# Patient Record
Sex: FEMALE | Race: WHITE | NOT HISPANIC OR LATINO | URBAN - METROPOLITAN AREA
[De-identification: names, ages, dates, MRNs, and addresses within clinical notes are randomized per-mention and may not be internally consistent; named-entity substitution may affect disease eponyms.]

---

## 2017-04-12 ENCOUNTER — INPATIENT (INPATIENT)
Facility: HOSPITAL | Age: 62
LOS: 5 days | Discharge: HOME | End: 2017-04-18
Attending: HOSPITALIST | Admitting: INTERNAL MEDICINE
Payer: MEDICARE

## 2017-04-12 VITALS — WEIGHT: 175.05 LBS | HEIGHT: 65 IN

## 2017-04-12 DIAGNOSIS — D72.829 ELEVATED WHITE BLOOD CELL COUNT, UNSPECIFIED: ICD-10-CM

## 2017-04-12 DIAGNOSIS — J18.9 PNEUMONIA, UNSPECIFIED ORGANISM: ICD-10-CM

## 2017-04-12 DIAGNOSIS — J96.91 RESPIRATORY FAILURE, UNSPECIFIED WITH HYPOXIA: ICD-10-CM

## 2017-04-12 DIAGNOSIS — M54.9 DORSALGIA, UNSPECIFIED: ICD-10-CM

## 2017-04-12 DIAGNOSIS — J18.1 LOBAR PNEUMONIA, UNSPECIFIED ORGANISM: ICD-10-CM

## 2017-04-12 LAB
ALBUMIN SERPL ELPH-MCNC: 3.1 G/DL — LOW (ref 3.3–5)
ALP SERPL-CCNC: 83 U/L — SIGNIFICANT CHANGE UP (ref 40–120)
ALT FLD-CCNC: 46 U/L — SIGNIFICANT CHANGE UP (ref 12–78)
ANION GAP SERPL CALC-SCNC: 12 MMOL/L — SIGNIFICANT CHANGE UP (ref 5–17)
APTT BLD: 32 SEC — SIGNIFICANT CHANGE UP (ref 27.5–37.4)
AST SERPL-CCNC: 57 U/L — HIGH (ref 15–37)
BASOPHILS # BLD AUTO: 0.2 K/UL — SIGNIFICANT CHANGE UP (ref 0–0.2)
BASOPHILS NFR BLD AUTO: 0.6 % — SIGNIFICANT CHANGE UP (ref 0–2)
BILIRUB SERPL-MCNC: 0.5 MG/DL — SIGNIFICANT CHANGE UP (ref 0.2–1.2)
BUN SERPL-MCNC: 10 MG/DL — SIGNIFICANT CHANGE UP (ref 7–23)
CALCIUM SERPL-MCNC: 9.5 MG/DL — SIGNIFICANT CHANGE UP (ref 8.5–10.1)
CHLORIDE SERPL-SCNC: 100 MMOL/L — SIGNIFICANT CHANGE UP (ref 96–108)
CK SERPL-CCNC: 205 U/L — HIGH (ref 26–192)
CO2 SERPL-SCNC: 24 MMOL/L — SIGNIFICANT CHANGE UP (ref 22–31)
CREAT SERPL-MCNC: 0.76 MG/DL — SIGNIFICANT CHANGE UP (ref 0.5–1.3)
EOSINOPHIL # BLD AUTO: 0.1 K/UL — SIGNIFICANT CHANGE UP (ref 0–0.5)
EOSINOPHIL NFR BLD AUTO: 0.3 % — SIGNIFICANT CHANGE UP (ref 0–6)
GLUCOSE SERPL-MCNC: 137 MG/DL — HIGH (ref 70–99)
HCT VFR BLD CALC: 38 % — SIGNIFICANT CHANGE UP (ref 34.5–45)
HGB BLD-MCNC: 13.3 G/DL — SIGNIFICANT CHANGE UP (ref 11.5–15.5)
INR BLD: 1.3 RATIO — HIGH (ref 0.88–1.16)
LYMPHOCYTES # BLD AUTO: 1.5 K/UL — SIGNIFICANT CHANGE UP (ref 1–3.3)
LYMPHOCYTES # BLD AUTO: 5.7 % — LOW (ref 13–44)
MACROCYTES BLD QL: SLIGHT — SIGNIFICANT CHANGE UP
MANUAL DIF COMMENT BLD-IMP: SIGNIFICANT CHANGE UP
MCHC RBC-ENTMCNC: 31.6 PG — SIGNIFICANT CHANGE UP (ref 27–34)
MCHC RBC-ENTMCNC: 35.1 GM/DL — SIGNIFICANT CHANGE UP (ref 32–36)
MCV RBC AUTO: 90.2 FL — SIGNIFICANT CHANGE UP (ref 80–100)
MONOCYTES # BLD AUTO: 1.3 K/UL — HIGH (ref 0–0.9)
MONOCYTES NFR BLD AUTO: 4.8 % — SIGNIFICANT CHANGE UP (ref 2–14)
NEUTROPHILS # BLD AUTO: 23.6 K/UL — HIGH (ref 1.8–7.4)
NEUTROPHILS NFR BLD AUTO: 88.6 % — HIGH (ref 43–77)
NT-PROBNP SERPL-SCNC: 558 PG/ML — HIGH (ref 0–125)
PLAT MORPH BLD: NORMAL — SIGNIFICANT CHANGE UP
PLATELET # BLD AUTO: 368 K/UL — SIGNIFICANT CHANGE UP (ref 150–400)
POTASSIUM SERPL-MCNC: 4.3 MMOL/L — SIGNIFICANT CHANGE UP (ref 3.5–5.3)
POTASSIUM SERPL-SCNC: 4.3 MMOL/L — SIGNIFICANT CHANGE UP (ref 3.5–5.3)
PROT SERPL-MCNC: 7.9 GM/DL — SIGNIFICANT CHANGE UP (ref 6–8.3)
PROTHROM AB SERPL-ACNC: 14.1 SEC — HIGH (ref 9.8–12.7)
RAPID RVP RESULT: SIGNIFICANT CHANGE UP
RBC # BLD: 4.21 M/UL — SIGNIFICANT CHANGE UP (ref 3.8–5.2)
RBC # FLD: 11.8 % — SIGNIFICANT CHANGE UP (ref 10.3–14.5)
RBC BLD AUTO: NORMAL — SIGNIFICANT CHANGE UP
SODIUM SERPL-SCNC: 136 MMOL/L — SIGNIFICANT CHANGE UP (ref 135–145)
TROPONIN I SERPL-MCNC: 0.02 NG/ML — SIGNIFICANT CHANGE UP (ref 0.01–0.04)
WBC # BLD: 26.6 K/UL — HIGH (ref 3.8–10.5)
WBC # FLD AUTO: 26.6 K/UL — HIGH (ref 3.8–10.5)

## 2017-04-12 PROCEDURE — 71250 CT THORAX DX C-: CPT | Mod: 26

## 2017-04-12 PROCEDURE — 99285 EMERGENCY DEPT VISIT HI MDM: CPT

## 2017-04-12 PROCEDURE — 93010 ELECTROCARDIOGRAM REPORT: CPT

## 2017-04-12 PROCEDURE — 74176 CT ABD & PELVIS W/O CONTRAST: CPT | Mod: 26

## 2017-04-12 RX ORDER — HYDROMORPHONE HYDROCHLORIDE 2 MG/ML
1 INJECTION INTRAMUSCULAR; INTRAVENOUS; SUBCUTANEOUS ONCE
Qty: 0 | Refills: 0 | Status: DISCONTINUED | OUTPATIENT
Start: 2017-04-12 | End: 2017-04-12

## 2017-04-12 RX ORDER — VANCOMYCIN HCL 1 G
1000 VIAL (EA) INTRAVENOUS ONCE
Qty: 0 | Refills: 0 | Status: COMPLETED | OUTPATIENT
Start: 2017-04-12 | End: 2017-04-12

## 2017-04-12 RX ORDER — IPRATROPIUM/ALBUTEROL SULFATE 18-103MCG
3 AEROSOL WITH ADAPTER (GRAM) INHALATION EVERY 6 HOURS
Qty: 0 | Refills: 0 | Status: DISCONTINUED | OUTPATIENT
Start: 2017-04-12 | End: 2017-04-18

## 2017-04-12 RX ORDER — HYDROMORPHONE HYDROCHLORIDE 2 MG/ML
1 INJECTION INTRAMUSCULAR; INTRAVENOUS; SUBCUTANEOUS EVERY 4 HOURS
Qty: 0 | Refills: 0 | Status: DISCONTINUED | OUTPATIENT
Start: 2017-04-12 | End: 2017-04-13

## 2017-04-12 RX ORDER — ONDANSETRON 8 MG/1
4 TABLET, FILM COATED ORAL EVERY 6 HOURS
Qty: 0 | Refills: 0 | Status: DISCONTINUED | OUTPATIENT
Start: 2017-04-12 | End: 2017-04-18

## 2017-04-12 RX ORDER — ACETAMINOPHEN 500 MG
650 TABLET ORAL EVERY 6 HOURS
Qty: 0 | Refills: 0 | Status: DISCONTINUED | OUTPATIENT
Start: 2017-04-12 | End: 2017-04-18

## 2017-04-12 RX ORDER — AZTREONAM 2 G
1000 VIAL (EA) INJECTION ONCE
Qty: 0 | Refills: 0 | Status: COMPLETED | OUTPATIENT
Start: 2017-04-12 | End: 2017-04-12

## 2017-04-12 RX ORDER — VANCOMYCIN HCL 1 G
VIAL (EA) INTRAVENOUS
Qty: 0 | Refills: 0 | Status: DISCONTINUED | OUTPATIENT
Start: 2017-04-12 | End: 2017-04-13

## 2017-04-12 RX ORDER — SODIUM CHLORIDE 9 MG/ML
750 INJECTION INTRAMUSCULAR; INTRAVENOUS; SUBCUTANEOUS ONCE
Qty: 0 | Refills: 0 | Status: COMPLETED | OUTPATIENT
Start: 2017-04-12 | End: 2017-04-12

## 2017-04-12 RX ORDER — AZTREONAM 2 G
1000 VIAL (EA) INJECTION EVERY 12 HOURS
Qty: 0 | Refills: 0 | Status: DISCONTINUED | OUTPATIENT
Start: 2017-04-12 | End: 2017-04-13

## 2017-04-12 RX ORDER — ONDANSETRON 8 MG/1
4 TABLET, FILM COATED ORAL ONCE
Qty: 0 | Refills: 0 | Status: COMPLETED | OUTPATIENT
Start: 2017-04-12 | End: 2017-04-12

## 2017-04-12 RX ORDER — ZOLPIDEM TARTRATE 10 MG/1
5 TABLET ORAL AT BEDTIME
Qty: 0 | Refills: 0 | Status: DISCONTINUED | OUTPATIENT
Start: 2017-04-12 | End: 2017-04-18

## 2017-04-12 RX ORDER — VANCOMYCIN HCL 1 G
1000 VIAL (EA) INTRAVENOUS EVERY 24 HOURS
Qty: 0 | Refills: 0 | Status: DISCONTINUED | OUTPATIENT
Start: 2017-04-13 | End: 2017-04-13

## 2017-04-12 RX ORDER — AZTREONAM 2 G
1000 VIAL (EA) INJECTION EVERY 12 HOURS
Qty: 0 | Refills: 0 | Status: DISCONTINUED | OUTPATIENT
Start: 2017-04-13 | End: 2017-04-13

## 2017-04-12 RX ORDER — ALBUTEROL 90 UG/1
2.5 AEROSOL, METERED ORAL EVERY 4 HOURS
Qty: 0 | Refills: 0 | Status: DISCONTINUED | OUTPATIENT
Start: 2017-04-12 | End: 2017-04-18

## 2017-04-12 RX ORDER — AZTREONAM 2 G
VIAL (EA) INJECTION
Qty: 0 | Refills: 0 | Status: DISCONTINUED | OUTPATIENT
Start: 2017-04-12 | End: 2017-04-13

## 2017-04-12 RX ADMIN — HYDROMORPHONE HYDROCHLORIDE 1 MILLIGRAM(S): 2 INJECTION INTRAMUSCULAR; INTRAVENOUS; SUBCUTANEOUS at 21:30

## 2017-04-12 RX ADMIN — HYDROMORPHONE HYDROCHLORIDE 1 MILLIGRAM(S): 2 INJECTION INTRAMUSCULAR; INTRAVENOUS; SUBCUTANEOUS at 17:02

## 2017-04-12 RX ADMIN — Medication 30 MILLILITER(S): at 23:37

## 2017-04-12 RX ADMIN — Medication 250 MILLIGRAM(S): at 19:00

## 2017-04-12 RX ADMIN — HYDROMORPHONE HYDROCHLORIDE 1 MILLIGRAM(S): 2 INJECTION INTRAMUSCULAR; INTRAVENOUS; SUBCUTANEOUS at 16:20

## 2017-04-12 RX ADMIN — ONDANSETRON 4 MILLIGRAM(S): 8 TABLET, FILM COATED ORAL at 16:20

## 2017-04-12 RX ADMIN — SODIUM CHLORIDE 1500 MILLILITER(S): 9 INJECTION INTRAMUSCULAR; INTRAVENOUS; SUBCUTANEOUS at 15:25

## 2017-04-12 RX ADMIN — HYDROMORPHONE HYDROCHLORIDE 1 MILLIGRAM(S): 2 INJECTION INTRAMUSCULAR; INTRAVENOUS; SUBCUTANEOUS at 17:32

## 2017-04-12 RX ADMIN — HYDROMORPHONE HYDROCHLORIDE 1 MILLIGRAM(S): 2 INJECTION INTRAMUSCULAR; INTRAVENOUS; SUBCUTANEOUS at 16:50

## 2017-04-12 RX ADMIN — Medication 50 MILLIGRAM(S): at 16:48

## 2017-04-12 NOTE — ED PROVIDER NOTE - NS ED MD SCRIBE ATTENDING SCRIBE SECTIONS
PROGRESS NOTE/RESULTS/HISTORY OF PRESENT ILLNESS/PAST MEDICAL/SURGICAL/SOCIAL HISTORY/REVIEW OF SYSTEMS/PHYSICAL EXAM/DISPOSITION

## 2017-04-12 NOTE — H&P ADULT - ASSESSMENT
Pt is a 61 year female with a PMHx notable for kidney stone right side who presents to the ED with complaints of Rt sided flank pain, fever for the past few days.  Pt states that she was advised by Dr. Cool to come into the ED for further evaluation and treatment due to symptoms and low O2 saturation.  Pt state that she is experiencing sharp pain to her right side, back pain, cough, mylagias that worsened this AM.    ED course:  O2 saturation 88%.  CT abd/pelvis: Large consolidation with near complete collapse of the right middle and lower lobes.  There is partial atelectasis of the right upper lobe.  There is a small to moderate Rt pl effusion extending along the lateral Rt hemithorax.  Pt was given vancomcyin, aztreonam, and NS IV fluid bolus. Pt is a 61 year female with a PMHx notable for kidney stone, and chronic back pain.  She presents to the ED with complaints of Rt sided flank pain, fever for the past few days.  Pt states that she was advised by Dr. Cool to come into the ED for further evaluation and treatment due to symptoms and low O2 saturation.  Pt state that she is experiencing sharp pain to her right side, back pain, cough, mylagias that worsened this AM.    ED course:  O2 saturation 88%.  CT abd/pelvis: Large consolidation with near complete collapse of the right middle and lower lobes.  There is partial atelectasis of the right upper lobe.  There is a small to moderate Rt pl effusion extending along the lateral Rt hemithorax.  Pt was given vancomcyin, aztreonam, and NS IV fluid bolus.

## 2017-04-12 NOTE — H&P ADULT - PROBLEM SELECTOR PLAN 3
Likely related to lobar pneumonia.  Closely monitor and if clincially no improvement might need to tap

## 2017-04-12 NOTE — H&P ADULT - NSHPPHYSICALEXAM_GEN_ALL_CORE
Physical Exam:   GENERAL APPEARANCE:  NAD, hemodynamically stable  T(C): 36.6, Max: 38.3 (04-12 @ 16:23)  HR: 91 (91 - 114)  BP: 106/64 (95/74 - 153/82)  RR: 19 (18 - 19)  SpO2: 94% (89% - 98%)

## 2017-04-12 NOTE — H&P ADULT - HISTORY OF PRESENT ILLNESS
Pt is a 61 year female with a PMHx notable for kidney stone right side who presents to the ED with complaints of Rt sided flank pain, fever for the past few days.  Pt states that she was advised by Dr. Cool to come into the ED for further evaluation and treatment due to symptoms and low O2 saturation.  Pt state that she is experiencing sharp pain to her right side, back pain, cough, mylagias that worsened this AM.    ED course:  O2 saturation 88%.  CT abd/pelvis: Large consolidation with near complete collapse of the right middle and lower lobes.  There is partial atelectasis of the right upper lobe.  There is a small to moderate Rt pl effusion extending along the lateral Rt hemithorax.  Pt was given vancomcyin, aztreonam, and NS IV fluid bolus. Pt is a 61 year female with a PMHx notable for kidney stone right side, and chronic pain.  She presents to the ED with complaints of Rt sided flank pain, fever for the past few days.  Pt states that she was advised by Dr. Cool to come into the ED for further evaluation and treatment due to symptoms and low O2 saturation.  Pt state that she is experiencing sharp pain to her right side, back pain, cough, mylagias that worsened this AM.    ED course:  O2 saturation 88%.  CT abd/pelvis: Large consolidation with near complete collapse of the right middle and lower lobes.  There is partial atelectasis of the right upper lobe.  There is a small to moderate Rt pl effusion extending along the lateral Rt hemithorax.  Pt was given vancomcyin, aztreonam, and NS IV fluid bolus.

## 2017-04-12 NOTE — ED PROVIDER NOTE - OBJECTIVE STATEMENT
60 y/o F with a PMHx of kidney stone right side presents to the ED c/o right sided flank pain, fever for the past few days. Pt states that she was advised by Travon to come into the ED for further eval and tx due to symptoms and low O2 sat. Pt states that she is experiencing sharp pain to her right side, back pain, cough, myalgias that worsened this am.

## 2017-04-12 NOTE — H&P ADULT - PROBLEM SELECTOR PLAN 2
Blood culture's x 2 already collected in ED  Send sputum gram stain/culture  Check urine legionella antigen  s/p IV vancomcyin / aztreonam in ED  Continuue with aztreonam and consult ID

## 2017-04-12 NOTE — PATIENT PROFILE ADULT. - PSH
No significant past surgical history Bilateral carpal tunnel syndrome    History of colon resection  Colon Resection - 2005  History of left knee surgery  Left Knee surgery -2013

## 2017-04-12 NOTE — ED PROVIDER NOTE - MEDICAL DECISION MAKING DETAILS
Pt currently calm, presenting mild discomfort with plans to receive imaging, IV fluids, labs for further eval and tx.

## 2017-04-12 NOTE — ED STATDOCS - PROGRESS NOTE DETAILS
Cynthia Loaiza: 62 y/o F presents to the ED c/o right mid back pain associated with SOB. THe pt currently has a low o2 sat, will be further evaluated in Main ED. I, Sravani Reilly, performed the initial face to face bedside interview with this patient regarding history of present illness and determined that the patient should be seen in the main ED.  The history, was documented by the scribe in my presence and I attest to the accuracy of the documentation.

## 2017-04-12 NOTE — ED ADULT NURSE REASSESSMENT NOTE - NS ED NURSE REASSESS COMMENT FT1
Pt repositioned for comfort. Pt afebrile at this time but states " I'm hot." Pt given ice packs for relief and started on second IV antibiotic. Will continue to monitor for safety and comfort.

## 2017-04-12 NOTE — ED ADULT NURSE REASSESSMENT NOTE - NS ED NURSE REASSESS COMMENT FT1
Pt medicated with 1 mg of Dilaudid as verbally ordered by Dr. Leo. Will continue to monitor for safety and comfort.

## 2017-04-12 NOTE — ED PROVIDER NOTE - CONSTITUTIONAL, MLM
normal... Ill appearing, elderly female, awake, alert, oriented to person, place, time/situation presenting mild discomfort.

## 2017-04-12 NOTE — H&P ADULT - PROBLEM SELECTOR PLAN 5
Pt normally on multiple pain meds at home; but request's that we not order them in the hospital; because her back injury is work related and she is anxious that PCP will not prescribe meds;  if they are given in hospital acute care setting.  Is instead requesting IV dilaudid.

## 2017-04-12 NOTE — H&P ADULT - NSHPLABSRESULTS_GEN_ALL_CORE
13.3   26.6  )-----------( 368      ( 12 Apr 2017 15:35 )             38.0     04-12    136  |  100  |  10  ----------------------------<  137<H>  4.3   |  24  |  0.76    Ca    9.5      12 Apr 2017 15:35    T Protein  7.9  /  Albumin  3.1<L>  /  Total Bilirubin  0.5  /  Direct Bili  x   /  AST  57<H>  /  ALT  46  /  AlkPhos  83  04-12    CARDIAC MARKERS ( 12 Apr 2017 15:35 )  0.019 ng/mL / x     / 205 U/L / x     / x              PT/INR - ( 12 Apr 2017 15:35 )   PT: 14.1 sec;   INR: 1.30 ratio         PTT - ( 12 Apr 2017 15:35 )  PTT:32.0 sec

## 2017-04-12 NOTE — ED STATDOCS - DETAILS:
I, Sravani Reilly, performed the initial face to face bedside interview with this patient regarding history of present illness and determined that the patient should be seen in the main ED.  The history, was documented by the scribe in my presence and I attest to the accuracy of the documentation.

## 2017-04-12 NOTE — ED ADULT NURSE NOTE - OBJECTIVE STATEMENT
60 y/o c/o right side flank pain x 2 days, worse today. Denies blood in urine, denies urgency and frequency.

## 2017-04-13 DIAGNOSIS — G56.03 CARPAL TUNNEL SYNDROME, BILATERAL UPPER LIMBS: Chronic | ICD-10-CM

## 2017-04-13 DIAGNOSIS — Z90.49 ACQUIRED ABSENCE OF OTHER SPECIFIED PARTS OF DIGESTIVE TRACT: Chronic | ICD-10-CM

## 2017-04-13 DIAGNOSIS — Z98.890 OTHER SPECIFIED POSTPROCEDURAL STATES: Chronic | ICD-10-CM

## 2017-04-13 LAB
ANION GAP SERPL CALC-SCNC: 14 MMOL/L — SIGNIFICANT CHANGE UP (ref 5–17)
BASOPHILS # BLD AUTO: 0.1 K/UL — SIGNIFICANT CHANGE UP (ref 0–0.2)
BASOPHILS NFR BLD AUTO: 0.5 % — SIGNIFICANT CHANGE UP (ref 0–2)
BUN SERPL-MCNC: 6 MG/DL — LOW (ref 7–23)
CALCIUM SERPL-MCNC: 9.1 MG/DL — SIGNIFICANT CHANGE UP (ref 8.5–10.1)
CHLORIDE SERPL-SCNC: 102 MMOL/L — SIGNIFICANT CHANGE UP (ref 96–108)
CO2 SERPL-SCNC: 23 MMOL/L — SIGNIFICANT CHANGE UP (ref 22–31)
CREAT SERPL-MCNC: 0.56 MG/DL — SIGNIFICANT CHANGE UP (ref 0.5–1.3)
EOSINOPHIL # BLD AUTO: 0.1 K/UL — SIGNIFICANT CHANGE UP (ref 0–0.5)
EOSINOPHIL NFR BLD AUTO: 0.3 % — SIGNIFICANT CHANGE UP (ref 0–6)
GLUCOSE SERPL-MCNC: 129 MG/DL — HIGH (ref 70–99)
HCT VFR BLD CALC: 37.1 % — SIGNIFICANT CHANGE UP (ref 34.5–45)
HGB BLD-MCNC: 12.6 G/DL — SIGNIFICANT CHANGE UP (ref 11.5–15.5)
LEGIONELLA AG UR QL: NEGATIVE — SIGNIFICANT CHANGE UP
LYMPHOCYTES # BLD AUTO: 1.6 K/UL — SIGNIFICANT CHANGE UP (ref 1–3.3)
LYMPHOCYTES # BLD AUTO: 6.6 % — LOW (ref 13–44)
MCHC RBC-ENTMCNC: 30.8 PG — SIGNIFICANT CHANGE UP (ref 27–34)
MCHC RBC-ENTMCNC: 33.8 GM/DL — SIGNIFICANT CHANGE UP (ref 32–36)
MCV RBC AUTO: 91.1 FL — SIGNIFICANT CHANGE UP (ref 80–100)
MONOCYTES # BLD AUTO: 1.1 K/UL — HIGH (ref 0–0.9)
MONOCYTES NFR BLD AUTO: 4.5 % — SIGNIFICANT CHANGE UP (ref 2–14)
NEUTROPHILS # BLD AUTO: 21 K/UL — HIGH (ref 1.8–7.4)
NEUTROPHILS NFR BLD AUTO: 88.1 % — HIGH (ref 43–77)
PLATELET # BLD AUTO: 342 K/UL — SIGNIFICANT CHANGE UP (ref 150–400)
POTASSIUM SERPL-MCNC: 3.7 MMOL/L — SIGNIFICANT CHANGE UP (ref 3.5–5.3)
POTASSIUM SERPL-SCNC: 3.7 MMOL/L — SIGNIFICANT CHANGE UP (ref 3.5–5.3)
RBC # BLD: 4.08 M/UL — SIGNIFICANT CHANGE UP (ref 3.8–5.2)
RBC # FLD: 11.7 % — SIGNIFICANT CHANGE UP (ref 10.3–14.5)
SODIUM SERPL-SCNC: 139 MMOL/L — SIGNIFICANT CHANGE UP (ref 135–145)
WBC # BLD: 23.9 K/UL — HIGH (ref 3.8–10.5)
WBC # FLD AUTO: 23.9 K/UL — HIGH (ref 3.8–10.5)

## 2017-04-13 RX ORDER — CEFTRIAXONE 500 MG/1
INJECTION, POWDER, FOR SOLUTION INTRAMUSCULAR; INTRAVENOUS
Qty: 0 | Refills: 0 | Status: DISCONTINUED | OUTPATIENT
Start: 2017-04-13 | End: 2017-04-18

## 2017-04-13 RX ORDER — ATENOLOL 25 MG/1
25 TABLET ORAL DAILY
Qty: 0 | Refills: 0 | Status: DISCONTINUED | OUTPATIENT
Start: 2017-04-13 | End: 2017-04-18

## 2017-04-13 RX ORDER — CEFTRIAXONE 500 MG/1
1 INJECTION, POWDER, FOR SOLUTION INTRAMUSCULAR; INTRAVENOUS ONCE
Qty: 0 | Refills: 0 | Status: COMPLETED | OUTPATIENT
Start: 2017-04-13 | End: 2017-04-13

## 2017-04-13 RX ORDER — GABAPENTIN 400 MG/1
300 CAPSULE ORAL AT BEDTIME
Qty: 0 | Refills: 0 | Status: DISCONTINUED | OUTPATIENT
Start: 2017-04-13 | End: 2017-04-18

## 2017-04-13 RX ORDER — ASPIRIN/CALCIUM CARB/MAGNESIUM 324 MG
81 TABLET ORAL DAILY
Qty: 0 | Refills: 0 | Status: DISCONTINUED | OUTPATIENT
Start: 2017-04-13 | End: 2017-04-13

## 2017-04-13 RX ORDER — CEFEPIME 1 G/1
1000 INJECTION, POWDER, FOR SOLUTION INTRAMUSCULAR; INTRAVENOUS EVERY 12 HOURS
Qty: 0 | Refills: 0 | Status: DISCONTINUED | OUTPATIENT
Start: 2017-04-13 | End: 2017-04-13

## 2017-04-13 RX ORDER — OXYCODONE HYDROCHLORIDE 5 MG/1
10 TABLET ORAL EVERY 12 HOURS
Qty: 0 | Refills: 0 | Status: DISCONTINUED | OUTPATIENT
Start: 2017-04-13 | End: 2017-04-13

## 2017-04-13 RX ORDER — MELOXICAM 15 MG/1
1 TABLET ORAL
Qty: 0 | Refills: 0 | COMMUNITY

## 2017-04-13 RX ORDER — LIDOCAINE 4 G/100G
1 CREAM TOPICAL DAILY
Qty: 0 | Refills: 0 | Status: DISCONTINUED | OUTPATIENT
Start: 2017-04-13 | End: 2017-04-18

## 2017-04-13 RX ORDER — OXYCODONE HYDROCHLORIDE 5 MG/1
1 TABLET ORAL
Qty: 0 | Refills: 0 | COMMUNITY

## 2017-04-13 RX ORDER — MORPHINE SULFATE 50 MG/1
30 CAPSULE, EXTENDED RELEASE ORAL EVERY 12 HOURS
Qty: 0 | Refills: 0 | Status: DISCONTINUED | OUTPATIENT
Start: 2017-04-13 | End: 2017-04-18

## 2017-04-13 RX ORDER — VANCOMYCIN HCL 1 G
750 VIAL (EA) INTRAVENOUS EVERY 12 HOURS
Qty: 0 | Refills: 0 | Status: DISCONTINUED | OUTPATIENT
Start: 2017-04-13 | End: 2017-04-18

## 2017-04-13 RX ORDER — ASPIRIN/CALCIUM CARB/MAGNESIUM 324 MG
81 TABLET ORAL DAILY
Qty: 0 | Refills: 0 | Status: DISCONTINUED | OUTPATIENT
Start: 2017-04-13 | End: 2017-04-18

## 2017-04-13 RX ORDER — OXYCODONE HYDROCHLORIDE 5 MG/1
30 TABLET ORAL EVERY 4 HOURS
Qty: 0 | Refills: 0 | Status: DISCONTINUED | OUTPATIENT
Start: 2017-04-13 | End: 2017-04-18

## 2017-04-13 RX ORDER — CEFTRIAXONE 500 MG/1
1 INJECTION, POWDER, FOR SOLUTION INTRAMUSCULAR; INTRAVENOUS EVERY 24 HOURS
Qty: 0 | Refills: 0 | Status: DISCONTINUED | OUTPATIENT
Start: 2017-04-14 | End: 2017-04-18

## 2017-04-13 RX ORDER — ENOXAPARIN SODIUM 100 MG/ML
40 INJECTION SUBCUTANEOUS EVERY 24 HOURS
Qty: 0 | Refills: 0 | Status: DISCONTINUED | OUTPATIENT
Start: 2017-04-13 | End: 2017-04-18

## 2017-04-13 RX ADMIN — LIDOCAINE 1 PATCH: 4 CREAM TOPICAL at 01:34

## 2017-04-13 RX ADMIN — Medication 50 MILLIGRAM(S): at 21:45

## 2017-04-13 RX ADMIN — OXYCODONE HYDROCHLORIDE 30 MILLIGRAM(S): 5 TABLET ORAL at 21:46

## 2017-04-13 RX ADMIN — OXYCODONE HYDROCHLORIDE 30 MILLIGRAM(S): 5 TABLET ORAL at 17:16

## 2017-04-13 RX ADMIN — Medication 50 MILLIGRAM(S): at 05:13

## 2017-04-13 RX ADMIN — GABAPENTIN 300 MILLIGRAM(S): 400 CAPSULE ORAL at 21:45

## 2017-04-13 RX ADMIN — MORPHINE SULFATE 30 MILLIGRAM(S): 50 CAPSULE, EXTENDED RELEASE ORAL at 13:32

## 2017-04-13 RX ADMIN — HYDROMORPHONE HYDROCHLORIDE 1 MILLIGRAM(S): 2 INJECTION INTRAMUSCULAR; INTRAVENOUS; SUBCUTANEOUS at 10:12

## 2017-04-13 RX ADMIN — Medication 81 MILLIGRAM(S): at 13:20

## 2017-04-13 RX ADMIN — HYDROMORPHONE HYDROCHLORIDE 1 MILLIGRAM(S): 2 INJECTION INTRAMUSCULAR; INTRAVENOUS; SUBCUTANEOUS at 01:42

## 2017-04-13 RX ADMIN — MORPHINE SULFATE 30 MILLIGRAM(S): 50 CAPSULE, EXTENDED RELEASE ORAL at 12:55

## 2017-04-13 RX ADMIN — Medication 3 MILLILITER(S): at 10:03

## 2017-04-13 RX ADMIN — Medication 150 MILLIGRAM(S): at 18:52

## 2017-04-13 RX ADMIN — LIDOCAINE 1 PATCH: 4 CREAM TOPICAL at 13:32

## 2017-04-13 RX ADMIN — HYDROMORPHONE HYDROCHLORIDE 1 MILLIGRAM(S): 2 INJECTION INTRAMUSCULAR; INTRAVENOUS; SUBCUTANEOUS at 05:13

## 2017-04-13 RX ADMIN — HYDROMORPHONE HYDROCHLORIDE 1 MILLIGRAM(S): 2 INJECTION INTRAMUSCULAR; INTRAVENOUS; SUBCUTANEOUS at 05:28

## 2017-04-13 RX ADMIN — Medication 3 MILLILITER(S): at 01:55

## 2017-04-13 RX ADMIN — Medication 3 MILLILITER(S): at 19:49

## 2017-04-13 RX ADMIN — ENOXAPARIN SODIUM 40 MILLIGRAM(S): 100 INJECTION SUBCUTANEOUS at 09:43

## 2017-04-13 RX ADMIN — HYDROMORPHONE HYDROCHLORIDE 1 MILLIGRAM(S): 2 INJECTION INTRAMUSCULAR; INTRAVENOUS; SUBCUTANEOUS at 01:47

## 2017-04-13 RX ADMIN — CEFTRIAXONE 100 GRAM(S): 500 INJECTION, POWDER, FOR SOLUTION INTRAMUSCULAR; INTRAVENOUS at 15:43

## 2017-04-13 RX ADMIN — Medication 50 MILLIGRAM(S): at 17:30

## 2017-04-13 RX ADMIN — OXYCODONE HYDROCHLORIDE 30 MILLIGRAM(S): 5 TABLET ORAL at 17:35

## 2017-04-13 RX ADMIN — Medication 650 MILLIGRAM(S): at 04:56

## 2017-04-13 RX ADMIN — Medication 650 MILLIGRAM(S): at 05:26

## 2017-04-13 RX ADMIN — HYDROMORPHONE HYDROCHLORIDE 1 MILLIGRAM(S): 2 INJECTION INTRAMUSCULAR; INTRAVENOUS; SUBCUTANEOUS at 09:42

## 2017-04-13 RX ADMIN — Medication 3 MILLILITER(S): at 14:26

## 2017-04-13 RX ADMIN — Medication 50 MILLIGRAM(S): at 12:55

## 2017-04-13 RX ADMIN — ATENOLOL 25 MILLIGRAM(S): 25 TABLET ORAL at 13:20

## 2017-04-13 NOTE — PROGRESS NOTE ADULT - ATTENDING COMMENTS
Patient seen and examined with KEVIN Green.  Agree with physical exam and assessment and plan.  - acute hypoxic resp failure secondary to CAP  - continue meds for chronic pain

## 2017-04-13 NOTE — CONSULT NOTE ADULT - ASSESSMENT
Pt is a 61 year female with a PMHx notable for kidney stone right side, and chronic pain admitted 4/12 with c/o of R flank pain, fevers, cough myalgias, was sent in by urologist for further eval, here febrile to 100.9, hypoxic on RA to 88%,  wbc ct 23.9, CT chest with large consolidation with near complete collapse of the right middle and lower lobes, RUL atelectasis, moderate R pleural effusion, pt was given IV vancomycin/aztreonam d/t concern of infection.    1. sepsis/hypoxic resp failure/multilobar PNA/R parapneumonic effusion/PCN allergy    - reports vomiting with augmentin but no rash or anaphylaxis    - no recent hospitalizations in past 3 months    - d/c aztreonam and start IV rocephin [id number] and continue with IV vancomcyin [id number], check trough prior to 4th dose    - check sputum cultures    - f/u legionella ag    - f/u cultures    - check UA    - supportive care    - monitor temps    -tolerating abx well so far; no side effects noted    -reason for abx use and side effects reviewed with patient; monitor BMP and vancomycin trough levels    2. other issues - kidney stone right side, and chronic pain - care per medicine

## 2017-04-13 NOTE — CONSULT NOTE ADULT - SUBJECTIVE AND OBJECTIVE BOX
Patient is a 61y old  Female who presents with a chief complaint of Right flank pain (12 Apr 2017 20:24)      HPI:  Pt is a 61 year female with a PMHx notable for kidney stone right side, and chronic pain admitted 4/12 with c/o of R flank pain, fevers, cough myalgias, was sent in by urologist for further eval, here afebrile, hypoxic on RA to 88%,  wbc ct 23.9, CT chest with large consolidation with near complete collapse of the right middle and lower lobes, RUL atelectasis, moderate R pleural effusion, pt was given IV vancomycin/aztreonam d/t concern of infection.         PMH: as above    PSH: as above    Meds: per reconciliation sheet, noted below    MEDICATIONS  (STANDING):  aztreonam  IVPB  IV Intermittent   aztreonam  IVPB 1000milliGRAM(s) IV Intermittent every 12 hours  vancomycin  IVPB 1000milliGRAM(s) IV Intermittent every 24 hours  vancomycin  IVPB  IV Intermittent   aztreonam  IVPB 1000milliGRAM(s) IV Intermittent every 12 hours  ALBUTerol/ipratropium for Nebulization 3milliLiter(s) Nebulizer every 6 hours  lidocaine   Patch 1Patch Transdermal daily  enoxaparin Injectable 40milliGRAM(s) SubCutaneous every 24 hours  morphine ER Tablet 30milliGRAM(s) Oral every 12 hours  gabapentin 300milliGRAM(s) Oral at bedtime  pregabalin 50milliGRAM(s) Oral three times a day  ATENolol  Tablet 25milliGRAM(s) Oral daily  aspirin  chewable 81milliGRAM(s) Oral daily      Allergies    Augmentin (Other)    Intolerances        Social: no smoking, no alcohol, no illegal drugs; no recent travel, no exposure to TB    Family history:  No pertinent family history in first degree relatives      ROS: the patient denies fever, no chills, no HA, no dizziness, no sore throat, no blurry vision, no CP, no palpitations, N/V, no dysuria, no leg pain, no claudication, no rash, no joint aches, no rectal pain or bleeding, no night sweats    Vital Signs Last 24 Hrs  T(C): 37.3, Max: 38.3 (04-12 @ 16:23)  T(F): 99.1, Max: 100.9 (04-12 @ 16:23)  HR: 110 (78 - 123)  BP: 150/76 (95/74 - 155/85)  BP(mean): --  RR: 20 (15 - 20)  SpO2: 95% (89% - 98%)      PE:  Constitutional: NAD, laying in bed  HEENT: NC/AT, EOMI, PERRLA  Neck: supple  Back: no tenderness  Respiratory: decreased breath sounds, scattered rhonchi  Cardiovascular: S1S2 regular, no murmurs  Abdomen: soft, + tender, not distended, positive BS  Genitourinary: deferred  Rectal: deferred  Musculoskeletal: no muscle tenderness, no joint swelling or tenderness  Extremities: no pedal edema  Neurological:  moving all extremities, no focal deficits  Skin: no rashes    Labs:                        12.6   23.9  )-----------( 342      ( 13 Apr 2017 05:34 )             37.1     04-13    139  |  102  |  6<L>  ----------------------------<  129<H>  3.7   |  23  |  0.56    Ca    9.1      13 Apr 2017 05:34    TPro  7.9  /  Alb  3.1<L>  /  TBili  0.5  /  DBili  x   /  AST  57<H>  /  ALT  46  /  AlkPhos  83  04-12     LIVER FUNCTIONS - ( 12 Apr 2017 15:35 )  Alb: 3.1 g/dL / Pro: 7.9 gm/dL / ALK PHOS: 83 U/L / ALT: 46 U/L / AST: 57 U/L / GGT: x                   Radiology:    Advanced directives addressed: full resuscitation

## 2017-04-13 NOTE — PROGRESS NOTE ADULT - SUBJECTIVE AND OBJECTIVE BOX
Pt is a 61 year female with a PMHx notable for kidney stone right side, and chronic pain.  She presents to the ED with complaints of Rt sided flank pain, fever for the past few days.  Pt states that she was advised by Dr. Cool to come into the ED for further evaluation and treatment due to symptoms and low O2 saturation.  Pt state that she is experiencing sharp pain to her right side, back pain, cough, myalgias that has worsened.    ED course:  O2 saturation 88%.  CT abd/pelvis: Large consolidation with near complete collapse of the right middle and lower lobes.  There is partial atelectasis of the right upper lobe.  There is a small to moderate Rt pl effusion extending along the lateral Rt hemithorax.  Patient was started on IV ABT.     4/13- patient seen and examined. still complaining of back pain.    ROS:   All 10 systems reviewed and found to be negative with the exception of what has been described above.    Vital Signs Last 24 Hrs  T(C): 37.3, Max: 38.3 (04-12 @ 16:23)  T(F): 99.1, Max: 100.9 (04-12 @ 16:23)  HR: 110 (78 - 123)  BP: 150/76 (95/74 - 155/85)  BP(mean): --  RR: 20 (15 - 20)  SpO2: 95% (89% - 98%)        PHYSICAL EXAM:    General:Alert, well-developed  Neuro:Alert and oriented to person, place, and time. Able to communicate  well.  Sensation intact in all extremities. Appropriate affect.   HEENT: No JVD, no masses, Eyes anicteric, No carotid bruits. No lymphadenopathy,  Cardiovascular: regular , with normal S1 and S2. No murmurs, rubs,  or gallops. No JVD.   Lungs: clear.decreased breath sounds on the right- no wheezing.  Abdomen: Normoactive bowel sounds. Soft, flat, non-tender, and non-distended.  No hepatosplenomegaly, positive bowel sounds  no s/p tenderness  Skin: Warm, dry, well-perfused. No rashes or other lesions.    MEDICATIONS  (STANDING):  aztreonam  IVPB  IV Intermittent   aztreonam  IVPB 1000milliGRAM(s) IV Intermittent every 12 hours  vancomycin  IVPB 1000milliGRAM(s) IV Intermittent every 24 hours  vancomycin  IVPB  IV Intermittent   aztreonam  IVPB 1000milliGRAM(s) IV Intermittent every 12 hours  ALBUTerol/ipratropium for Nebulization 3milliLiter(s) Nebulizer every 6 hours  lidocaine   Patch 1Patch Transdermal daily  enoxaparin Injectable 40milliGRAM(s) SubCutaneous every 24 hours  morphine ER Tablet 30milliGRAM(s) Oral every 12 hours  gabapentin 300milliGRAM(s) Oral daily  pregabalin 50milliGRAM(s) Oral three times a day    MEDICATIONS  (PRN):  acetaminophen   Tablet 650milliGRAM(s) Oral every 6 hours PRN For Temp greater than 38.5 C (101.3 F)  ondansetron Injectable 4milliGRAM(s) IV Push every 6 hours PRN Nausea  zolpidem 5milliGRAM(s) Oral at bedtime PRN Insomnia  ALBUTerol    0.083% 2.5milliGRAM(s) Nebulizer every 4 hours PRN Shortness of Breath and/or Wheezing  acetaminophen   Tablet. 650milliGRAM(s) Oral every 6 hours PRN Mild Pain (1 - 3)  oxyCODONE IR 30milliGRAM(s) Oral every 6 hours PRN Moderate Pain (4 - 6)      EXAM:  CT ABDOMEN AND PELVIS                        PROCEDURE DATE:  04/12/2017    INTERPRETATION:  CT OF THE CHEST, ABDOMEN AND PELVIS WITHOUT IV CONTRAST   CLINICAL INFORMATION:  hypoxia and right flank pain  TECHNIQUE: CT scan of thechest, abdomen, and pelvis was performed from   the thoracic inlet through the symphysis pubis without intravenous or   oral contrast.  Sagittal and coronal reformatted images provided.  COMPARISON: None.  FINDINGS:  Evaluation of the solid organs and vasculature limited in the absence of   intravenous contrast.  CHEST:  LUNGS, AIRWAYS, PLEURA: There are large consolidations with near complete   collapse of the right middle and lower lobes. There is partial   atelectasis of the right upper lobe. There is a small to moderate right   pleural effusion extending along the lateral right hemithorax. There is   eventration of the right hemidiaphragm and liver into the mid right   hemithorax. There are a few very small foci of groundglass opacity in the   left upper lobe, may reflect additional foci of infection. There is   fibrotic change at the left lung base.  VESSELS: Atherosclerotic changes..  HEART: Normal size. No pericardial effusion.  MEDIASTINUM AND ILEANA: Within normal limits.  CHEST WALL, AXILLA, LOWER NECK: Within normal limits.  ABDOMEN/PELVIS:  LIVER: No focal hepatic lesion. There is fatty infiltration of the liver.   There is eventration of the right hemidiaphragm and liver into the mid   right hemithorax.  GALLBLADDER: Unremarkable.  BILIARY TREE: Unremarkable.  PANCREAS: Unremarkable.  SPLEEN: Unremarkable.  ADRENALS: Unremarkable.  KIDNEYS/URETERS: Nonobstructive 5 x 1 mm calculus in the lower pole of   the right kidney. Small simple cyst in the lower poleof the right   kidney. No left renal calculi. No hydronephrosis bilaterally..  BOWEL: Partial sigmoid resection and anastomosis is present.  No bowel   obstruction or inflammatory process.    PERITONEUM/RETROPERITONEUM:  No ascites, free air or significant   lymphadenopathy.  BLADDER: Unremarkable.  REPRODUCTIVE ORGANS: Supracervical hysterectomy is suspected..  VASCULATURE: Within normal limits.  ABDOMINAL WALL:  Unremarkable  OSSEOUS STRUCTURES:  Multilevel degenerative changes of the spine are identified. The osseous   structures are intact without evidence of fracture or dislocation.  IMPRESSION:   Large consolidations with near complete collapse of the right middle and   lower lobes. There is partial atelectasis of the right upper lobe. There   is a small to moderate right pleural effusion extending along the lateral   right hemithorax. There is eventration of the right hemidiaphragm and   liver into the mid right hemithorax. Few very small foci of groundglass   opacityin the left upper lobe, may reflect additional foci of infection.   Pt is a 61 year female with a PMHx notable for kidney stone right side who presents to the ED with complaints of Rt sided flank pain, fever for the past few days.  Pt states that she was advised by Dr. Cool to come into the ED for further evaluation and treatment due to symptoms and low O2 saturation.  Pt state that she is experiencing sharp pain to her right side, back pain, cough, mylagias that worsened this AM.    ED course:  O2 saturation 88%.  CT abd/pelvis: Large consolidation with near complete collapse of the right middle and lower lobes.  There is partial atelectasis of the right upper lobe.  There is a small to moderate Rt pl effusion extending along the lateral Rt hemithorax.  Pt was given vancomcyin, aztreonam, and NS IV fluid bolus.      Assessment and Plan:   Assessment:  · Assessment		  Pt is a 61 year female with a PMHx notable for kidney stone, and chronic back pain.  She presents to the ED with complaints of Rt sided flank pain, fever for the past few days.  Pt states that she was advised by Dr. Cool to come into the ED for further evaluation and treatment due to symptoms and low O2 saturation.  Pt state that she is experiencing sharp pain to her right side, back pain, cough, mylagias.    ED course:  O2 saturation 88%.  CT abd/pelvis: Large consolidation with near complete collapse of the right middle and lower lobes.  There is partial atelectasis of the right upper lobe.  There is a small to moderate Rt pl effusion extending along the lateral Rt hemithorax.  Pt was given vancomcyin, aztreonam, and NS IV fluid bolus.     Pt is a 61 year female with a PMHx notable for kidney stone right side who presents to the ED with complaints of Rt sided flank pain, fever for the past few days.  Pt states that she was advised by Dr. Cool to come into the ED for further evaluation and treatment due to symptoms and low O2 saturation.  Pt state that she is experiencing sharp pain to her right side, back pain, cough, mylagias that worsened this AM.    ED course:  O2 saturation 88%.  CT abd/pelvis: Large consolidation with near complete collapse of the right middle and lower lobes.  There is partial atelectasis of the right upper lobe.  There is a small to moderate Rt pl effusion extending along the lateral Rt hemithorax.  Pt was given vancomcyin, aztreonam, and NS IV fluid bolus.    Problem/Plan - 1:  ·  Problem: Respiratory failure with hypoxia, unspecified chronicity.  Plan: Likely related to pneumonia   - Duonebs ATC and prn  - O2 supplementation.   - encourage IS  - ID consult    Problem/Plan - 2:  ·  Problem: Lobar pneumonia.  Plan: Blood culture's x 2 already collected in ED  -f/u sputum gram stain/culture  -f/u urine legionella antigen  -s/p IV vancomcyin / aztreonam in ED  -Continuue with aztreonam and consult ID.     Problem/Plan - 3:  ·  Problem: Parapneumonic effusion.  Plan: Likely related to lobar pneumonia.      Problem/Plan - 4:  ·  Problem: Leukocytosis, unspecified type.  Plan: Likely related to lobar pneumonia  Monitor while on antibiotics.     Problem/Plan - 5:  ·  Problem: Chronic back pain, unspecified back location, unspecified back pain laterality.    - ISTOP checked  - resume Pain medication regimen from home   - contineu Oxycodone 30mg every 4 hours, MS Contin 30mg every 12 hours, Gabapentin and Lyrica      Case discussed with Dr Sánchez. Plan explained to patient.

## 2017-04-14 LAB
ANION GAP SERPL CALC-SCNC: 10 MMOL/L — SIGNIFICANT CHANGE UP (ref 5–17)
BUN SERPL-MCNC: 7 MG/DL — SIGNIFICANT CHANGE UP (ref 7–23)
CALCIUM SERPL-MCNC: 9.6 MG/DL — SIGNIFICANT CHANGE UP (ref 8.5–10.1)
CHLORIDE SERPL-SCNC: 99 MMOL/L — SIGNIFICANT CHANGE UP (ref 96–108)
CO2 SERPL-SCNC: 29 MMOL/L — SIGNIFICANT CHANGE UP (ref 22–31)
CREAT SERPL-MCNC: 0.52 MG/DL — SIGNIFICANT CHANGE UP (ref 0.5–1.3)
GLUCOSE SERPL-MCNC: 106 MG/DL — HIGH (ref 70–99)
HCT VFR BLD CALC: 36.4 % — SIGNIFICANT CHANGE UP (ref 34.5–45)
HGB BLD-MCNC: 12.6 G/DL — SIGNIFICANT CHANGE UP (ref 11.5–15.5)
MCHC RBC-ENTMCNC: 31.4 PG — SIGNIFICANT CHANGE UP (ref 27–34)
MCHC RBC-ENTMCNC: 34.5 GM/DL — SIGNIFICANT CHANGE UP (ref 32–36)
MCV RBC AUTO: 91 FL — SIGNIFICANT CHANGE UP (ref 80–100)
PLATELET # BLD AUTO: 385 K/UL — SIGNIFICANT CHANGE UP (ref 150–400)
POTASSIUM SERPL-MCNC: 3.3 MMOL/L — LOW (ref 3.5–5.3)
POTASSIUM SERPL-SCNC: 3.3 MMOL/L — LOW (ref 3.5–5.3)
RBC # BLD: 4 M/UL — SIGNIFICANT CHANGE UP (ref 3.8–5.2)
RBC # FLD: 11.7 % — SIGNIFICANT CHANGE UP (ref 10.3–14.5)
SODIUM SERPL-SCNC: 138 MMOL/L — SIGNIFICANT CHANGE UP (ref 135–145)
VANCOMYCIN TROUGH SERPL-MCNC: 3.3 UG/ML — LOW (ref 10–20)
WBC # BLD: 19.2 K/UL — HIGH (ref 3.8–10.5)
WBC # FLD AUTO: 19.2 K/UL — HIGH (ref 3.8–10.5)

## 2017-04-14 RX ORDER — POTASSIUM CHLORIDE 20 MEQ
20 PACKET (EA) ORAL
Qty: 0 | Refills: 0 | Status: COMPLETED | OUTPATIENT
Start: 2017-04-14 | End: 2017-04-14

## 2017-04-14 RX ADMIN — OXYCODONE HYDROCHLORIDE 30 MILLIGRAM(S): 5 TABLET ORAL at 22:23

## 2017-04-14 RX ADMIN — GABAPENTIN 300 MILLIGRAM(S): 400 CAPSULE ORAL at 22:24

## 2017-04-14 RX ADMIN — Medication 20 MILLIEQUIVALENT(S): at 11:34

## 2017-04-14 RX ADMIN — Medication 50 MILLIGRAM(S): at 13:48

## 2017-04-14 RX ADMIN — OXYCODONE HYDROCHLORIDE 30 MILLIGRAM(S): 5 TABLET ORAL at 05:33

## 2017-04-14 RX ADMIN — Medication 20 MILLIEQUIVALENT(S): at 10:28

## 2017-04-14 RX ADMIN — LIDOCAINE 1 PATCH: 4 CREAM TOPICAL at 23:34

## 2017-04-14 RX ADMIN — Medication 150 MILLIGRAM(S): at 06:01

## 2017-04-14 RX ADMIN — Medication 81 MILLIGRAM(S): at 11:34

## 2017-04-14 RX ADMIN — Medication 50 MILLIGRAM(S): at 06:01

## 2017-04-14 RX ADMIN — OXYCODONE HYDROCHLORIDE 30 MILLIGRAM(S): 5 TABLET ORAL at 15:38

## 2017-04-14 RX ADMIN — ZOLPIDEM TARTRATE 5 MILLIGRAM(S): 10 TABLET ORAL at 23:12

## 2017-04-14 RX ADMIN — Medication 3 MILLILITER(S): at 19:49

## 2017-04-14 RX ADMIN — OXYCODONE HYDROCHLORIDE 30 MILLIGRAM(S): 5 TABLET ORAL at 04:52

## 2017-04-14 RX ADMIN — Medication 50 MILLIGRAM(S): at 22:24

## 2017-04-14 RX ADMIN — OXYCODONE HYDROCHLORIDE 30 MILLIGRAM(S): 5 TABLET ORAL at 10:35

## 2017-04-14 RX ADMIN — MORPHINE SULFATE 30 MILLIGRAM(S): 50 CAPSULE, EXTENDED RELEASE ORAL at 06:01

## 2017-04-14 RX ADMIN — Medication 3 MILLILITER(S): at 07:52

## 2017-04-14 RX ADMIN — LIDOCAINE 1 PATCH: 4 CREAM TOPICAL at 11:34

## 2017-04-14 RX ADMIN — ATENOLOL 25 MILLIGRAM(S): 25 TABLET ORAL at 06:01

## 2017-04-14 RX ADMIN — MORPHINE SULFATE 30 MILLIGRAM(S): 50 CAPSULE, EXTENDED RELEASE ORAL at 17:25

## 2017-04-14 RX ADMIN — MORPHINE SULFATE 30 MILLIGRAM(S): 50 CAPSULE, EXTENDED RELEASE ORAL at 06:45

## 2017-04-14 RX ADMIN — CEFTRIAXONE 100 GRAM(S): 500 INJECTION, POWDER, FOR SOLUTION INTRAMUSCULAR; INTRAVENOUS at 13:48

## 2017-04-14 RX ADMIN — Medication 3 MILLILITER(S): at 13:24

## 2017-04-14 RX ADMIN — Medication 150 MILLIGRAM(S): at 17:59

## 2017-04-14 RX ADMIN — ENOXAPARIN SODIUM 40 MILLIGRAM(S): 100 INJECTION SUBCUTANEOUS at 10:28

## 2017-04-14 NOTE — PROGRESS NOTE ADULT - ATTENDING COMMENTS
Patient seen and examined with KEVIN Green.  Agree with physical exam and assessment and plan.  - acute hypoxic resp failure secondary to CAP  - continue meds for chronic pain Patient seen and examined with KEVIN Green.  Agree with physical exam and assessment and plan.  - acute hypoxic resp failure secondary to right middle and lower lobe PNA with R parapneumonic effusion   - continue rocephin vanco as per ID   - continue nebs, cough meds  - chronic back pain - ISTOp checked, continue chronic pain meds

## 2017-04-14 NOTE — PROGRESS NOTE ADULT - SUBJECTIVE AND OBJECTIVE BOX
Pt is a 61 year female with a PMHx notable for kidney stone right side, and chronic pain admitted 4/12 with c/o of R flank pain, fevers, cough myalgias, was sent in by urologist for further eval, here afebrile, hypoxic on RA to 88%,  wbc ct 23.9, CT chest with large consolidation with near complete collapse of the right middle and lower lobes, RUL atelectasis, moderate R pleural effusion, pt was given IV vancomycin/aztreonam d/t concern of infection.     feels better today  less cough  no fevers    MEDICATIONS  (STANDING):  ALBUTerol/ipratropium for Nebulization 3milliLiter(s) Nebulizer every 6 hours  lidocaine   Patch 1Patch Transdermal daily  enoxaparin Injectable 40milliGRAM(s) SubCutaneous every 24 hours  morphine ER Tablet 30milliGRAM(s) Oral every 12 hours  gabapentin 300milliGRAM(s) Oral at bedtime  pregabalin 50milliGRAM(s) Oral three times a day  ATENolol  Tablet 25milliGRAM(s) Oral daily  aspirin  chewable 81milliGRAM(s) Oral daily  vancomycin  IVPB 750milliGRAM(s) IV Intermittent every 12 hours  cefTRIAXone   IVPB  IV Intermittent   cefTRIAXone   IVPB 1Gram(s) IV Intermittent every 24 hours  potassium chloride    Tablet ER 20milliEquivalent(s) Oral every 2 hours      Vital Signs Last 24 Hrs  T(C): 37.2, Max: 37.3 (04-13 @ 11:31)  T(F): 99, Max: 99.1 (04-13 @ 11:31)  HR: 98 (82 - 110)  BP: 138/80 (127/72 - 150/76)  BP(mean): --  RR: 16 (16 - 20)  SpO2: 96% (95% - 98%)        PE:  Constitutional: NAD, laying in bed  HEENT: NC/AT, EOMI, PERRLA  Neck: supple  Back: no tenderness  Respiratory: decreased breath sounds, scattered rhonchi  Cardiovascular: S1S2 regular, no murmurs  Abdomen: soft, + tender, not distended, positive BS  Genitourinary: deferred  Rectal: deferred  Musculoskeletal: no muscle tenderness, no joint swelling or tenderness  Extremities: no pedal edema  Neurological:  moving all extremities, no focal deficits  Skin: no rashes    Labs:                                   12.6   19.2  )-----------( 385      ( 14 Apr 2017 06:01 )             36.4     04-14    138  |  99  |  7   ----------------------------<  106<H>  3.3<L>   |  29  |  0.52    Ca    9.6      14 Apr 2017 06:01    TPro  7.9  /  Alb  3.1<L>  /  TBili  0.5  /  DBili  x   /  AST  57<H>  /  ALT  46  /  AlkPhos  83  04-12           Cultures:     Culture - Blood (04.12.17 @ 15:35)    Specimen Source: .Blood None    Culture Results:   No growth to date.            Radiology: EXAM:  CT ABDOMEN AND PELVIS                            PROCEDURE DATE:  04/12/2017        INTERPRETATION:  CT OF THE CHEST, ABDOMEN AND PELVIS WITHOUT IV CONTRAST     CLINICAL INFORMATION:  hypoxia and right flank pain    TECHNIQUE: CT scan of thechest, abdomen, and pelvis was performed from   the thoracic inlet through the symphysis pubis without intravenous or   oral contrast.  Sagittal and coronal reformatted images provided.    COMPARISON: None.    FINDINGS:  Evaluation of the solid organs and vasculature limited in the absence of   intravenous contrast.    CHEST:    LUNGS, AIRWAYS, PLEURA: There are large consolidations with near complete   collapse of the right middle and lower lobes. There is partial   atelectasis of the right upper lobe. There is a small to moderate right   pleural effusion extending along the lateral right hemithorax. There is   eventration of the right hemidiaphragm and liver into the mid right   hemithorax. There are a few very small foci of groundglass opacity in the   left upper lobe, may reflect additional foci of infection. There is   fibrotic change at the left lung base.    VESSELS: Atherosclerotic changes..  HEART: Normal size. No pericardial effusion.  MEDIASTINUM AND ILEANA: Within normal limits.  CHEST WALL, AXILLA, LOWER NECK: Within normal limits.    ABDOMEN/PELVIS:    LIVER: No focal hepatic lesion. There is fatty infiltration of the liver.   There is eventration of the right hemidiaphragm and liver into the mid   right hemithorax.  GALLBLADDER: Unremarkable.  BILIARY TREE: Unremarkable.  PANCREAS: Unremarkable.  SPLEEN: Unremarkable.  ADRENALS: Unremarkable.  KIDNEYS/URETERS: Nonobstructive 5 x 1 mm calculus in the lower pole of   the right kidney. Small simple cyst in the lower poleof the right   kidney. No left renal calculi. No hydronephrosis bilaterally..    BOWEL: Partial sigmoid resection and anastomosis is present.  No bowel   obstruction or inflammatory process.    PERITONEUM/RETROPERITONEUM:  No ascites, free air or significant   lymphadenopathy.    BLADDER: Unremarkable.  REPRODUCTIVE ORGANS: Supracervical hysterectomy is suspected..    VASCULATURE: Within normal limits.  ABDOMINAL WALL:  Unremarkable    OSSEOUS STRUCTURES:  Multilevel degenerative changes of the spine are identified. The osseous   structures are intact without evidence of fracture or dislocation.    IMPRESSION:       Large consolidations with near complete collapse of the right middle and   lower lobes. There is partial atelectasis of the right upper lobe. There   is a small to moderate right pleural effusion extending along the lateral   right hemithorax. There is eventration of the right hemidiaphragm and   liver into the mid right hemithorax. Few very small foci of groundglass   opacityin the left upper lobe, may reflect additional foci of infection.     Rest of the chronic findings as above.        Advanced directives addressed: full resuscitation

## 2017-04-14 NOTE — PROGRESS NOTE ADULT - SUBJECTIVE AND OBJECTIVE BOX
Pt is a 61 year female with a PMHx notable for kidney stone right side, and chronic pain.  She presents to the ED with complaints of Rt sided flank pain, fever for the past few days.  Pt states that she was advised by Dr. Cool to come into the ED for further evaluation and treatment due to symptoms and low O2 saturation.  Pt state that she is experiencing sharp pain to her right side, back pain, cough, myalgias that has worsened.    ED course:  O2 saturation 88%.  CT abd/pelvis: Large consolidation with near complete collapse of the right middle and lower lobes.  There is partial atelectasis of the right upper lobe.  There is a small to moderate Rt pl effusion extending along the lateral Rt hemithorax.  Patient was started on IV ABT.     4/14     ROS:   All 10 systems reviewed and found to be negative with the exception of what has been described above.    Vital Signs Last 24 Hrs  T(C): 37.2, Max: 37.3 (04-13 @ 11:31)  T(F): 99, Max: 99.1 (04-13 @ 11:31)  HR: 98 (82 - 110)  BP: 138/80 (127/72 - 150/76)  BP(mean): --  RR: 16 (16 - 20)  SpO2: 96% (95% - 98%)        PHYSICAL EXAM:    General:Alert, well-developed  Neuro:Alert and oriented to person, place, and time. Able to communicate  well.  Sensation intact in all extremities. Appropriate affect.   HEENT: No JVD, no masses, Eyes anicteric, No carotid bruits. No lymphadenopathy,  Cardiovascular: regular , with normal S1 and S2. No murmurs, rubs,  or gallops. No JVD.   Lungs: clear.decreased breath sounds on the right- no wheezing.  Abdomen: Normoactive bowel sounds. Soft, flat, non-tender, and non-distended.  No hepatosplenomegaly, positive bowel sounds  no s/p tenderness  Skin: Warm, dry, well-perfused. No rashes or other lesions.    MEDICATIONS  (STANDING):  ALBUTerol/ipratropium for Nebulization 3milliLiter(s) Nebulizer every 6 hours  lidocaine   Patch 1Patch Transdermal daily  enoxaparin Injectable 40milliGRAM(s) SubCutaneous every 24 hours  morphine ER Tablet 30milliGRAM(s) Oral every 12 hours  gabapentin 300milliGRAM(s) Oral at bedtime  pregabalin 50milliGRAM(s) Oral three times a day  ATENolol  Tablet 25milliGRAM(s) Oral daily  aspirin  chewable 81milliGRAM(s) Oral daily  vancomycin  IVPB 750milliGRAM(s) IV Intermittent every 12 hours  cefTRIAXone   IVPB  IV Intermittent   cefTRIAXone   IVPB 1Gram(s) IV Intermittent every 24 hours  potassium chloride    Tablet ER 20milliEquivalent(s) Oral every 2 hours    MEDICATIONS  (PRN):  acetaminophen   Tablet 650milliGRAM(s) Oral every 6 hours PRN For Temp greater than 38.5 C (101.3 F)  ondansetron Injectable 4milliGRAM(s) IV Push every 6 hours PRN Nausea  zolpidem 5milliGRAM(s) Oral at bedtime PRN Insomnia  ALBUTerol    0.083% 2.5milliGRAM(s) Nebulizer every 4 hours PRN Shortness of Breath and/or Wheezing  acetaminophen   Tablet. 650milliGRAM(s) Oral every 6 hours PRN Mild Pain (1 - 3)  oxyCODONE IR 30milliGRAM(s) Oral every 4 hours PRN Moderate Pain (4 - 6)          EXAM:  CT ABDOMEN AND PELVIS                        PROCEDURE DATE:  04/12/2017    INTERPRETATION:  CT OF THE CHEST, ABDOMEN AND PELVIS WITHOUT IV CONTRAST   CLINICAL INFORMATION:  hypoxia and right flank pain  TECHNIQUE: CT scan of thechest, abdomen, and pelvis was performed from   the thoracic inlet through the symphysis pubis without intravenous or   oral contrast.  Sagittal and coronal reformatted images provided.  COMPARISON: None.  FINDINGS:  Evaluation of the solid organs and vasculature limited in the absence of   intravenous contrast.  CHEST:  LUNGS, AIRWAYS, PLEURA: There are large consolidations with near complete   collapse of the right middle and lower lobes. There is partial   atelectasis of the right upper lobe. There is a small to moderate right   pleural effusion extending along the lateral right hemithorax. There is   eventration of the right hemidiaphragm and liver into the mid right   hemithorax. There are a few very small foci of groundglass opacity in the   left upper lobe, may reflect additional foci of infection. There is   fibrotic change at the left lung base.  VESSELS: Atherosclerotic changes..  HEART: Normal size. No pericardial effusion.  MEDIASTINUM AND ILEANA: Within normal limits.  CHEST WALL, AXILLA, LOWER NECK: Within normal limits.  ABDOMEN/PELVIS:  LIVER: No focal hepatic lesion. There is fatty infiltration of the liver.   There is eventration of the right hemidiaphragm and liver into the mid   right hemithorax.  GALLBLADDER: Unremarkable.  BILIARY TREE: Unremarkable.  PANCREAS: Unremarkable.  SPLEEN: Unremarkable.  ADRENALS: Unremarkable.  KIDNEYS/URETERS: Nonobstructive 5 x 1 mm calculus in the lower pole of   the right kidney. Small simple cyst in the lower poleof the right   kidney. No left renal calculi. No hydronephrosis bilaterally..  BOWEL: Partial sigmoid resection and anastomosis is present.  No bowel   obstruction or inflammatory process.    PERITONEUM/RETROPERITONEUM:  No ascites, free air or significant   lymphadenopathy.  BLADDER: Unremarkable.  REPRODUCTIVE ORGANS: Supracervical hysterectomy is suspected..  VASCULATURE: Within normal limits.  ABDOMINAL WALL:  Unremarkable  OSSEOUS STRUCTURES:  Multilevel degenerative changes of the spine are identified. The osseous   structures are intact without evidence of fracture or dislocation.  IMPRESSION:   Large consolidations with near complete collapse of the right middle and   lower lobes. There is partial atelectasis of the right upper lobe. There   is a small to moderate right pleural effusion extending along the lateral   right hemithorax. There is eventration of the right hemidiaphragm and   liver into the mid right hemithorax. Few very small foci of groundglass   opacityin the left upper lobe, may reflect additional foci of infection.   Pt is a 61 year female with a PMHx notable for kidney stone right side who presents to the ED with complaints of Rt sided flank pain, fever for the past few days.  Pt states that she was advised by Dr. Cool to come into the ED for further evaluation and treatment due to symptoms and low O2 saturation.  Pt state that she is experiencing sharp pain to her right side, back pain, cough, mylagias that worsened this AM.    ED course:  O2 saturation 88%.  CT abd/pelvis: Large consolidation with near complete collapse of the right middle and lower lobes.  There is partial atelectasis of the right upper lobe.  There is a small to moderate Rt pl effusion extending along the lateral Rt hemithorax.  Pt was given vancomcyin, aztreonam, and NS IV fluid bolus.      Assessment and Plan:   Assessment:  		  Pt is a 61 year female with a PMHx notable for kidney stone, and chronic back pain.  She presents to the ED with complaints of Rt sided flank pain and fever. Pt states that she was advised by Dr. Cool to come into the ED for further evaluation and treatment due to symptoms and low O2 saturation.  Pt state that she is experiencing sharp pain to her right side, back pain, cough, myalgias.    ED course:  O2 saturation 88%.  CT abd/pelvis: Large consolidation with near complete collapse of the right middle and lower lobes.  There is partial atelectasis of the right upper lobe.  There is a small to moderate Rt pl effusion extending along the lateral Rt hemithorax.  Pt was given vancomcyin, aztreonam, and NS IV fluid bolus.     Pt is a 61 year female with a PMHx notable for kidney stone right side who presents to the ED with complaints of Rt sided flank pain, fever for the past few days.  Pt states that she was advised by Dr. Cool to come into the ED for further evaluation and treatment due to symptoms and low O2 saturation.  Pt state that she is experiencing sharp pain to her right side, back pain, cough, mylagias that worsened this AM.    ED course:  O2 saturation 88%.  CT abd/pelvis: Large consolidation with near complete collapse of the right middle and lower lobes.  There is partial atelectasis of the right upper lobe.  There is a small to moderate Rt pl effusion extending along the lateral Rt hemithorax.  Pt was given vancomcyin, aztreonam, and NS IV fluid bolus.    Problem/Plan - 1:  ·   Respiratory failure with hypoxia, unspecified chronicity.  Plan: multilobar PNA/R parapneumonic effusion- suspect GNR   - [place] ATC and prn  - O2 supplementation.   - encourage IS  - ID consult appreciated- DC aztreonam and start Vanco and Rocephin #2    Problem/Plan - 2:  ·  Multilobar pneumonia.  Plan: BC x2  f/u results  - f/u sputum gram stain/culture  - f/u urine legionella antigen- negative  - continue vanc and ceftriaxone as per ID        Problem/Plan - 3:  ·  Parapneumonic effusion.  Plan: Likely related to lobar pneumonia.      Problem/Plan - 4:  ·  Leukocytosis  Plan: Likely related to lobar pneumonia  Monitor while on antibiotics.     Problem/Plan - 5:  ·  Chronic back pain  - ISTOP checked  - resume Pain medication regimen from home   - continue Oxycodone 30mg every 4 hours, MS Contin 30mg every 12 hours, Gabapentin and Lyrica      Case discussed with Dr Sánchez. Plan explained to patient. Pt is a 61 year female with a PMHx notable for kidney stone right side, and chronic pain.  She presents to the ED with complaints of Rt sided flank pain, fever for the past few days.  Pt states that she was advised by Dr. Cool to come into the ED for further evaluation and treatment due to symptoms and low O2 saturation.  Pt state that she is experiencing sharp pain to her right side, back pain, cough, myalgias that has worsened.    ED course:  O2 saturation 88%.  CT abd/pelvis: Large consolidation with near complete collapse of the right middle and lower lobes.  There is partial atelectasis of the right upper lobe.  There is a small to moderate Rt pl effusion extending along the lateral Rt hemithorax.  Patient was started on IV ABT.     4/14 - pain is better controlled this am with the current pain regimen. no new complaints.     ROS:   All 10 systems reviewed and found to be negative with the exception of what has been described above.    Vital Signs Last 24 Hrs  T(C): 37.2, Max: 37.3 (04-13 @ 11:31)  T(F): 99, Max: 99.1 (04-13 @ 11:31)  HR: 98 (82 - 110)  BP: 138/80 (127/72 - 150/76)  BP(mean): --  RR: 16 (16 - 20)  SpO2: 96% (95% - 98%)        PHYSICAL EXAM:    General: Alert, well-developed  Neuro: Alert and oriented to person, place, and time. Able to communicate  well.  Sensation intact in all extremities. Appropriate affect.   HEENT: No JVD, no masses, Eyes anicteric, No carotid bruits. No lymphadenopathy,  Cardiovascular: regular , with normal S1 and S2. No murmurs, rubs  Lungs: clear, decreased breath sounds on the right- no wheezing.  Abdomen: Normoactive bowel sounds. Soft, flat, non-tender, and non-distended.  No hepatosplenomegaly, positive bowel sounds  no s/p tenderness  Skin: Warm, dry, well-perfused. No rashes or other lesions.    MEDICATIONS  (STANDING):  ALBUTerol/ipratropium for Nebulization 3milliLiter(s) Nebulizer every 6 hours  lidocaine   Patch 1Patch Transdermal daily  enoxaparin Injectable 40milliGRAM(s) SubCutaneous every 24 hours  morphine ER Tablet 30milliGRAM(s) Oral every 12 hours  gabapentin 300milliGRAM(s) Oral at bedtime  pregabalin 50milliGRAM(s) Oral three times a day  ATENolol  Tablet 25milliGRAM(s) Oral daily  aspirin  chewable 81milliGRAM(s) Oral daily  vancomycin  IVPB 750milliGRAM(s) IV Intermittent every 12 hours  cefTRIAXone   IVPB  IV Intermittent   cefTRIAXone   IVPB 1Gram(s) IV Intermittent every 24 hours  potassium chloride    Tablet ER 20milliEquivalent(s) Oral every 2 hours    MEDICATIONS  (PRN):  acetaminophen   Tablet 650milliGRAM(s) Oral every 6 hours PRN For Temp greater than 38.5 C (101.3 F)  ondansetron Injectable 4milliGRAM(s) IV Push every 6 hours PRN Nausea  zolpidem 5milliGRAM(s) Oral at bedtime PRN Insomnia  ALBUTerol    0.083% 2.5milliGRAM(s) Nebulizer every 4 hours PRN Shortness of Breath and/or Wheezing  acetaminophen   Tablet. 650milliGRAM(s) Oral every 6 hours PRN Mild Pain (1 - 3)  oxyCODONE IR 30milliGRAM(s) Oral every 4 hours PRN Moderate Pain (4 - 6)          EXAM:  CT ABDOMEN AND PELVIS                        PROCEDURE DATE:  04/12/2017    INTERPRETATION:  CT OF THE CHEST, ABDOMEN AND PELVIS WITHOUT IV CONTRAST   CLINICAL INFORMATION:  hypoxia and right flank pain  TECHNIQUE: CT scan of thechest, abdomen, and pelvis was performed from   the thoracic inlet through the symphysis pubis without intravenous or   oral contrast.  Sagittal and coronal reformatted images provided.  COMPARISON: None.  FINDINGS:  Evaluation of the solid organs and vasculature limited in the absence of   intravenous contrast.  CHEST:  LUNGS, AIRWAYS, PLEURA: There are large consolidations with near complete   collapse of the right middle and lower lobes. There is partial   atelectasis of the right upper lobe. There is a small to moderate right   pleural effusion extending along the lateral right hemithorax. There is   eventration of the right hemidiaphragm and liver into the mid right   hemithorax. There are a few very small foci of groundglass opacity in the   left upper lobe, may reflect additional foci of infection. There is   fibrotic change at the left lung base.  VESSELS: Atherosclerotic changes..  HEART: Normal size. No pericardial effusion.  MEDIASTINUM AND ILEANA: Within normal limits.  CHEST WALL, AXILLA, LOWER NECK: Within normal limits.  ABDOMEN/PELVIS:  LIVER: No focal hepatic lesion. There is fatty infiltration of the liver.   There is eventration of the right hemidiaphragm and liver into the mid   right hemithorax.  GALLBLADDER: Unremarkable.  BILIARY TREE: Unremarkable.  PANCREAS: Unremarkable.  SPLEEN: Unremarkable.  ADRENALS: Unremarkable.  KIDNEYS/URETERS: Nonobstructive 5 x 1 mm calculus in the lower pole of   the right kidney. Small simple cyst in the lower poleof the right   kidney. No left renal calculi. No hydronephrosis bilaterally..  BOWEL: Partial sigmoid resection and anastomosis is present.  No bowel   obstruction or inflammatory process.    PERITONEUM/RETROPERITONEUM:  No ascites, free air or significant   lymphadenopathy.  BLADDER: Unremarkable.  REPRODUCTIVE ORGANS: Supracervical hysterectomy is suspected..  VASCULATURE: Within normal limits.  ABDOMINAL WALL:  Unremarkable  OSSEOUS STRUCTURES:  Multilevel degenerative changes of the spine are identified. The osseous   structures are intact without evidence of fracture or dislocation.  IMPRESSION:   Large consolidations with near complete collapse of the right middle and   lower lobes. There is partial atelectasis of the right upper lobe. There   is a small to moderate right pleural effusion extending along the lateral   right hemithorax. There is eventration of the right hemidiaphragm and   liver into the mid right hemithorax. Few very small foci of groundglass   opacityin the left upper lobe, may reflect additional foci of infection.   Pt is a 61 year female with a PMHx notable for kidney stone right side who presents to the ED with complaints of Rt sided flank pain, fever for the past few days.  Pt states that she was advised by Dr. Cool to come into the ED for further evaluation and treatment due to symptoms and low O2 saturation.  Pt state that she is experiencing sharp pain to her right side, back pain, cough, mylagias that worsened this AM.    ED course:  O2 saturation 88%.  CT abd/pelvis: Large consolidation with near complete collapse of the right middle and lower lobes.  There is partial atelectasis of the right upper lobe.  There is a small to moderate Rt pl effusion extending along the lateral Rt hemithorax.  Pt was given vancomcyin, aztreonam, and NS IV fluid bolus.      Assessment and Plan:   Assessment:  		  Pt is a 61 year female with a PMHx notable for kidney stone, and chronic back pain.  She presents to the ED with complaints of Rt sided flank pain and fever. Pt states that she was advised by Dr. Cool to come into the ED for further evaluation and treatment due to symptoms and low O2 saturation.  Pt state that she is experiencing sharp pain to her right side, back pain, cough, myalgias.    ED course:  O2 saturation 88%.  CT abd/pelvis: Large consolidation with near complete collapse of the right middle and lower lobes.  There is partial atelectasis of the right upper lobe.  There is a small to moderate Rt pl effusion extending along the lateral Rt hemithorax.  Pt was given vancomcyin, aztreonam, and NS IV fluid bolus.     Pt is a 61 year female with a PMHx notable for kidney stone right side who presents to the ED with complaints of Rt sided flank pain, fever for the past few days.  Pt states that she was advised by Dr. Cool to come into the ED for further evaluation and treatment due to symptoms and low O2 saturation.  Pt state that she is experiencing sharp pain to her right side, back pain, cough, mylagias that worsened this AM.    ED course:  O2 saturation 88%.  CT abd/pelvis: Large consolidation with near complete collapse of the right middle and lower lobes.  There is partial atelectasis of the right upper lobe.  There is a small to moderate Rt pl effusion extending along the lateral Rt hemithorax.  Pt was given vancomcyin, aztreonam, and NS IV fluid bolus.    Problem/Plan - 1:  ·   Respiratory failure with hypoxia, unspecified chronicity.  Plan: multilobar PNA/R parapneumonic effusion- suspect GNR   - [place] ATC and prn  - O2 supplementation.   - encourage IS  - ID consult appreciated- DC aztreonam and continue  Vanco and Rocephin #2    Problem/Plan - 2:  ·  Multilobar pneumonia.  Plan: BC x2  f/u results  - f/u sputum gram stain/culture  - f/u urine legionella antigen- negative  - continue vanc and ceftriaxone as per ID        Problem/Plan - 3:  ·  Parapneumonic effusion.  Plan: Likely related to lobar pneumonia.      Problem/Plan - 4:  ·  Leukocytosis  Plan: Likely related to lobar pneumonia  Monitor while on antibiotics.     Problem/Plan - 5:  ·  Chronic back pain  - ISTOP checked  - resume Pain medication regimen from home   - continue Oxycodone 30mg every 4 hours, MS Contin 30mg every 12 hours, Gabapentin and Lyrica        Case discussed with Dr Sánchez. Plan explained to patient.

## 2017-04-15 LAB
ANION GAP SERPL CALC-SCNC: 10 MMOL/L — SIGNIFICANT CHANGE UP (ref 5–17)
BUN SERPL-MCNC: 8 MG/DL — SIGNIFICANT CHANGE UP (ref 7–23)
CALCIUM SERPL-MCNC: 9.1 MG/DL — SIGNIFICANT CHANGE UP (ref 8.5–10.1)
CHLORIDE SERPL-SCNC: 101 MMOL/L — SIGNIFICANT CHANGE UP (ref 96–108)
CO2 SERPL-SCNC: 28 MMOL/L — SIGNIFICANT CHANGE UP (ref 22–31)
CREAT SERPL-MCNC: 0.48 MG/DL — LOW (ref 0.5–1.3)
GLUCOSE SERPL-MCNC: 125 MG/DL — HIGH (ref 70–99)
HCT VFR BLD CALC: 37.6 % — SIGNIFICANT CHANGE UP (ref 34.5–45)
HGB BLD-MCNC: 12.7 G/DL — SIGNIFICANT CHANGE UP (ref 11.5–15.5)
MCHC RBC-ENTMCNC: 30.7 PG — SIGNIFICANT CHANGE UP (ref 27–34)
MCHC RBC-ENTMCNC: 33.7 GM/DL — SIGNIFICANT CHANGE UP (ref 32–36)
MCV RBC AUTO: 91 FL — SIGNIFICANT CHANGE UP (ref 80–100)
PLATELET # BLD AUTO: 396 K/UL — SIGNIFICANT CHANGE UP (ref 150–400)
POTASSIUM SERPL-MCNC: 3.5 MMOL/L — SIGNIFICANT CHANGE UP (ref 3.5–5.3)
POTASSIUM SERPL-SCNC: 3.5 MMOL/L — SIGNIFICANT CHANGE UP (ref 3.5–5.3)
RBC # BLD: 4.13 M/UL — SIGNIFICANT CHANGE UP (ref 3.8–5.2)
RBC # FLD: 12 % — SIGNIFICANT CHANGE UP (ref 10.3–14.5)
SODIUM SERPL-SCNC: 139 MMOL/L — SIGNIFICANT CHANGE UP (ref 135–145)
WBC # BLD: 12.9 K/UL — HIGH (ref 3.8–10.5)
WBC # FLD AUTO: 12.9 K/UL — HIGH (ref 3.8–10.5)

## 2017-04-15 PROCEDURE — 71010: CPT | Mod: 26

## 2017-04-15 RX ADMIN — Medication 50 MILLIGRAM(S): at 13:12

## 2017-04-15 RX ADMIN — LIDOCAINE 1 PATCH: 4 CREAM TOPICAL at 11:15

## 2017-04-15 RX ADMIN — Medication 150 MILLIGRAM(S): at 17:29

## 2017-04-15 RX ADMIN — Medication 81 MILLIGRAM(S): at 11:15

## 2017-04-15 RX ADMIN — CEFTRIAXONE 100 GRAM(S): 500 INJECTION, POWDER, FOR SOLUTION INTRAMUSCULAR; INTRAVENOUS at 14:21

## 2017-04-15 RX ADMIN — OXYCODONE HYDROCHLORIDE 30 MILLIGRAM(S): 5 TABLET ORAL at 16:23

## 2017-04-15 RX ADMIN — ENOXAPARIN SODIUM 40 MILLIGRAM(S): 100 INJECTION SUBCUTANEOUS at 09:43

## 2017-04-15 RX ADMIN — OXYCODONE HYDROCHLORIDE 30 MILLIGRAM(S): 5 TABLET ORAL at 11:18

## 2017-04-15 RX ADMIN — GABAPENTIN 300 MILLIGRAM(S): 400 CAPSULE ORAL at 22:24

## 2017-04-15 RX ADMIN — Medication 150 MILLIGRAM(S): at 06:06

## 2017-04-15 RX ADMIN — Medication 650 MILLIGRAM(S): at 22:28

## 2017-04-15 RX ADMIN — Medication 3 MILLILITER(S): at 14:29

## 2017-04-15 RX ADMIN — OXYCODONE HYDROCHLORIDE 30 MILLIGRAM(S): 5 TABLET ORAL at 20:36

## 2017-04-15 RX ADMIN — OXYCODONE HYDROCHLORIDE 30 MILLIGRAM(S): 5 TABLET ORAL at 15:51

## 2017-04-15 RX ADMIN — ATENOLOL 25 MILLIGRAM(S): 25 TABLET ORAL at 06:06

## 2017-04-15 RX ADMIN — Medication 3 MILLILITER(S): at 20:55

## 2017-04-15 RX ADMIN — Medication 200 MILLIGRAM(S): at 13:12

## 2017-04-15 RX ADMIN — ZOLPIDEM TARTRATE 5 MILLIGRAM(S): 10 TABLET ORAL at 22:24

## 2017-04-15 RX ADMIN — Medication 50 MILLIGRAM(S): at 06:09

## 2017-04-15 RX ADMIN — MORPHINE SULFATE 30 MILLIGRAM(S): 50 CAPSULE, EXTENDED RELEASE ORAL at 17:28

## 2017-04-15 RX ADMIN — MORPHINE SULFATE 30 MILLIGRAM(S): 50 CAPSULE, EXTENDED RELEASE ORAL at 06:09

## 2017-04-15 RX ADMIN — Medication 50 MILLIGRAM(S): at 22:24

## 2017-04-15 NOTE — PROGRESS NOTE ADULT - SUBJECTIVE AND OBJECTIVE BOX
Pt is a 61 year female with a PMHx notable for kidney stone right side, and chronic pain.  She presents to the ED with complaints of Rt sided flank pain, fever for the past few days.  Pt states that she was advised by Dr. Cool to come into the ED for further evaluation and treatment due to symptoms and low O2 saturation.  Pt state that she is experiencing sharp pain to her right side, back pain, cough, myalgias that has worsened.    ED course:  O2 saturation 88%.  CT abd/pelvis: Large consolidation with near complete collapse of the right middle and lower lobes.  There is partial atelectasis of the right upper lobe.  There is a small to moderate Rt pl effusion extending along the lateral Rt hemithorax.  Patient was started on IV ABT.     4/14 - pain is better controlled this am with the current pain regimen. no new complaints.   4/15: States she has some rib pain, worse with breathing.  Also states she coughed up some thick white sputum.  Otherwise no fever, chills. Breathing seems to be improving.    ROS:   All 10 systems reviewed and found to be negative with the exception of what has been described above.    Vital Signs Last 24 Hrs  Vital Signs Last 24 Hrs  T(C): 37.1, Max: 37.3 (04-14 @ 20:49)  T(F): 98.8, Max: 99.2 (04-14 @ 20:49)  HR: 89 (88 - 105)  BP: 130/79 (130/79 - 146/70)  BP(mean): --  RR: 20 (18 - 20)  SpO2: 98% (93% - 98%)        PHYSICAL EXAM:    General: Alert, well-developed  Neuro: Alert and oriented to person, place, and time. Able to communicate  well.  Sensation intact in all extremities. Appropriate affect.   HEENT: No JVD, no masses, Eyes anicteric, No carotid bruits. No lymphadenopathy,  Cardiovascular: regular , with normal S1 and S2. No murmurs, rubs  Lungs: clear, decreased breath sounds on the right- no wheezing.  Abdomen: Normoactive bowel sounds. Soft, flat, non-tender, and non-distended.  No hepatosplenomegaly, positive bowel sounds  no s/p tenderness  Skin: Warm, dry, well-perfused. No rashes or other lesions.    MEDICATIONS  (STANDING):  ALBUTerol/ipratropium for Nebulization 3milliLiter(s) Nebulizer every 6 hours  lidocaine   Patch 1Patch Transdermal daily  enoxaparin Injectable 40milliGRAM(s) SubCutaneous every 24 hours  morphine ER Tablet 30milliGRAM(s) Oral every 12 hours  gabapentin 300milliGRAM(s) Oral at bedtime  pregabalin 50milliGRAM(s) Oral three times a day  ATENolol  Tablet 25milliGRAM(s) Oral daily  aspirin  chewable 81milliGRAM(s) Oral daily  vancomycin  IVPB 750milliGRAM(s) IV Intermittent every 12 hours  cefTRIAXone   IVPB  IV Intermittent   cefTRIAXone   IVPB 1Gram(s) IV Intermittent every 24 hours    MEDICATIONS  (PRN):  acetaminophen   Tablet 650milliGRAM(s) Oral every 6 hours PRN For Temp greater than 38.5 C (101.3 F)  ondansetron Injectable 4milliGRAM(s) IV Push every 6 hours PRN Nausea  zolpidem 5milliGRAM(s) Oral at bedtime PRN Insomnia  ALBUTerol    0.083% 2.5milliGRAM(s) Nebulizer every 4 hours PRN Shortness of Breath and/or Wheezing  acetaminophen   Tablet. 650milliGRAM(s) Oral every 6 hours PRN Mild Pain (1 - 3)  oxyCODONE IR 30milliGRAM(s) Oral every 4 hours PRN Moderate Pain (4 - 6)  guaiFENesin    Syrup 200milliGRAM(s) Oral every 6 hours PRN Cough      MEDICATIONS  (STANDING):  ALBUTerol/ipratropium for Nebulization 3milliLiter(s) Nebulizer every 6 hours  lidocaine   Patch 1Patch Transdermal daily  enoxaparin Injectable 40milliGRAM(s) SubCutaneous every 24 hours  morphine ER Tablet 30milliGRAM(s) Oral every 12 hours  gabapentin 300milliGRAM(s) Oral at bedtime  pregabalin 50milliGRAM(s) Oral three times a day  ATENolol  Tablet 25milliGRAM(s) Oral daily  aspirin  chewable 81milliGRAM(s) Oral daily  vancomycin  IVPB 750milliGRAM(s) IV Intermittent every 12 hours  cefTRIAXone   IVPB  IV Intermittent   cefTRIAXone   IVPB 1Gram(s) IV Intermittent every 24 hours    MEDICATIONS  (PRN):  acetaminophen   Tablet 650milliGRAM(s) Oral every 6 hours PRN For Temp greater than 38.5 C (101.3 F)  ondansetron Injectable 4milliGRAM(s) IV Push every 6 hours PRN Nausea  zolpidem 5milliGRAM(s) Oral at bedtime PRN Insomnia  ALBUTerol    0.083% 2.5milliGRAM(s) Nebulizer every 4 hours PRN Shortness of Breath and/or Wheezing  acetaminophen   Tablet. 650milliGRAM(s) Oral every 6 hours PRN Mild Pain (1 - 3)  oxyCODONE IR 30milliGRAM(s) Oral every 4 hours PRN Moderate Pain (4 - 6)  guaiFENesin    Syrup 200milliGRAM(s) Oral every 6 hours PRN Cough      Pt is a 61 year female with a PMHx notable for kidney stone right side who presents to the ED with complaints of Rt sided flank pain, fever for the past few days.  Pt states that she was advised by Dr. Cool to come into the ED for further evaluation and treatment due to symptoms and low O2 saturation.  Pt state that she is experiencing sharp pain to her right side, back pain, cough, mylagias that worsened this AM.    ED course:  O2 saturation 88%.  CT abd/pelvis: Large consolidation with near complete collapse of the right middle and lower lobes.  There is partial atelectasis of the right upper lobe.  There is a small to moderate Rt pl effusion extending along the lateral Rt hemithorax.  Pt was given vancomcyin, aztreonam, and NS IV fluid bolus.      Assessment and Plan:   Assessment:  		  61 year female with a PMHx notable for kidney stone, and chronic back pain who presents to the ED with Rt sided flank pain and fever secondary to pneumonia.    Pt is a 61 year female with a PMHx notable for kidney stone right side who presents to the ED with complaints of Rt sided flank pain, fever for the past few days.  Pt states that she was advised by Dr. Cool to come into the ED for further evaluation and treatment due to symptoms and low O2 saturation.  Pt state that she is experiencing sharp pain to her right side, back pain, cough, mylagias that worsened this AM.    ED course:  O2 saturation 88%.  CT abd/pelvis: Large consolidation with near complete collapse of the right middle and lower lobes.  There is partial atelectasis of the right upper lobe.  There is a small to moderate Rt pl effusion extending along the lateral Rt hemithorax.  Pt was given vancomcyin, aztreonam, and NS IV fluid bolus.    Problem/Plan - 1:  ·   Respiratory failure with hypoxia, unspecified chronicity secondary to multilobar PNA with R parapneumonic effusion- suspect GNR   - leukocytosis - monitor  - EKG NSR  - Duonebs ATC and prn  - O2 supplementation.   - encourage IS  - ID consult appreciated- DC aztreonam and continue  Vanco and Rocephin #3  - repeat chest xray today  - sputum culture if possible  - urine legionella antigen- negative      Problem/Plan - 2:  ·  Chronic back pain  - ISTOP checked  - resume Pain medication regimen from home   - continue Oxycodone 30mg every 4 hours, MS Contin 30mg every 12 hours, Gabapentin and Lyrica      Attending Statement:  >35 minutes spent on total encounter; more than 50% of the visit was spent counseling and/or coordinating care by the attending physician.

## 2017-04-16 LAB
ANION GAP SERPL CALC-SCNC: 7 MMOL/L — SIGNIFICANT CHANGE UP (ref 5–17)
BUN SERPL-MCNC: 7 MG/DL — SIGNIFICANT CHANGE UP (ref 7–23)
CALCIUM SERPL-MCNC: 9.4 MG/DL — SIGNIFICANT CHANGE UP (ref 8.5–10.1)
CHLORIDE SERPL-SCNC: 99 MMOL/L — SIGNIFICANT CHANGE UP (ref 96–108)
CO2 SERPL-SCNC: 32 MMOL/L — HIGH (ref 22–31)
CREAT SERPL-MCNC: 0.57 MG/DL — SIGNIFICANT CHANGE UP (ref 0.5–1.3)
GLUCOSE SERPL-MCNC: 183 MG/DL — HIGH (ref 70–99)
HCT VFR BLD CALC: 37.2 % — SIGNIFICANT CHANGE UP (ref 34.5–45)
HGB BLD-MCNC: 12.4 G/DL — SIGNIFICANT CHANGE UP (ref 11.5–15.5)
MCHC RBC-ENTMCNC: 30.3 PG — SIGNIFICANT CHANGE UP (ref 27–34)
MCHC RBC-ENTMCNC: 33.4 GM/DL — SIGNIFICANT CHANGE UP (ref 32–36)
MCV RBC AUTO: 90.8 FL — SIGNIFICANT CHANGE UP (ref 80–100)
PLATELET # BLD AUTO: 416 K/UL — HIGH (ref 150–400)
POTASSIUM SERPL-MCNC: 3.5 MMOL/L — SIGNIFICANT CHANGE UP (ref 3.5–5.3)
POTASSIUM SERPL-SCNC: 3.5 MMOL/L — SIGNIFICANT CHANGE UP (ref 3.5–5.3)
RBC # BLD: 4.1 M/UL — SIGNIFICANT CHANGE UP (ref 3.8–5.2)
RBC # FLD: 12 % — SIGNIFICANT CHANGE UP (ref 10.3–14.5)
SODIUM SERPL-SCNC: 138 MMOL/L — SIGNIFICANT CHANGE UP (ref 135–145)
WBC # BLD: 12.6 K/UL — HIGH (ref 3.8–10.5)
WBC # FLD AUTO: 12.6 K/UL — HIGH (ref 3.8–10.5)

## 2017-04-16 RX ORDER — POTASSIUM CHLORIDE 20 MEQ
20 PACKET (EA) ORAL ONCE
Qty: 0 | Refills: 0 | Status: COMPLETED | OUTPATIENT
Start: 2017-04-16 | End: 2017-04-16

## 2017-04-16 RX ADMIN — Medication 150 MILLIGRAM(S): at 17:48

## 2017-04-16 RX ADMIN — Medication 650 MILLIGRAM(S): at 20:20

## 2017-04-16 RX ADMIN — LIDOCAINE 1 PATCH: 4 CREAM TOPICAL at 11:16

## 2017-04-16 RX ADMIN — Medication 81 MILLIGRAM(S): at 11:16

## 2017-04-16 RX ADMIN — Medication 50 MILLIGRAM(S): at 13:10

## 2017-04-16 RX ADMIN — OXYCODONE HYDROCHLORIDE 30 MILLIGRAM(S): 5 TABLET ORAL at 12:15

## 2017-04-16 RX ADMIN — OXYCODONE HYDROCHLORIDE 30 MILLIGRAM(S): 5 TABLET ORAL at 20:55

## 2017-04-16 RX ADMIN — MORPHINE SULFATE 30 MILLIGRAM(S): 50 CAPSULE, EXTENDED RELEASE ORAL at 17:48

## 2017-04-16 RX ADMIN — OXYCODONE HYDROCHLORIDE 30 MILLIGRAM(S): 5 TABLET ORAL at 20:38

## 2017-04-16 RX ADMIN — ENOXAPARIN SODIUM 40 MILLIGRAM(S): 100 INJECTION SUBCUTANEOUS at 11:15

## 2017-04-16 RX ADMIN — OXYCODONE HYDROCHLORIDE 30 MILLIGRAM(S): 5 TABLET ORAL at 08:17

## 2017-04-16 RX ADMIN — Medication 50 MILLIGRAM(S): at 21:53

## 2017-04-16 RX ADMIN — OXYCODONE HYDROCHLORIDE 30 MILLIGRAM(S): 5 TABLET ORAL at 13:01

## 2017-04-16 RX ADMIN — GABAPENTIN 300 MILLIGRAM(S): 400 CAPSULE ORAL at 21:53

## 2017-04-16 RX ADMIN — Medication 3 MILLILITER(S): at 13:47

## 2017-04-16 RX ADMIN — Medication 3 MILLILITER(S): at 08:06

## 2017-04-16 RX ADMIN — OXYCODONE HYDROCHLORIDE 30 MILLIGRAM(S): 5 TABLET ORAL at 17:25

## 2017-04-16 RX ADMIN — LIDOCAINE 1 PATCH: 4 CREAM TOPICAL at 22:46

## 2017-04-16 RX ADMIN — MORPHINE SULFATE 30 MILLIGRAM(S): 50 CAPSULE, EXTENDED RELEASE ORAL at 06:03

## 2017-04-16 RX ADMIN — Medication 50 MILLIGRAM(S): at 06:04

## 2017-04-16 RX ADMIN — OXYCODONE HYDROCHLORIDE 30 MILLIGRAM(S): 5 TABLET ORAL at 09:00

## 2017-04-16 RX ADMIN — Medication 3 MILLILITER(S): at 19:48

## 2017-04-16 RX ADMIN — OXYCODONE HYDROCHLORIDE 30 MILLIGRAM(S): 5 TABLET ORAL at 16:07

## 2017-04-16 RX ADMIN — Medication 20 MILLIEQUIVALENT(S): at 11:16

## 2017-04-16 RX ADMIN — CEFTRIAXONE 100 GRAM(S): 500 INJECTION, POWDER, FOR SOLUTION INTRAMUSCULAR; INTRAVENOUS at 13:10

## 2017-04-16 RX ADMIN — Medication 150 MILLIGRAM(S): at 06:05

## 2017-04-16 NOTE — PROGRESS NOTE ADULT - SUBJECTIVE AND OBJECTIVE BOX
Pt is a 61 year female with a PMHx notable for kidney stone right side, and chronic pain.  She presents to the ED with complaints of Rt sided flank pain, fever for the past few days.  Pt states that she was advised by Dr. Cool to come into the ED for further evaluation and treatment due to symptoms and low O2 saturation.  Pt state that she is experiencing sharp pain to her right side, back pain, cough, myalgias that has worsened.    ED course:  O2 saturation 88%.  CT abd/pelvis: Large consolidation with near complete collapse of the right middle and lower lobes.  There is partial atelectasis of the right upper lobe.  There is a small to moderate Rt pl effusion extending along the lateral Rt hemithorax.  Patient was started on IV ABT.     4/14 - pain is better controlled this am with the current pain regimen. no new complaints.   4/15: States she has some rib pain, worse with breathing.  Also states she coughed up some thick white sputum.  Otherwise no fever, chills. Breathing seems to be improving.  4/16: Had febrile episode yesterday 101.3, though per staff may have been inaccurate.  Pt appears well with minimal complaints except for chronic pain and right shoulder pain.  Denies N, V, abd pain, CP, SOB.    ROS:   All 10 systems reviewed and found to be negative with the exception of what has been described above.    Vital Signs Last 24 Hrs  Vital Signs Last 24 Hrs  T(C): 37.1, Max: 38.5 (04-15 @ 22:27)  T(F): 98.8, Max: 101.3 (04-15 @ 22:27)  HR: 110 (84 - 110)  BP: 144/72 (99/79 - 151/97)  BP(mean): --  RR: 18 (18 - 20)  SpO2: 98% (95% - 98%)        PHYSICAL EXAM:    General: Alert, well-developed  Neuro: Alert and oriented to person, place, and time. Able to communicate  well.  Sensation intact in all extremities. Appropriate affect.   HEENT: No JVD, no masses, Eyes anicteric, No carotid bruits. No lymphadenopathy,  Cardiovascular: regular , with normal S1 and S2. No murmurs, rubs  Lungs: clear, decreased breath sounds on the right- no wheezing.  Abdomen: Normoactive bowel sounds. Soft, flat, non-tender, and non-distended.  No hepatosplenomegaly, positive bowel sounds  no s/p tenderness                            12.4   12.6  )-----------( [id number]      ( 16 Apr 2017 07:26 )             37.2     04-16    138  |  99  |  7   ----------------------------<  183<H>  3.5   |  32<H>  |  0.57    Ca    9.4      16 Apr 2017 07:26      CAPILLARY BLOOD GLUCOSE        MEDICATIONS  (STANDING):  ALBUTerol/ipratropium for Nebulization 3milliLiter(s) Nebulizer every 6 hours  lidocaine   Patch 1Patch Transdermal daily  enoxaparin Injectable 40milliGRAM(s) SubCutaneous every 24 hours  morphine ER Tablet 30milliGRAM(s) Oral every 12 hours  gabapentin 300milliGRAM(s) Oral at bedtime  pregabalin 50milliGRAM(s) Oral three times a day  ATENolol  Tablet 25milliGRAM(s) Oral daily  aspirin  chewable 81milliGRAM(s) Oral daily  vancomycin  IVPB 750milliGRAM(s) IV Intermittent every 12 hours  cefTRIAXone   IVPB  IV Intermittent   cefTRIAXone   IVPB 1Gram(s) IV Intermittent every 24 hours    MEDICATIONS  (PRN):  acetaminophen   Tablet 650milliGRAM(s) Oral every 6 hours PRN For Temp greater than 38.5 C (101.3 F)  ondansetron Injectable 4milliGRAM(s) IV Push every 6 hours PRN Nausea  zolpidem 5milliGRAM(s) Oral at bedtime PRN Insomnia  ALBUTerol    0.083% 2.5milliGRAM(s) Nebulizer every 4 hours PRN Shortness of Breath and/or Wheezing  acetaminophen   Tablet. 650milliGRAM(s) Oral every 6 hours PRN Mild Pain (1 - 3)  oxyCODONE IR 30milliGRAM(s) Oral every 4 hours PRN Moderate Pain (4 - 6)  guaiFENesin    Syrup 200milliGRAM(s) Oral every 6 hours PRN Cough      Assessment and Plan:   Assessment:  		  61 year female with a PMHx notable for kidney stone, and chronic back pain who presents to the ED with Rt sided flank pain and fever secondary to pneumonia.    Pt is a 61 year female with a PMHx notable for kidney stone right side who presents to the ED with complaints of Rt sided flank pain, fever for the past few days.  Pt states that she was advised by Dr. Cool to come into the ED for further evaluation and treatment due to symptoms and low O2 saturation.  Pt state that she is experiencing sharp pain to her right side, back pain, cough, mylagias that worsened this AM.    ED course:  O2 saturation 88%.  CT abd/pelvis: Large consolidation with near complete collapse of the right middle and lower lobes.  There is partial atelectasis of the right upper lobe.  There is a small to moderate Rt pl effusion extending along the lateral Rt hemithorax.  Pt was given vancomcyin, aztreonam, and NS IV fluid bolus.    Problem/Plan - 1:  ·   Respiratory failure with hypoxia, unspecified chronicity secondary to multilobar PNA with R parapneumonic effusion- suspect GNR   - leukocytosis --> trending down.  - EKG NSR  - Duonebs ATC and prn  - O2 supplementation.   - encourage IS  - ID consult appreciated- DC aztreonam and continue  Vanco and Rocephin #4  - f/u repeat chest xray.  - sputum culture if possible  - urine legionella antigen- negative      Problem/Plan - 2:  ·  Chronic back pain  - ISTOP checked  - resume Pain medication regimen from home   - continue Oxycodone 30mg every 4 hours, MS Contin 30mg every 12 hours, Gabapentin and Lyrica      Attending Statement:  >35 minutes spent on total encounter; more than 50% of the visit was spent counseling and/or coordinating care by the attending physician.

## 2017-04-17 LAB
CULTURE RESULTS: SIGNIFICANT CHANGE UP
CULTURE RESULTS: SIGNIFICANT CHANGE UP
HCT VFR BLD CALC: 38.2 % — SIGNIFICANT CHANGE UP (ref 34.5–45)
HGB BLD-MCNC: 12.3 G/DL — SIGNIFICANT CHANGE UP (ref 11.5–15.5)
MCHC RBC-ENTMCNC: 29.2 PG — SIGNIFICANT CHANGE UP (ref 27–34)
MCHC RBC-ENTMCNC: 32.2 GM/DL — SIGNIFICANT CHANGE UP (ref 32–36)
MCV RBC AUTO: 90.8 FL — SIGNIFICANT CHANGE UP (ref 80–100)
PLATELET # BLD AUTO: 474 K/UL — HIGH (ref 150–400)
RBC # BLD: 4.21 M/UL — SIGNIFICANT CHANGE UP (ref 3.8–5.2)
RBC # FLD: 11.9 % — SIGNIFICANT CHANGE UP (ref 10.3–14.5)
SPECIMEN SOURCE: SIGNIFICANT CHANGE UP
SPECIMEN SOURCE: SIGNIFICANT CHANGE UP
WBC # BLD: 15.2 K/UL — HIGH (ref 3.8–10.5)
WBC # FLD AUTO: 15.2 K/UL — HIGH (ref 3.8–10.5)

## 2017-04-17 PROCEDURE — 71250 CT THORAX DX C-: CPT | Mod: 26

## 2017-04-17 RX ADMIN — Medication 150 MILLIGRAM(S): at 06:07

## 2017-04-17 RX ADMIN — Medication 50 MILLIGRAM(S): at 06:12

## 2017-04-17 RX ADMIN — OXYCODONE HYDROCHLORIDE 30 MILLIGRAM(S): 5 TABLET ORAL at 07:50

## 2017-04-17 RX ADMIN — Medication 50 MILLIGRAM(S): at 22:59

## 2017-04-17 RX ADMIN — OXYCODONE HYDROCHLORIDE 30 MILLIGRAM(S): 5 TABLET ORAL at 20:24

## 2017-04-17 RX ADMIN — Medication 50 MILLIGRAM(S): at 13:54

## 2017-04-17 RX ADMIN — OXYCODONE HYDROCHLORIDE 30 MILLIGRAM(S): 5 TABLET ORAL at 16:09

## 2017-04-17 RX ADMIN — Medication 3 MILLILITER(S): at 10:14

## 2017-04-17 RX ADMIN — Medication 81 MILLIGRAM(S): at 13:50

## 2017-04-17 RX ADMIN — OXYCODONE HYDROCHLORIDE 30 MILLIGRAM(S): 5 TABLET ORAL at 12:07

## 2017-04-17 RX ADMIN — ATENOLOL 25 MILLIGRAM(S): 25 TABLET ORAL at 06:07

## 2017-04-17 RX ADMIN — GABAPENTIN 300 MILLIGRAM(S): 400 CAPSULE ORAL at 22:59

## 2017-04-17 RX ADMIN — ENOXAPARIN SODIUM 40 MILLIGRAM(S): 100 INJECTION SUBCUTANEOUS at 13:51

## 2017-04-17 RX ADMIN — MORPHINE SULFATE 30 MILLIGRAM(S): 50 CAPSULE, EXTENDED RELEASE ORAL at 06:25

## 2017-04-17 RX ADMIN — Medication 3 MILLILITER(S): at 02:33

## 2017-04-17 RX ADMIN — MORPHINE SULFATE 30 MILLIGRAM(S): 50 CAPSULE, EXTENDED RELEASE ORAL at 06:08

## 2017-04-17 RX ADMIN — Medication 150 MILLIGRAM(S): at 17:29

## 2017-04-17 RX ADMIN — CEFTRIAXONE 100 GRAM(S): 500 INJECTION, POWDER, FOR SOLUTION INTRAMUSCULAR; INTRAVENOUS at 13:54

## 2017-04-17 RX ADMIN — Medication 3 MILLILITER(S): at 20:44

## 2017-04-17 RX ADMIN — LIDOCAINE 1 PATCH: 4 CREAM TOPICAL at 13:49

## 2017-04-17 RX ADMIN — MORPHINE SULFATE 30 MILLIGRAM(S): 50 CAPSULE, EXTENDED RELEASE ORAL at 17:29

## 2017-04-17 NOTE — PROGRESS NOTE ADULT - SUBJECTIVE AND OBJECTIVE BOX
Pt is a 61 year female with a PMHx notable for kidney stone right side, and chronic pain admitted 4/12 with c/o of R flank pain, fevers, cough myalgias, was sent in by urologist for further eval, here afebrile, hypoxic on RA to 88%,  wbc ct 23.9, CT chest with large consolidation with near complete collapse of the right middle and lower lobes, RUL atelectasis, moderate R pleural effusion, pt was given IV vancomycin/aztreonam d/t concern of infection.     feels better today  less cough  no fevers    MEDICATIONS  (STANDING):  ALBUTerol/ipratropium for Nebulization 3milliLiter(s) Nebulizer every 6 hours  lidocaine   Patch 1Patch Transdermal daily  enoxaparin Injectable 40milliGRAM(s) SubCutaneous every 24 hours  morphine ER Tablet 30milliGRAM(s) Oral every 12 hours  gabapentin 300milliGRAM(s) Oral at bedtime  pregabalin 50milliGRAM(s) Oral three times a day  ATENolol  Tablet 25milliGRAM(s) Oral daily  aspirin  chewable 81milliGRAM(s) Oral daily  vancomycin  IVPB 750milliGRAM(s) IV Intermittent every 12 hours  cefTRIAXone   IVPB  IV Intermittent   cefTRIAXone   IVPB 1Gram(s) IV Intermittent every 24 hours      Vital Signs Last 24 Hrs  T(C): 38.4, Max: 38.4 (04-17 @ 12:02)  T(F): 101.2, Max: 101.2 (04-17 @ 12:02)  HR: 93 (88 - 119)  BP: 160/97 (140/73 - 163/89)  BP(mean): --  RR: 16 (16 - 19)  SpO2: 96% (94% - 96%)            PE:  Constitutional: NAD, laying in bed  HEENT: NC/AT, EOMI, PERRLA  Neck: supple  Back: no tenderness  Respiratory: decreased breath sounds, scattered rhonchi  Cardiovascular: S1S2 regular, no murmurs  Abdomen: soft, + tender, not distended, positive BS  Genitourinary: deferred  Rectal: deferred  Musculoskeletal: no muscle tenderness, no joint swelling or tenderness  Extremities: no pedal edema  Neurological:  moving all extremities, no focal deficits  Skin: no rashes    Labs:                                   12.3   15.2  )-----------( 474      ( 17 Apr 2017 05:33 )             38.2     04-16    138  |  99  |  7   ----------------------------<  183<H>  3.5   |  32<H>  |  0.57    Ca    9.4      16 Apr 2017 07:26             Cultures:     Culture - Blood (04.12.17 @ 15:35)    Specimen Source: .Blood None    Culture Results:   No growth to date.            Radiology:   EXAM:  CT CHEST                            PROCEDURE DATE:  04/17/2017        INTERPRETATION:  CT CHEST    HISTORY:  Fever                     TECHNIQUE: Noncontrast CT of the chest was performed. Coronal and   sagittal images were reconstructed. This study was performed using   automatic exposure control (radiation dose reduction software) to obtain   a diagnostic image quality scan with patient dose as low as reasonably   achievable.    COMPARISON: Chest x-ray 4/15/2017, chest CT 4/12/2017    FINDINGS:    LUNGS, AIRWAYS: The central airways are patent. Stable subtotal   atelectasis of the right lower and right middle lobes with loss of   volume. New patchy groundglass opacities throughout both lungs, likely   infectious.    PLEURA: Interval decrease in size of partially loculated, small right   pleural effusion. No left effusion.    HEART AND VESSELS: Normal heart size. No pericardial effusion. Normal   caliber thoracic aorta. Coronary calcifications are present.    MEDIASTINUM AND ILEANA: Stable mild mediastinal lymphadenopathy with the   largest lymph nodes in the subcarinal and paraesophageal region measuring   up to 1.5 cm in short axis.    UPPER ABDOMEN: Diffuse steatosis. Stable mild upper abdominal   lymphadenopathy.    BONESAND SOFT TISSUES: Stable eventration of the right hemidiaphragm. No   acute bony abnormality.    IMPRESSION:     New patchy groundglass opacities throughout both lungs, likely infectious.    Decreased small loculated right pleural effusion. Stable chronic subtotal   right lower lung atelectasis with right hemidiaphragm eventration.    Stable mild mediastinal and upper abdominal lymphadenopathy.      EXAM:  CT ABDOMEN AND PELVIS                            PROCEDURE DATE:  04/12/2017        INTERPRETATION:  CT OF THE CHEST, ABDOMEN AND PELVIS WITHOUT IV CONTRAST     CLINICAL INFORMATION:  hypoxia and right flank pain    TECHNIQUE: CT scan of thechest, abdomen, and pelvis was performed from   the thoracic inlet through the symphysis pubis without intravenous or   oral contrast.  Sagittal and coronal reformatted images provided.    COMPARISON: None.    FINDINGS:  Evaluation of the solid organs and vasculature limited in the absence of   intravenous contrast.    CHEST:    LUNGS, AIRWAYS, PLEURA: There are large consolidations with near complete   collapse of the right middle and lower lobes. There is partial   atelectasis of the right upper lobe. There is a small to moderate right   pleural effusion extending along the lateral right hemithorax. There is   eventration of the right hemidiaphragm and liver into the mid right   hemithorax. There are a few very small foci of groundglass opacity in the   left upper lobe, may reflect additional foci of infection. There is   fibrotic change at the left lung base.    VESSELS: Atherosclerotic changes..  HEART: Normal size. No pericardial effusion.  MEDIASTINUM AND ILEANA: Within normal limits.  CHEST WALL, AXILLA, LOWER NECK: Within normal limits.    ABDOMEN/PELVIS:    LIVER: No focal hepatic lesion. There is fatty infiltration of the liver.   There is eventration of the right hemidiaphragm and liver into the mid   right hemithorax.  GALLBLADDER: Unremarkable.  BILIARY TREE: Unremarkable.  PANCREAS: Unremarkable.  SPLEEN: Unremarkable.  ADRENALS: Unremarkable.  KIDNEYS/URETERS: Nonobstructive 5 x 1 mm calculus in the lower pole of   the right kidney. Small simple cyst in the lower poleof the right   kidney. No left renal calculi. No hydronephrosis bilaterally..    BOWEL: Partial sigmoid resection and anastomosis is present.  No bowel   obstruction or inflammatory process.    PERITONEUM/RETROPERITONEUM:  No ascites, free air or significant   lymphadenopathy.    BLADDER: Unremarkable.  REPRODUCTIVE ORGANS: Supracervical hysterectomy is suspected..    VASCULATURE: Within normal limits.  ABDOMINAL WALL:  Unremarkable    OSSEOUS STRUCTURES:  Multilevel degenerative changes of the spine are identified. The osseous   structures are intact without evidence of fracture or dislocation.    IMPRESSION:       Large consolidations with near complete collapse of the right middle and   lower lobes. There is partial atelectasis of the right upper lobe. There   is a small to moderate right pleural effusion extending along the lateral   right hemithorax. There is eventration of the right hemidiaphragm and   liver into the mid right hemithorax. Few very small foci of groundglass   opacityin the left upper lobe, may reflect additional foci of infection.     Rest of the chronic findings as above.        Advanced directives addressed: full resuscitation

## 2017-04-17 NOTE — PROGRESS NOTE ADULT - SUBJECTIVE AND OBJECTIVE BOX
Pt is a 61 year female with a PMHx notable for kidney stone right side, and chronic pain.  She presents to the ED with complaints of Rt sided flank pain, fever for the past few days.  Pt states that she was advised by Dr. Cool to come into the ED for further evaluation and treatment due to symptoms and low O2 saturation.  Pt state that she is experiencing sharp pain to her right side, back pain, cough, myalgias that has worsened.    ED course:  O2 saturation 88%.  CT abd/pelvis: Large consolidation with near complete collapse of the right middle and lower lobes.  There is partial atelectasis of the right upper lobe.  There is a small to moderate Rt pl effusion extending along the lateral Rt hemithorax.  Patient was started on IV ABT.     4/14 - pain is better controlled this am with the current pain regimen. no new complaints.   4/15: States she has some rib pain, worse with breathing.  Also states she coughed up some thick white sputum.  Otherwise no fever, chills. Breathing seems to be improving.  4/16: Had febrile episode on 4/15 of 101.3, though per staff may have been inaccurate.  Pt appears well with minimal complaints except for chronic back pain and right shoulder pain.  Denies N, V, abd pain, CP, SOB.  Pt is having low grade temps.    ROS:   All 10 systems reviewed and found to be negative with the exception of what has been described above.    Vital Signs Last 24 Hrs  Vital Signs Last 24 Hrs  T(C): 37.3, Max: 37.8 (04-16 @ 22:05)  T(F): 99.2, Max: 100 (04-16 @ 22:05)  HR: 88 (88 - 119)  BP: 140/73 (140/73 - 163/89)  BP(mean): --  RR: 18 (18 - 19)  SpO2: 94% (94% - 98%)        PHYSICAL EXAM:    General: Alert, well-developed  Neuro: Alert and oriented to person, place, and time. Able to communicate  well.  Sensation intact in all extremities. Appropriate affect.   HEENT: No JVD, no masses, Eyes anicteric, No carotid bruits. No lymphadenopathy,  Cardiovascular: regular , with normal S1 and S2. No murmurs, rubs  Lungs: clear, decreased breath sounds on the right- no wheezing.  Abdomen: Normoactive bowel sounds. Soft, flat, non-tender, and non-distended.  No hepatosplenomegaly, positive bowel sounds  no s/p tenderness                                      12.3   15.2  )-----------( 474      ( 17 Apr 2017 05:33 )             38.2     04-16    138  |  99  |  7   ----------------------------<  183<H>  3.5   |  32<H>  |  0.57    Ca    9.4      16 Apr 2017 07:26      CAPILLARY BLOOD GLUCOSE        MEDICATIONS  (STANDING):  ALBUTerol/ipratropium for Nebulization 3milliLiter(s) Nebulizer every 6 hours  lidocaine   Patch 1Patch Transdermal daily  enoxaparin Injectable 40milliGRAM(s) SubCutaneous every 24 hours  morphine ER Tablet 30milliGRAM(s) Oral every 12 hours  gabapentin 300milliGRAM(s) Oral at bedtime  pregabalin 50milliGRAM(s) Oral three times a day  ATENolol  Tablet 25milliGRAM(s) Oral daily  aspirin  chewable 81milliGRAM(s) Oral daily  vancomycin  IVPB 750milliGRAM(s) IV Intermittent every 12 hours  cefTRIAXone   IVPB  IV Intermittent   cefTRIAXone   IVPB 1Gram(s) IV Intermittent every 24 hours    MEDICATIONS  (PRN):  acetaminophen   Tablet 650milliGRAM(s) Oral every 6 hours PRN For Temp greater than 38.5 C (101.3 F)  ondansetron Injectable 4milliGRAM(s) IV Push every 6 hours PRN Nausea  zolpidem 5milliGRAM(s) Oral at bedtime PRN Insomnia  ALBUTerol    0.083% 2.5milliGRAM(s) Nebulizer every 4 hours PRN Shortness of Breath and/or Wheezing  acetaminophen   Tablet. 650milliGRAM(s) Oral every 6 hours PRN Mild Pain (1 - 3)  oxyCODONE IR 30milliGRAM(s) Oral every 4 hours PRN Moderate Pain (4 - 6)  guaiFENesin    Syrup 200milliGRAM(s) Oral every 6 hours PRN Cough        Assessment and Plan:   Assessment:  		  61 year female with a PMHx notable for kidney stone, and chronic back pain who presents to the ED with Rt sided flank pain and fever secondary to pneumonia.    Pt is a 61 year female with a PMHx notable for kidney stone right side who presents to the ED with complaints of Rt sided flank pain, fever for the past few days.  Pt states that she was advised by Dr. Cool to come into the ED for further evaluation and treatment due to symptoms and low O2 saturation.  Pt state that she is experiencing sharp pain to her right side, back pain, cough, mylagias that worsened this AM.    ED course:  O2 saturation 88%.  CT abd/pelvis: Large consolidation with near complete collapse of the right middle and lower lobes.  There is partial atelectasis of the right upper lobe.  There is a small to moderate Rt pl effusion extending along the lateral Rt hemithorax.  Pt was given vancomcyin, aztreonam, and NS IV fluid bolus.    Problem/Plan - 1:  ·   Respiratory failure with hypoxia, unspecified chronicity secondary to multilobar PNA with R parapneumonic effusion- suspect GNR   - leukocytosis --> trending down but still elevated.  - EKG NSR  - Duonebs ATC and prn  - encourage incentive spirometry  - ID consult appreciated- DC aztreonam and continue  Vanco and Rocephin #5  - repeat xray 4/15 with persistent effusion.    - Repeat Chest CT today  - Pulm consult with Dr. Bailey for possible thoracocentesis  - sputum culture if possible  - urine legionella antigen- negative      Problem/Plan - 2:  ·  Chronic back pain  - ISTOP checked  - resume Pain medication regimen from home   - continue Oxycodone 30mg every 4 hours, MS Contin 30mg every 12 hours, Gabapentin and Lyrica    dispo:  -IV Abx  -CT chest  -Pulm consult for possible thoracocentesis      Attending Statement:  >35 minutes spent on total encounter; more than 50% of the visit was spent counseling and/or coordinating care by the attending physician.

## 2017-04-18 VITALS — OXYGEN SATURATION: 91 %

## 2017-04-18 LAB
HCT VFR BLD CALC: 36.3 % — SIGNIFICANT CHANGE UP (ref 34.5–45)
HGB BLD-MCNC: 12 G/DL — SIGNIFICANT CHANGE UP (ref 11.5–15.5)
MCHC RBC-ENTMCNC: 29.6 PG — SIGNIFICANT CHANGE UP (ref 27–34)
MCHC RBC-ENTMCNC: 32.9 GM/DL — SIGNIFICANT CHANGE UP (ref 32–36)
MCV RBC AUTO: 90.1 FL — SIGNIFICANT CHANGE UP (ref 80–100)
PLATELET # BLD AUTO: 448 K/UL — HIGH (ref 150–400)
RBC # BLD: 4.03 M/UL — SIGNIFICANT CHANGE UP (ref 3.8–5.2)
RBC # FLD: 11.7 % — SIGNIFICANT CHANGE UP (ref 10.3–14.5)
WBC # BLD: 13.3 K/UL — HIGH (ref 3.8–10.5)
WBC # FLD AUTO: 13.3 K/UL — HIGH (ref 3.8–10.5)

## 2017-04-18 RX ORDER — IPRATROPIUM/ALBUTEROL SULFATE 18-103MCG
1 AEROSOL WITH ADAPTER (GRAM) INHALATION
Qty: 1 | Refills: 0 | OUTPATIENT
Start: 2017-04-18 | End: 2017-05-18

## 2017-04-18 RX ORDER — CEFOXITIN 1 G/1
1 INJECTION, POWDER, FOR SOLUTION INTRAVENOUS
Qty: 14 | Refills: 0 | OUTPATIENT
Start: 2017-04-18 | End: 2017-04-25

## 2017-04-18 RX ADMIN — Medication 50 MILLIGRAM(S): at 14:51

## 2017-04-18 RX ADMIN — OXYCODONE HYDROCHLORIDE 30 MILLIGRAM(S): 5 TABLET ORAL at 11:54

## 2017-04-18 RX ADMIN — OXYCODONE HYDROCHLORIDE 30 MILLIGRAM(S): 5 TABLET ORAL at 02:57

## 2017-04-18 RX ADMIN — OXYCODONE HYDROCHLORIDE 30 MILLIGRAM(S): 5 TABLET ORAL at 08:53

## 2017-04-18 RX ADMIN — LIDOCAINE 1 PATCH: 4 CREAM TOPICAL at 01:51

## 2017-04-18 RX ADMIN — Medication 3 MILLILITER(S): at 07:39

## 2017-04-18 RX ADMIN — LIDOCAINE 1 PATCH: 4 CREAM TOPICAL at 11:03

## 2017-04-18 RX ADMIN — Medication 81 MILLIGRAM(S): at 11:02

## 2017-04-18 RX ADMIN — Medication 150 MILLIGRAM(S): at 05:23

## 2017-04-18 RX ADMIN — OXYCODONE HYDROCHLORIDE 30 MILLIGRAM(S): 5 TABLET ORAL at 07:53

## 2017-04-18 RX ADMIN — ENOXAPARIN SODIUM 40 MILLIGRAM(S): 100 INJECTION SUBCUTANEOUS at 11:03

## 2017-04-18 RX ADMIN — MORPHINE SULFATE 30 MILLIGRAM(S): 50 CAPSULE, EXTENDED RELEASE ORAL at 05:19

## 2017-04-18 RX ADMIN — Medication 3 MILLILITER(S): at 02:35

## 2017-04-18 RX ADMIN — ATENOLOL 25 MILLIGRAM(S): 25 TABLET ORAL at 05:19

## 2017-04-18 RX ADMIN — Medication 50 MILLIGRAM(S): at 05:19

## 2017-04-18 NOTE — DISCHARGE NOTE ADULT - CARE PLAN
Principal Discharge DX:	Lobar pneumonia  Goal:	prevetn recurrence  Instructions for follow-up, activity and diet:	please complete antibiotic therapy - do not miss of skip doses  f/uwith Dr Meadows in 1-2 weeks  Notify your PCP if your symptoms does not improve or if you develop SOB or chest pain  Notify your PCP if you develop diarrhea, fever or chils  f/u with Dr Bailey  continue inhalers as needed.  Secondary Diagnosis:	Parapneumonic effusion  Goal:	prevent worsenig  Instructions for follow-up, activity and diet:	f/u with Dr Bailey as an outpatient in 10-14 days  Secondary Diagnosis:	Chronic back pain, unspecified back location, unspecified back pain laterality  Goal:	symptom free  Instructions for follow-up, activity and diet:	continue pain regimen

## 2017-04-18 NOTE — DISCHARGE NOTE ADULT - HOSPITAL COURSE
61 year female with a PMHx notable for kidney stone right side, and chronic pain.  She presents to the ED with complaints of Rt sided flank pain, fever for the past few days.  Pt states that she was advised by Dr. Cool to come into the ED for further evaluation and treatment due to symptoms and low O2 saturation.  CT abd/pelvis: Large consolidation with near complete collapse of the right middle and lower lobes.  There is partial atelectasis of the right upper lobe.  There is a small to moderate Rt pl effusion extending along the lateral Rt hemithorax.  Patient was started on IV ABT.  4/18- feels better this AM- plan for discharge home.     ROS:   All 10 systems reviewed and found to be negative with the exception of what has been described above.  Vital Signs Last 24 Hrs  T(C): 36.8, Max: 36.9 (04-17 @ 21:05)  T(F): 98.3, Max: 98.4 (04-17 @ 21:05)  HR: 81 (77 - 101)  BP: 135/67 (131/65 - 135/67)  BP(mean): --  RR: 16 (16 - 18)  SpO2: 91% (91% - 96%)    PHYSICAL EXAM:  General: Alert, well-developed  Neuro: Alert and oriented to person, place, and time. Able to communicate  well.  Sensation intact in all extremities. Appropriate affect.   HEENT: No JVD, no masses, Eyes anicteric, No carotid bruits. No lymphadenopathy,  Cardiovascular: regular , with normal S1 and S2. No murmurs, rubs  Lungs: clear, decreased breath sounds on the right- no wheezing.  Abdomen: Normoactive bowel sounds. Soft, flat, non-tender, and non-distended.  No hepatosplenomegaly, positive bowel sounds  no s/p tenderness    Labs: reviewed          Meds reviewed    Assessment and Plan:   Assessment:  		  61 year female with a PMHx notable for kidney stone, and chronic back pain who presents to the ED with Rt sided flank pain and fever secondary to pneumonia.    Pt is a 61 year female with a PMHx notable for kidney stone right side who presents to the ED with complaints of Rt sided flank pain, fever for the past few days.  Pt states that she was advised by Dr. Cool to come into the ED for further evaluation and treatment due to symptoms and low O2 saturation.  Pt state that she is experiencing sharp pain to her right side, back pain, cough, mylagias that worsened this AM.    ED course:  O2 saturation 88%.  CT abd/pelvis: Large consolidation with near complete collapse of the right middle and lower lobes.  There is partial atelectasis of the right upper lobe.  There is a small to moderate Rt pl effusion extending along the lateral Rt hemithorax.  Pt was given vancomcyin, aztreonam, and NS IV fluid bolus.    Problem/Plan - 1:  ·   Respiratory failure with hypoxia, unspecified chronicity secondary to multilobar PNA with R parapneumonic effusion- suspect GNR   - leukocytosis -trending down  - encourage incentive spirometry  - ID consult appreciated- Completed  Vanco and Rocephin- will change to po ceftin for discharge  - Pulmonary consult with Dr Bailey will need to f/u as an outpatient in 10-14 days      Problem/Plan - 2:  ·  Chronic back pain  - continue Oxycodone 30mg every 4 hours, MS Contin 30mg every 12 hours, Gabapentin and Lyrica    Plan for discharge home today. Case discussed with Dr Sánchez and team on IDR.

## 2017-04-18 NOTE — PROGRESS NOTE ADULT - SUBJECTIVE AND OBJECTIVE BOX
Patient is clinically feeling with pain . no reported fevers in the last 24 hours . no cough or wheeze and still has back discomfort       Vital Signs Last 24 Hrs  T(C): 36.8, Max: 38.4 (04-17 @ 12:02)  T(F): 98.3, Max: 101.2 (04-17 @ 12:02)  HR: 81 (77 - 101)  BP: 135/67 (131/65 - 160/97)  BP(mean): --  RR: 16 (16 - 18)  SpO2: 91% (91% - 96%)    PHYSICAL EXAMINATION:    GENERAL: The patient comfortable with no apparent distress.     HEENT: Head is normocephalic and atraumatic.      NECK: Supple with no elevated JVP.    LUNGS: Bilateral air entry with few rales and decreased breath sounds in the right base has no wheeze     HEART: S1 and S2 present without murmur.    ABDOMEN: Soft, nontender, and nondistended. No hepatosplenomegaly is noted.    EXTREMITIES: No edema or calf tenderness.    NEUROLOGIC: Grossly intact.    MEDICATIONS  (STANDING):  ALBUTerol/ipratropium for Nebulization 3milliLiter(s) Nebulizer every 6 hours  lidocaine   Patch 1Patch Transdermal daily  enoxaparin Injectable 40milliGRAM(s) SubCutaneous every 24 hours  morphine ER Tablet 30milliGRAM(s) Oral every 12 hours  gabapentin 300milliGRAM(s) Oral at bedtime  pregabalin 50milliGRAM(s) Oral three times a day  ATENolol  Tablet 25milliGRAM(s) Oral daily  aspirin  chewable 81milliGRAM(s) Oral daily  vancomycin  IVPB 750milliGRAM(s) IV Intermittent every 12 hours  cefTRIAXone   IVPB  IV Intermittent   cefTRIAXone   IVPB 1Gram(s) IV Intermittent every 24 hours      LABS:                        12.0   13.3  )-----------( 448      ( 18 Apr 2017 06:28 )             36.3                         No growth at 5 days.    .Blood None

## 2017-04-18 NOTE — DISCHARGE NOTE ADULT - PATIENT PORTAL LINK FT
“You can access the [employer] Patient Portal, offered by [facility], by registering with the following website: [email or web address]”

## 2017-04-18 NOTE — PROGRESS NOTE ADULT - ASSESSMENT
multifocal pneumonia with right middle lobe and lower lobe consolidation with parapneumonic effusion and effusion is decreasing insize when compared to admission ct scan she also has focal ground glass infiltrates in the left lung as well.   - eventration of right hemidiaphragm with low lung volumes on the left side   - chronic pain syndrome on opoid therapy .  PLAN   - effusion is small and loculated and has been decreasing in size . no evidence of mass lesion or endobronchial lesion indicative of malignancy  - change of iv antibiotics to po for completion of 10 to 14 days after follow up . out pt sleep studies . incentive spirometry
Pt is a 61 year female with a PMHx notable for kidney stone right side, and chronic pain admitted 4/12 with c/o of R flank pain, fevers, cough myalgias, was sent in by urologist for further eval, here febrile to 100.9, hypoxic on RA to 88%,  wbc ct 23.9, CT chest with large consolidation with near complete collapse of the right middle and lower lobes, RUL atelectasis, moderate R pleural effusion, pt was given IV vancomycin/aztreonam d/t concern of infection.    1. sepsis/hypoxic resp failure/multilobar PNA/R parapneumonic effusion/PCN allergy    - improving    - reports vomiting with augmentin but no rash or anaphylaxis    - no recent hospitalizations in past 3 months    - continue with IV rocephin [id number] and continue with IV vancomcyin [id number], check trough prior to 4th dose day#2    - check sputum cultures    - legionella Ag (-), blood cx no growth      - supportive care    - monitor temps    -tolerating abx well so far; no side effects noted    -reason for abx use and side effects reviewed with patient; monitor BMP and vancomycin trough levels    2. other issues - kidney stone right side, and chronic pain - care per medicine
Pt is a 61 year female with a PMHx notable for kidney stone right side, and chronic pain admitted 4/12 with c/o of R flank pain, fevers, cough myalgias, was sent in by urologist for further eval, here febrile to 100.9, hypoxic on RA to 88%,  wbc ct 23.9, CT chest with large consolidation with near complete collapse of the right middle and lower lobes, RUL atelectasis, moderate R pleural effusion, pt was given IV vancomycin/aztreonam d/t concern of infection.    1. sepsis/hypoxic resp failure/multilobar PNA/R parapneumonic effusion/PCN allergy    - low grade temps but overall much improved    - repeat CT shows improvement in previous findings and decreased R loculated effusion likely reactive to pna    - reports vomiting with augmentin but no rash or anaphylaxis    - no recent hospitalizations in past 3 months    - on day 5 of IV vancomycin/rocephin    - recommend switch tomorrow to ceftin 500mg BID for 7 days to complete total 10-14 day course    - legionella Ag (-), blood cx no growth      - supportive care    - monitor temps    -tolerating abx well so far; no side effects noted    -reason for abx use and side effects reviewed with patient; monitor BMP and vancomycin trough levels    2. other issues - kidney stone right side, and chronic pain - care per medicine
multifocal pneumonia with right middle lobe and lower lobe consolidation with parapneumonic effusion and effusion is decreasing insize when compared to admission ct scan she also has focal ground glass infiltrates in the left lung as well.   - eventration of right hemidiaphragm with low lung volumes on the left side   - chronic pain syndrome on opoid therapy .  - HTN     PLAN   - NO fever in the last 24 hours . change of antibiotics to po for another 7 days after I.D follow up and need to have follow up cxr in 10 days for the follow up of parapneumonic effusion.  - check sat on room air and with ambulation before discharge .  - patient was recommended to follow up with me in 10 days   - incentive spirometry

## 2017-04-18 NOTE — DISCHARGE NOTE ADULT - MEDICATION SUMMARY - MEDICATIONS TO TAKE
I will START or STAY ON the medications listed below when I get home from the hospital:    MS Contin 30 mg oral tablet, extended release  -- 1 tab(s) by mouth 12 times a day  -- as per I-STOP  -- Indication: For Chronic back pain, unspecified back location, unspecified back pain laterality    oxyCODONE 30 mg oral tablet  -- 1 tab(s) by mouth every 6 hours, As Needed for pain  -- as per I-STOP  -- Indication: For Chronic back pain, unspecified back location, unspecified back pain laterality    aspirin 81 mg oral tablet  -- 1 tab(s) by mouth once a day  -- Indication: For General    gabapentin 300 mg oral tablet  -- 1 tab(s) by mouth once a day (at bedtime)  -- Indication: For Pain    Lyrica 50 mg oral capsule  -- 1 cap(s) by mouth 3 times a day  -- as per I-STOP  -- Indication: For Pain    atenolol 25 mg oral tablet  -- 1 tab(s) by mouth once a day  -- Indication: For HN    albuterol-ipratropium CFC free 100 mcg-20 mcg/inh inhalation aerosol  -- 1 puff(s) inhaled 4 times a day, As Needed -for bronchospasm  -- Check with your doctor before becoming pregnant.  For inhalation only.  May cause drowsiness or dizziness.  Obtain medical advice before taking any non-prescription drugs as some may affect the action of this medication.  This drug may impair the ability to drive or operate machinery.  Use care until you become familiar with its effects.    -- Indication: For PNEUMONIA, PARAPNEUMONIC EFFUSION    Ceftin 500 mg oral tablet  -- 1 tab(s) by mouth 2 times a day  -- Finish all this medication unless otherwise directed by prescriber.  Medication should be taken with plenty of water.  Take with food or milk.    -- Indication: For Lobar pneumonia    guaiFENesin 100 mg/5 mL oral liquid  -- 10 milliliter(s) by mouth every 6 hours, As needed, Cough  -- Indication: For Lobar pneumonia

## 2017-04-18 NOTE — PROGRESS NOTE ADULT - PROVIDER SPECIALTY LIST ADULT
Hospitalist
Infectious Disease
Infectious Disease
Pulmonology
Hospitalist
Pulmonology

## 2017-04-18 NOTE — DISCHARGE NOTE ADULT - SECONDARY DIAGNOSIS.
Parapneumonic effusion Chronic back pain, unspecified back location, unspecified back pain laterality

## 2017-04-18 NOTE — DISCHARGE NOTE ADULT - CARE PROVIDERS DIRECT ADDRESSES
,DirectAddress_Unknown,[email or web address],DirectAddress_Unknown

## 2017-04-18 NOTE — DISCHARGE NOTE ADULT - NS AS ACTIVITY OBS
No Heavy lifting/straining/Walking-Indoors allowed/Bathing allowed/Walking-Outdoors allowed/Showering allowed

## 2017-04-18 NOTE — DISCHARGE NOTE ADULT - PLAN OF CARE
prevetn recurrence please complete antibiotic therapy - do not miss of skip doses  f/uwith Dr Meadows in 1-2 weeks  Notify your PCP if your symptoms does not improve or if you develop SOB or chest pain  Notify your PCP if you develop diarrhea, fever or chils  f/u with Dr Bailey  continue inhalers as needed. prevent worsenig f/u with Dr Bailey as an outpatient in 10-14 days symptom free continue pain regimen

## 2017-04-18 NOTE — DISCHARGE NOTE ADULT - CARE PROVIDER_API CALL
Karishma Bailey), Critical Care Medicine; Internal Medicine; Pulmonary Disease; Sleep Medicine  [address] [place] [address]  Phone: [phone]  Fax: [phone]    Arlene Meadows), Internal Medicine  [address] [place] [address]  Phone: [phone]  Fax: [phone]

## 2017-04-20 DIAGNOSIS — R10.9 UNSPECIFIED ABDOMINAL PAIN: ICD-10-CM

## 2017-04-20 DIAGNOSIS — J98.11 ATELECTASIS: ICD-10-CM

## 2017-04-20 DIAGNOSIS — G89.4 CHRONIC PAIN SYNDROME: ICD-10-CM

## 2017-04-20 DIAGNOSIS — E78.5 HYPERLIPIDEMIA, UNSPECIFIED: ICD-10-CM

## 2017-04-20 DIAGNOSIS — M54.9 DORSALGIA, UNSPECIFIED: ICD-10-CM

## 2017-04-20 DIAGNOSIS — J15.6 PNEUMONIA DUE TO OTHER GRAM-NEGATIVE BACTERIA: ICD-10-CM

## 2017-04-20 DIAGNOSIS — I10 ESSENTIAL (PRIMARY) HYPERTENSION: ICD-10-CM

## 2017-04-20 DIAGNOSIS — Z87.442 PERSONAL HISTORY OF URINARY CALCULI: ICD-10-CM

## 2017-04-20 DIAGNOSIS — Z88.1 ALLERGY STATUS TO OTHER ANTIBIOTIC AGENTS STATUS: ICD-10-CM

## 2017-04-20 DIAGNOSIS — J96.01 ACUTE RESPIRATORY FAILURE WITH HYPOXIA: ICD-10-CM

## 2017-05-09 ENCOUNTER — OUTPATIENT (OUTPATIENT)
Dept: OUTPATIENT SERVICES | Facility: HOSPITAL | Age: 62
LOS: 1 days | Discharge: HOME | End: 2017-05-09
Payer: MEDICARE

## 2017-05-09 DIAGNOSIS — G56.03 CARPAL TUNNEL SYNDROME, BILATERAL UPPER LIMBS: Chronic | ICD-10-CM

## 2017-05-09 DIAGNOSIS — Z90.49 ACQUIRED ABSENCE OF OTHER SPECIFIED PARTS OF DIGESTIVE TRACT: Chronic | ICD-10-CM

## 2017-05-09 DIAGNOSIS — J18.9 PNEUMONIA, UNSPECIFIED ORGANISM: ICD-10-CM

## 2017-05-09 DIAGNOSIS — Z98.890 OTHER SPECIFIED POSTPROCEDURAL STATES: Chronic | ICD-10-CM

## 2017-05-09 PROCEDURE — 71250 CT THORAX DX C-: CPT | Mod: 26

## 2017-07-19 ENCOUNTER — OUTPATIENT (OUTPATIENT)
Dept: OUTPATIENT SERVICES | Facility: HOSPITAL | Age: 62
LOS: 1 days | Discharge: HOME | End: 2017-07-19
Payer: MEDICARE

## 2017-07-19 VITALS
HEIGHT: 62 IN | WEIGHT: 179.02 LBS | HEART RATE: 82 BPM | RESPIRATION RATE: 18 BRPM | OXYGEN SATURATION: 98 % | DIASTOLIC BLOOD PRESSURE: 82 MMHG | TEMPERATURE: 98 F | SYSTOLIC BLOOD PRESSURE: 138 MMHG

## 2017-07-19 DIAGNOSIS — E78.5 HYPERLIPIDEMIA, UNSPECIFIED: ICD-10-CM

## 2017-07-19 DIAGNOSIS — M48.00 SPINAL STENOSIS, SITE UNSPECIFIED: ICD-10-CM

## 2017-07-19 DIAGNOSIS — M17.12 UNILATERAL PRIMARY OSTEOARTHRITIS, LEFT KNEE: ICD-10-CM

## 2017-07-19 DIAGNOSIS — Z88.0 ALLERGY STATUS TO PENICILLIN: ICD-10-CM

## 2017-07-19 DIAGNOSIS — I10 ESSENTIAL (PRIMARY) HYPERTENSION: ICD-10-CM

## 2017-07-19 DIAGNOSIS — K21.9 GASTRO-ESOPHAGEAL REFLUX DISEASE WITHOUT ESOPHAGITIS: ICD-10-CM

## 2017-07-19 DIAGNOSIS — Z98.890 OTHER SPECIFIED POSTPROCEDURAL STATES: Chronic | ICD-10-CM

## 2017-07-19 DIAGNOSIS — Z90.49 ACQUIRED ABSENCE OF OTHER SPECIFIED PARTS OF DIGESTIVE TRACT: Chronic | ICD-10-CM

## 2017-07-19 DIAGNOSIS — Z87.891 PERSONAL HISTORY OF NICOTINE DEPENDENCE: ICD-10-CM

## 2017-07-19 DIAGNOSIS — Z01.818 ENCOUNTER FOR OTHER PREPROCEDURAL EXAMINATION: ICD-10-CM

## 2017-07-19 DIAGNOSIS — G56.03 CARPAL TUNNEL SYNDROME, BILATERAL UPPER LIMBS: Chronic | ICD-10-CM

## 2017-07-19 LAB
ANION GAP SERPL CALC-SCNC: 4 MMOL/L — LOW (ref 5–17)
APPEARANCE UR: CLEAR — SIGNIFICANT CHANGE UP
BACTERIA # UR AUTO: (no result)
BASOPHILS # BLD AUTO: 0.1 K/UL — SIGNIFICANT CHANGE UP (ref 0–0.2)
BASOPHILS NFR BLD AUTO: 1.2 % — SIGNIFICANT CHANGE UP (ref 0–2)
BILIRUB UR-MCNC: NEGATIVE — SIGNIFICANT CHANGE UP
BLD GP AB SCN SERPL QL: SIGNIFICANT CHANGE UP
BUN SERPL-MCNC: 11 MG/DL — SIGNIFICANT CHANGE UP (ref 7–23)
CALCIUM SERPL-MCNC: 9.6 MG/DL — SIGNIFICANT CHANGE UP (ref 8.5–10.1)
CHLORIDE SERPL-SCNC: 104 MMOL/L — SIGNIFICANT CHANGE UP (ref 96–108)
CO2 SERPL-SCNC: 29 MMOL/L — SIGNIFICANT CHANGE UP (ref 22–31)
COLOR SPEC: YELLOW — SIGNIFICANT CHANGE UP
CREAT SERPL-MCNC: 0.69 MG/DL — SIGNIFICANT CHANGE UP (ref 0.5–1.3)
DIFF PNL FLD: NEGATIVE — SIGNIFICANT CHANGE UP
EOSINOPHIL # BLD AUTO: 0.4 K/UL — SIGNIFICANT CHANGE UP (ref 0–0.5)
EOSINOPHIL NFR BLD AUTO: 4.1 % — SIGNIFICANT CHANGE UP (ref 0–6)
EPI CELLS # UR: SIGNIFICANT CHANGE UP
GLUCOSE SERPL-MCNC: 106 MG/DL — HIGH (ref 70–99)
GLUCOSE UR QL: NEGATIVE MG/DL — SIGNIFICANT CHANGE UP
HCT VFR BLD CALC: 38.1 % — SIGNIFICANT CHANGE UP (ref 34.5–45)
HGB BLD-MCNC: 13 G/DL — SIGNIFICANT CHANGE UP (ref 11.5–15.5)
KETONES UR-MCNC: NEGATIVE — SIGNIFICANT CHANGE UP
LEUKOCYTE ESTERASE UR-ACNC: (no result)
LYMPHOCYTES # BLD AUTO: 3.3 K/UL — SIGNIFICANT CHANGE UP (ref 1–3.3)
LYMPHOCYTES # BLD AUTO: 34.4 % — SIGNIFICANT CHANGE UP (ref 13–44)
MCHC RBC-ENTMCNC: 29.6 PG — SIGNIFICANT CHANGE UP (ref 27–34)
MCHC RBC-ENTMCNC: 34.1 GM/DL — SIGNIFICANT CHANGE UP (ref 32–36)
MCV RBC AUTO: 86.8 FL — SIGNIFICANT CHANGE UP (ref 80–100)
MONOCYTES # BLD AUTO: 0.8 K/UL — SIGNIFICANT CHANGE UP (ref 0–0.9)
MONOCYTES NFR BLD AUTO: 8.1 % — SIGNIFICANT CHANGE UP (ref 2–14)
MRSA PCR RESULT.: SIGNIFICANT CHANGE UP
NEUTROPHILS # BLD AUTO: 5 K/UL — SIGNIFICANT CHANGE UP (ref 1.8–7.4)
NEUTROPHILS NFR BLD AUTO: 52.2 % — SIGNIFICANT CHANGE UP (ref 43–77)
NITRITE UR-MCNC: NEGATIVE — SIGNIFICANT CHANGE UP
PH UR: 5 — SIGNIFICANT CHANGE UP (ref 5–8)
PLATELET # BLD AUTO: 313 K/UL — SIGNIFICANT CHANGE UP (ref 150–400)
POTASSIUM SERPL-MCNC: 3.9 MMOL/L — SIGNIFICANT CHANGE UP (ref 3.5–5.3)
POTASSIUM SERPL-SCNC: 3.9 MMOL/L — SIGNIFICANT CHANGE UP (ref 3.5–5.3)
PROT UR-MCNC: NEGATIVE MG/DL — SIGNIFICANT CHANGE UP
RBC # BLD: 4.39 M/UL — SIGNIFICANT CHANGE UP (ref 3.8–5.2)
RBC # FLD: 12.6 % — SIGNIFICANT CHANGE UP (ref 10.3–14.5)
RBC CASTS # UR COMP ASSIST: SIGNIFICANT CHANGE UP /HPF (ref 0–4)
S AUREUS DNA NOSE QL NAA+PROBE: SIGNIFICANT CHANGE UP
SODIUM SERPL-SCNC: 137 MMOL/L — SIGNIFICANT CHANGE UP (ref 135–145)
SP GR SPEC: 1.01 — SIGNIFICANT CHANGE UP (ref 1.01–1.02)
TYPE + AB SCN PNL BLD: SIGNIFICANT CHANGE UP
UROBILINOGEN FLD QL: NEGATIVE MG/DL — SIGNIFICANT CHANGE UP
WBC # BLD: 9.7 K/UL — SIGNIFICANT CHANGE UP (ref 3.8–10.5)
WBC # FLD AUTO: 9.7 K/UL — SIGNIFICANT CHANGE UP (ref 3.8–10.5)
WBC UR QL: SIGNIFICANT CHANGE UP

## 2017-07-19 PROCEDURE — 71020: CPT | Mod: 26

## 2017-07-19 PROCEDURE — 73562 X-RAY EXAM OF KNEE 3: CPT | Mod: 26,LT

## 2017-07-19 RX ORDER — ATENOLOL 25 MG/1
1 TABLET ORAL
Qty: 0 | Refills: 0 | COMMUNITY

## 2017-07-19 RX ORDER — ASPIRIN/CALCIUM CARB/MAGNESIUM 324 MG
1 TABLET ORAL
Qty: 0 | Refills: 0 | COMMUNITY

## 2017-07-19 RX ORDER — MORPHINE SULFATE 50 MG/1
1 CAPSULE, EXTENDED RELEASE ORAL
Qty: 0 | Refills: 0 | COMMUNITY

## 2017-07-19 NOTE — H&P PST ADULT - PMH
Diverticulosis of intestine without bleeding, unspecified intestinal tract location  Colon resection  Essential hypertension    Gastroesophageal reflux disease, esophagitis presence not specified    Hernia of anterior abdominal wall    Kidney cysts  benign  Kidney stone on right side    Lumbar herniated disc    MRSA (methicillin resistant staph aureus) culture positive  2011  Osteoarthritis of left knee, unspecified osteoarthritis type    Osteoarthritis of right shoulder, unspecified osteoarthritis type    Sciatica of right side    Scoliosis of thoracic spine, unspecified scoliosis type    Seasonal allergic rhinitis, unspecified allergic rhinitis trigger    Spinal stenosis, unspecified spinal region

## 2017-07-19 NOTE — H&P PST ADULT - FAMILY HISTORY
Mother  Still living? No  Maternal family history of emphysema, Age at diagnosis: Age Unknown     Father  Still living? No  Family history of pancreatic cancer, Age at diagnosis: Age Unknown

## 2017-07-19 NOTE — H&P PST ADULT - HISTORY OF PRESENT ILLNESS
62 years old female with constant pain to left knee.  Diagnosed with osteoarthritis of knee. Admits to issues with left knee for years. She had six arthroscopies, physical therapy and intra articular injection to left knee. Denies buckling of knee.  Planned replacement.

## 2017-07-19 NOTE — H&P PST ADULT - ASSESSMENT
62 years old female present to PST prior to left knee replacement with Dr. Bj Bowen III.  Plan   1. NPO after midnight  2. Take the following medications with sips of water on the day of procedure: may take pain medication. Inform perioperative staff on the day of surgery.  3. Use E-Z sponge as directed  4. Use Mupirocin as directed.  5. Drink a quart of extra  fluids the day before your surgery.  6 Medical clearance with Dr. Meadows  7. CBC, BMP, Urinalysis, Type and Screen, MRSA sent to lab  8. EKG, CXR and left knee x-ray done  9. PT/ PTT / INR  on the day of surgery    Caprini Risk Score of 7.

## 2017-07-19 NOTE — H&P PST ADULT - PSH
Bilateral carpal tunnel syndrome  2006, 2007  History of colon resection  Colon Resection - 2005  History of left knee surgery  x 6 Left Knee surgery -2013  S/P rotator cuff repair  right

## 2017-07-24 RX ORDER — OXYCODONE HYDROCHLORIDE 5 MG/1
5 TABLET ORAL EVERY 4 HOURS
Qty: 0 | Refills: 0 | Status: DISCONTINUED | OUTPATIENT
Start: 2017-07-25 | End: 2017-07-26

## 2017-07-24 RX ORDER — CELECOXIB 200 MG/1
200 CAPSULE ORAL
Qty: 0 | Refills: 0 | Status: DISCONTINUED | OUTPATIENT
Start: 2017-07-25 | End: 2017-07-28

## 2017-07-24 RX ORDER — ACETAMINOPHEN 500 MG
650 TABLET ORAL ONCE
Qty: 0 | Refills: 0 | Status: COMPLETED | OUTPATIENT
Start: 2017-07-25 | End: 2017-07-25

## 2017-07-24 RX ORDER — OXYCODONE HYDROCHLORIDE 5 MG/1
10 TABLET ORAL EVERY 4 HOURS
Qty: 0 | Refills: 0 | Status: DISCONTINUED | OUTPATIENT
Start: 2017-07-25 | End: 2017-07-26

## 2017-07-24 RX ORDER — ACETAMINOPHEN 500 MG
650 TABLET ORAL EVERY 6 HOURS
Qty: 0 | Refills: 0 | Status: DISCONTINUED | OUTPATIENT
Start: 2017-07-25 | End: 2017-07-28

## 2017-07-24 RX ORDER — CELECOXIB 200 MG/1
200 CAPSULE ORAL ONCE
Qty: 0 | Refills: 0 | Status: COMPLETED | OUTPATIENT
Start: 2017-07-25 | End: 2017-07-25

## 2017-07-24 RX ORDER — SODIUM CHLORIDE 9 MG/ML
3 INJECTION INTRAMUSCULAR; INTRAVENOUS; SUBCUTANEOUS EVERY 8 HOURS
Qty: 0 | Refills: 0 | Status: DISCONTINUED | OUTPATIENT
Start: 2017-07-25 | End: 2017-07-28

## 2017-07-24 RX ORDER — HYDROMORPHONE HYDROCHLORIDE 2 MG/ML
1 INJECTION INTRAMUSCULAR; INTRAVENOUS; SUBCUTANEOUS
Qty: 0 | Refills: 0 | Status: DISCONTINUED | OUTPATIENT
Start: 2017-07-25 | End: 2017-07-25

## 2017-07-24 RX ORDER — FAMOTIDINE 10 MG/ML
20 INJECTION INTRAVENOUS ONCE
Qty: 0 | Refills: 0 | Status: COMPLETED | OUTPATIENT
Start: 2017-07-25 | End: 2017-07-25

## 2017-07-25 ENCOUNTER — RESULT REVIEW (OUTPATIENT)
Age: 62
End: 2017-07-25

## 2017-07-25 ENCOUNTER — INPATIENT (INPATIENT)
Facility: HOSPITAL | Age: 62
LOS: 2 days | Discharge: HOME HEALTH | End: 2017-07-28
Attending: ORTHOPAEDIC SURGERY | Admitting: ORTHOPAEDIC SURGERY
Payer: MEDICARE

## 2017-07-25 VITALS
OXYGEN SATURATION: 97 % | DIASTOLIC BLOOD PRESSURE: 68 MMHG | HEART RATE: 78 BPM | TEMPERATURE: 98 F | WEIGHT: 179.02 LBS | HEIGHT: 62 IN | SYSTOLIC BLOOD PRESSURE: 146 MMHG | RESPIRATION RATE: 16 BRPM

## 2017-07-25 DIAGNOSIS — Z90.49 ACQUIRED ABSENCE OF OTHER SPECIFIED PARTS OF DIGESTIVE TRACT: Chronic | ICD-10-CM

## 2017-07-25 DIAGNOSIS — Z98.890 OTHER SPECIFIED POSTPROCEDURAL STATES: Chronic | ICD-10-CM

## 2017-07-25 DIAGNOSIS — G56.03 CARPAL TUNNEL SYNDROME, BILATERAL UPPER LIMBS: Chronic | ICD-10-CM

## 2017-07-25 LAB
ANION GAP SERPL CALC-SCNC: 7 MMOL/L — SIGNIFICANT CHANGE UP (ref 5–17)
APTT BLD: 35.9 SEC — SIGNIFICANT CHANGE UP (ref 27.5–37.4)
BUN SERPL-MCNC: 10 MG/DL — SIGNIFICANT CHANGE UP (ref 7–23)
CALCIUM SERPL-MCNC: 8.6 MG/DL — SIGNIFICANT CHANGE UP (ref 8.5–10.1)
CHLORIDE SERPL-SCNC: 109 MMOL/L — HIGH (ref 96–108)
CO2 SERPL-SCNC: 27 MMOL/L — SIGNIFICANT CHANGE UP (ref 22–31)
CREAT SERPL-MCNC: 0.88 MG/DL — SIGNIFICANT CHANGE UP (ref 0.5–1.3)
GLUCOSE SERPL-MCNC: 144 MG/DL — HIGH (ref 70–99)
HCT VFR BLD CALC: 34.5 % — SIGNIFICANT CHANGE UP (ref 34.5–45)
HGB BLD-MCNC: 11.8 G/DL — SIGNIFICANT CHANGE UP (ref 11.5–15.5)
INR BLD: 1.03 RATIO — SIGNIFICANT CHANGE UP (ref 0.88–1.16)
MCHC RBC-ENTMCNC: 29.7 PG — SIGNIFICANT CHANGE UP (ref 27–34)
MCHC RBC-ENTMCNC: 34.1 GM/DL — SIGNIFICANT CHANGE UP (ref 32–36)
MCV RBC AUTO: 87.3 FL — SIGNIFICANT CHANGE UP (ref 80–100)
PLATELET # BLD AUTO: 280 K/UL — SIGNIFICANT CHANGE UP (ref 150–400)
POTASSIUM SERPL-MCNC: 4.1 MMOL/L — SIGNIFICANT CHANGE UP (ref 3.5–5.3)
POTASSIUM SERPL-SCNC: 4.1 MMOL/L — SIGNIFICANT CHANGE UP (ref 3.5–5.3)
PROTHROM AB SERPL-ACNC: 11.1 SEC — SIGNIFICANT CHANGE UP (ref 9.8–12.7)
RBC # BLD: 3.96 M/UL — SIGNIFICANT CHANGE UP (ref 3.8–5.2)
RBC # FLD: 13 % — SIGNIFICANT CHANGE UP (ref 10.3–14.5)
SODIUM SERPL-SCNC: 143 MMOL/L — SIGNIFICANT CHANGE UP (ref 135–145)
WBC # BLD: 9.5 K/UL — SIGNIFICANT CHANGE UP (ref 3.8–10.5)
WBC # FLD AUTO: 9.5 K/UL — SIGNIFICANT CHANGE UP (ref 3.8–10.5)

## 2017-07-25 PROCEDURE — 73560 X-RAY EXAM OF KNEE 1 OR 2: CPT | Mod: 26,LT

## 2017-07-25 PROCEDURE — 88305 TISSUE EXAM BY PATHOLOGIST: CPT | Mod: 26

## 2017-07-25 RX ORDER — ATENOLOL 25 MG/1
25 TABLET ORAL AT BEDTIME
Qty: 0 | Refills: 0 | Status: DISCONTINUED | OUTPATIENT
Start: 2017-07-25 | End: 2017-07-28

## 2017-07-25 RX ORDER — OXYCODONE HYDROCHLORIDE 5 MG/1
15 TABLET ORAL EVERY 12 HOURS
Qty: 0 | Refills: 0 | Status: DISCONTINUED | OUTPATIENT
Start: 2017-07-25 | End: 2017-07-25

## 2017-07-25 RX ORDER — HYDROMORPHONE HYDROCHLORIDE 2 MG/ML
0.5 INJECTION INTRAMUSCULAR; INTRAVENOUS; SUBCUTANEOUS
Qty: 0 | Refills: 0 | Status: DISCONTINUED | OUTPATIENT
Start: 2017-07-25 | End: 2017-07-25

## 2017-07-25 RX ORDER — SODIUM CHLORIDE 9 MG/ML
1000 INJECTION, SOLUTION INTRAVENOUS
Qty: 0 | Refills: 0 | Status: DISCONTINUED | OUTPATIENT
Start: 2017-07-25 | End: 2017-07-28

## 2017-07-25 RX ORDER — ACETAMINOPHEN 500 MG
650 TABLET ORAL EVERY 6 HOURS
Qty: 0 | Refills: 0 | Status: DISCONTINUED | OUTPATIENT
Start: 2017-07-25 | End: 2017-07-28

## 2017-07-25 RX ORDER — FENTANYL CITRATE 50 UG/ML
50 INJECTION INTRAVENOUS
Qty: 0 | Refills: 0 | Status: DISCONTINUED | OUTPATIENT
Start: 2017-07-25 | End: 2017-07-25

## 2017-07-25 RX ORDER — OXYCODONE HYDROCHLORIDE 5 MG/1
30 TABLET ORAL EVERY 4 HOURS
Qty: 0 | Refills: 0 | Status: DISCONTINUED | OUTPATIENT
Start: 2017-07-25 | End: 2017-07-26

## 2017-07-25 RX ORDER — MAGNESIUM HYDROXIDE 400 MG/1
30 TABLET, CHEWABLE ORAL DAILY
Qty: 0 | Refills: 0 | Status: DISCONTINUED | OUTPATIENT
Start: 2017-07-25 | End: 2017-07-28

## 2017-07-25 RX ORDER — SENNA PLUS 8.6 MG/1
2 TABLET ORAL AT BEDTIME
Qty: 0 | Refills: 0 | Status: DISCONTINUED | OUTPATIENT
Start: 2017-07-25 | End: 2017-07-28

## 2017-07-25 RX ORDER — DOCUSATE SODIUM 100 MG
100 CAPSULE ORAL THREE TIMES A DAY
Qty: 0 | Refills: 0 | Status: DISCONTINUED | OUTPATIENT
Start: 2017-07-25 | End: 2017-07-28

## 2017-07-25 RX ORDER — OXYCODONE HYDROCHLORIDE 5 MG/1
10 TABLET ORAL EVERY 12 HOURS
Qty: 0 | Refills: 0 | Status: DISCONTINUED | OUTPATIENT
Start: 2017-07-25 | End: 2017-07-25

## 2017-07-25 RX ORDER — FERROUS SULFATE 325(65) MG
325 TABLET ORAL
Qty: 0 | Refills: 0 | Status: DISCONTINUED | OUTPATIENT
Start: 2017-07-25 | End: 2017-07-28

## 2017-07-25 RX ORDER — POLYETHYLENE GLYCOL 3350 17 G/17G
17 POWDER, FOR SOLUTION ORAL DAILY
Qty: 0 | Refills: 0 | Status: DISCONTINUED | OUTPATIENT
Start: 2017-07-25 | End: 2017-07-28

## 2017-07-25 RX ORDER — OXYCODONE HYDROCHLORIDE 5 MG/1
20 TABLET ORAL ONCE
Qty: 0 | Refills: 0 | Status: DISCONTINUED | OUTPATIENT
Start: 2017-07-25 | End: 2017-07-25

## 2017-07-25 RX ORDER — GABAPENTIN 400 MG/1
300 CAPSULE ORAL AT BEDTIME
Qty: 0 | Refills: 0 | Status: DISCONTINUED | OUTPATIENT
Start: 2017-07-25 | End: 2017-07-28

## 2017-07-25 RX ORDER — DIPHENHYDRAMINE HCL 50 MG
25 CAPSULE ORAL EVERY 4 HOURS
Qty: 0 | Refills: 0 | Status: DISCONTINUED | OUTPATIENT
Start: 2017-07-25 | End: 2017-07-28

## 2017-07-25 RX ORDER — CEFAZOLIN SODIUM 1 G
2000 VIAL (EA) INJECTION EVERY 6 HOURS
Qty: 0 | Refills: 0 | Status: COMPLETED | OUTPATIENT
Start: 2017-07-25 | End: 2017-07-25

## 2017-07-25 RX ORDER — OXYCODONE HYDROCHLORIDE 5 MG/1
15 TABLET ORAL EVERY 4 HOURS
Qty: 0 | Refills: 0 | Status: DISCONTINUED | OUTPATIENT
Start: 2017-07-25 | End: 2017-07-25

## 2017-07-25 RX ORDER — OXYCODONE HYDROCHLORIDE 5 MG/1
20 TABLET ORAL EVERY 12 HOURS
Qty: 0 | Refills: 0 | Status: DISCONTINUED | OUTPATIENT
Start: 2017-07-25 | End: 2017-07-25

## 2017-07-25 RX ORDER — ZOLPIDEM TARTRATE 10 MG/1
5 TABLET ORAL AT BEDTIME
Qty: 0 | Refills: 0 | Status: DISCONTINUED | OUTPATIENT
Start: 2017-07-25 | End: 2017-07-28

## 2017-07-25 RX ORDER — BENZOCAINE AND MENTHOL 5; 1 G/100ML; G/100ML
1 LIQUID ORAL
Qty: 0 | Refills: 0 | Status: DISCONTINUED | OUTPATIENT
Start: 2017-07-25 | End: 2017-07-28

## 2017-07-25 RX ORDER — HYDROMORPHONE HYDROCHLORIDE 2 MG/ML
2 INJECTION INTRAMUSCULAR; INTRAVENOUS; SUBCUTANEOUS EVERY 4 HOURS
Qty: 0 | Refills: 0 | Status: DISCONTINUED | OUTPATIENT
Start: 2017-07-25 | End: 2017-07-28

## 2017-07-25 RX ORDER — AMITRIPTYLINE HCL 25 MG
100 TABLET ORAL AT BEDTIME
Qty: 0 | Refills: 0 | Status: DISCONTINUED | OUTPATIENT
Start: 2017-07-25 | End: 2017-07-28

## 2017-07-25 RX ORDER — ONDANSETRON 8 MG/1
4 TABLET, FILM COATED ORAL EVERY 6 HOURS
Qty: 0 | Refills: 0 | Status: DISCONTINUED | OUTPATIENT
Start: 2017-07-25 | End: 2017-07-28

## 2017-07-25 RX ORDER — PANTOPRAZOLE SODIUM 20 MG/1
40 TABLET, DELAYED RELEASE ORAL DAILY
Qty: 0 | Refills: 0 | Status: DISCONTINUED | OUTPATIENT
Start: 2017-07-25 | End: 2017-07-25

## 2017-07-25 RX ORDER — ASCORBIC ACID 60 MG
500 TABLET,CHEWABLE ORAL
Qty: 0 | Refills: 0 | Status: DISCONTINUED | OUTPATIENT
Start: 2017-07-25 | End: 2017-07-28

## 2017-07-25 RX ORDER — ASPIRIN/CALCIUM CARB/MAGNESIUM 324 MG
325 TABLET ORAL
Qty: 0 | Refills: 0 | Status: DISCONTINUED | OUTPATIENT
Start: 2017-07-25 | End: 2017-07-28

## 2017-07-25 RX ORDER — ONDANSETRON 8 MG/1
4 TABLET, FILM COATED ORAL ONCE
Qty: 0 | Refills: 0 | Status: DISCONTINUED | OUTPATIENT
Start: 2017-07-25 | End: 2017-07-25

## 2017-07-25 RX ORDER — PANTOPRAZOLE SODIUM 20 MG/1
40 TABLET, DELAYED RELEASE ORAL
Qty: 0 | Refills: 0 | Status: DISCONTINUED | OUTPATIENT
Start: 2017-07-25 | End: 2017-07-28

## 2017-07-25 RX ORDER — FOLIC ACID 0.8 MG
1 TABLET ORAL DAILY
Qty: 0 | Refills: 0 | Status: DISCONTINUED | OUTPATIENT
Start: 2017-07-25 | End: 2017-07-28

## 2017-07-25 RX ORDER — OXYCODONE HYDROCHLORIDE 5 MG/1
20 TABLET ORAL EVERY 12 HOURS
Qty: 0 | Refills: 0 | Status: DISCONTINUED | OUTPATIENT
Start: 2017-07-25 | End: 2017-07-26

## 2017-07-25 RX ORDER — ACETAMINOPHEN 500 MG
1000 TABLET ORAL ONCE
Qty: 0 | Refills: 0 | Status: COMPLETED | OUTPATIENT
Start: 2017-07-25 | End: 2017-07-25

## 2017-07-25 RX ADMIN — CELECOXIB 200 MILLIGRAM(S): 200 CAPSULE ORAL at 09:42

## 2017-07-25 RX ADMIN — Medication 100 MILLIGRAM(S): at 21:58

## 2017-07-25 RX ADMIN — HYDROMORPHONE HYDROCHLORIDE 1 MILLIGRAM(S): 2 INJECTION INTRAMUSCULAR; INTRAVENOUS; SUBCUTANEOUS at 13:02

## 2017-07-25 RX ADMIN — HYDROMORPHONE HYDROCHLORIDE 2 MILLIGRAM(S): 2 INJECTION INTRAMUSCULAR; INTRAVENOUS; SUBCUTANEOUS at 20:51

## 2017-07-25 RX ADMIN — Medication 650 MILLIGRAM(S): at 09:42

## 2017-07-25 RX ADMIN — HYDROMORPHONE HYDROCHLORIDE 1 MILLIGRAM(S): 2 INJECTION INTRAMUSCULAR; INTRAVENOUS; SUBCUTANEOUS at 12:46

## 2017-07-25 RX ADMIN — HYDROMORPHONE HYDROCHLORIDE 2 MILLIGRAM(S): 2 INJECTION INTRAMUSCULAR; INTRAVENOUS; SUBCUTANEOUS at 16:16

## 2017-07-25 RX ADMIN — SODIUM CHLORIDE 3 MILLILITER(S): 9 INJECTION INTRAMUSCULAR; INTRAVENOUS; SUBCUTANEOUS at 22:40

## 2017-07-25 RX ADMIN — Medication 400 MILLIGRAM(S): at 12:46

## 2017-07-25 RX ADMIN — HYDROMORPHONE HYDROCHLORIDE 1 MILLIGRAM(S): 2 INJECTION INTRAMUSCULAR; INTRAVENOUS; SUBCUTANEOUS at 12:28

## 2017-07-25 RX ADMIN — Medication 100 MILLIGRAM(S): at 22:06

## 2017-07-25 RX ADMIN — Medication 650 MILLIGRAM(S): at 18:21

## 2017-07-25 RX ADMIN — SODIUM CHLORIDE 75 MILLILITER(S): 9 INJECTION, SOLUTION INTRAVENOUS at 16:15

## 2017-07-25 RX ADMIN — ATENOLOL 25 MILLIGRAM(S): 25 TABLET ORAL at 22:06

## 2017-07-25 RX ADMIN — OXYCODONE HYDROCHLORIDE 20 MILLIGRAM(S): 5 TABLET ORAL at 18:21

## 2017-07-25 RX ADMIN — FAMOTIDINE 20 MILLIGRAM(S): 10 INJECTION INTRAVENOUS at 09:42

## 2017-07-25 RX ADMIN — OXYCODONE HYDROCHLORIDE 30 MILLIGRAM(S): 5 TABLET ORAL at 21:53

## 2017-07-25 RX ADMIN — Medication 50 MILLIGRAM(S): at 21:55

## 2017-07-25 RX ADMIN — GABAPENTIN 300 MILLIGRAM(S): 400 CAPSULE ORAL at 21:56

## 2017-07-25 RX ADMIN — Medication 50 MILLIGRAM(S): at 13:20

## 2017-07-25 RX ADMIN — OXYCODONE HYDROCHLORIDE 30 MILLIGRAM(S): 5 TABLET ORAL at 13:39

## 2017-07-25 RX ADMIN — HYDROMORPHONE HYDROCHLORIDE 2 MILLIGRAM(S): 2 INJECTION INTRAMUSCULAR; INTRAVENOUS; SUBCUTANEOUS at 17:00

## 2017-07-25 RX ADMIN — Medication 325 MILLIGRAM(S): at 21:55

## 2017-07-25 RX ADMIN — Medication 100 MILLIGRAM(S): at 16:16

## 2017-07-25 NOTE — PROGRESS NOTE ADULT - ASSESSMENT
62F s/p Left TKA     P:  WBAT LLE   therapy   DVT ppx   post op abx   venodyens  incentive spirometer   pain control   no pillow under knee  follow labs

## 2017-07-25 NOTE — PROGRESS NOTE ADULT - SUBJECTIVE AND OBJECTIVE BOX
Pt seen at bedside c/o pain to left knee, denies N/T    PE Left knee  dressing c/d/i   FROM ankle and toes  + EHL FHL GS TA   DP 2+   sensation intact   compartments soft non tender

## 2017-07-25 NOTE — PHYSICAL THERAPY INITIAL EVALUATION ADULT - ADDITIONAL COMMENTS
Lives in a private home with 4 steps to enter (no railing) 14 steps to bedroom with railing.  Independent with all functional mobility prior to admission.

## 2017-07-26 ENCOUNTER — TRANSCRIPTION ENCOUNTER (OUTPATIENT)
Age: 62
End: 2017-07-26

## 2017-07-26 LAB
ANION GAP SERPL CALC-SCNC: 4 MMOL/L — LOW (ref 5–17)
APTT BLD: 31.7 SEC — SIGNIFICANT CHANGE UP (ref 27.5–37.4)
BUN SERPL-MCNC: 8 MG/DL — SIGNIFICANT CHANGE UP (ref 7–23)
CALCIUM SERPL-MCNC: 8.4 MG/DL — LOW (ref 8.5–10.1)
CHLORIDE SERPL-SCNC: 110 MMOL/L — HIGH (ref 96–108)
CO2 SERPL-SCNC: 28 MMOL/L — SIGNIFICANT CHANGE UP (ref 22–31)
CREAT SERPL-MCNC: 0.72 MG/DL — SIGNIFICANT CHANGE UP (ref 0.5–1.3)
GLUCOSE SERPL-MCNC: 147 MG/DL — HIGH (ref 70–99)
HCT VFR BLD CALC: 30 % — LOW (ref 34.5–45)
HGB BLD-MCNC: 10.2 G/DL — LOW (ref 11.5–15.5)
INR BLD: 1.04 RATIO — SIGNIFICANT CHANGE UP (ref 0.88–1.16)
MCHC RBC-ENTMCNC: 29.7 PG — SIGNIFICANT CHANGE UP (ref 27–34)
MCHC RBC-ENTMCNC: 33.9 GM/DL — SIGNIFICANT CHANGE UP (ref 32–36)
MCV RBC AUTO: 87.6 FL — SIGNIFICANT CHANGE UP (ref 80–100)
PLATELET # BLD AUTO: 241 K/UL — SIGNIFICANT CHANGE UP (ref 150–400)
POTASSIUM SERPL-MCNC: 4.6 MMOL/L — SIGNIFICANT CHANGE UP (ref 3.5–5.3)
POTASSIUM SERPL-SCNC: 4.6 MMOL/L — SIGNIFICANT CHANGE UP (ref 3.5–5.3)
PROTHROM AB SERPL-ACNC: 11.2 SEC — SIGNIFICANT CHANGE UP (ref 9.8–12.7)
RBC # BLD: 3.43 M/UL — LOW (ref 3.8–5.2)
RBC # FLD: 13.4 % — SIGNIFICANT CHANGE UP (ref 10.3–14.5)
SODIUM SERPL-SCNC: 142 MMOL/L — SIGNIFICANT CHANGE UP (ref 135–145)
WBC # BLD: 10.7 K/UL — HIGH (ref 3.8–10.5)
WBC # FLD AUTO: 10.7 K/UL — HIGH (ref 3.8–10.5)

## 2017-07-26 PROCEDURE — 99222 1ST HOSP IP/OBS MODERATE 55: CPT

## 2017-07-26 RX ORDER — OXYCODONE HYDROCHLORIDE 5 MG/1
30 TABLET ORAL
Qty: 0 | Refills: 0 | Status: DISCONTINUED | OUTPATIENT
Start: 2017-07-26 | End: 2017-07-28

## 2017-07-26 RX ORDER — OXYCODONE HYDROCHLORIDE 5 MG/1
20 TABLET ORAL EVERY 8 HOURS
Qty: 0 | Refills: 0 | Status: DISCONTINUED | OUTPATIENT
Start: 2017-07-26 | End: 2017-07-28

## 2017-07-26 RX ORDER — HYDROMORPHONE HYDROCHLORIDE 2 MG/ML
1 INJECTION INTRAMUSCULAR; INTRAVENOUS; SUBCUTANEOUS ONCE
Qty: 0 | Refills: 0 | Status: DISCONTINUED | OUTPATIENT
Start: 2017-07-26 | End: 2017-07-26

## 2017-07-26 RX ADMIN — OXYCODONE HYDROCHLORIDE 20 MILLIGRAM(S): 5 TABLET ORAL at 17:04

## 2017-07-26 RX ADMIN — OXYCODONE HYDROCHLORIDE 20 MILLIGRAM(S): 5 TABLET ORAL at 06:18

## 2017-07-26 RX ADMIN — SODIUM CHLORIDE 3 MILLILITER(S): 9 INJECTION INTRAMUSCULAR; INTRAVENOUS; SUBCUTANEOUS at 22:12

## 2017-07-26 RX ADMIN — HYDROMORPHONE HYDROCHLORIDE 2 MILLIGRAM(S): 2 INJECTION INTRAMUSCULAR; INTRAVENOUS; SUBCUTANEOUS at 19:02

## 2017-07-26 RX ADMIN — Medication 650 MILLIGRAM(S): at 06:18

## 2017-07-26 RX ADMIN — OXYCODONE HYDROCHLORIDE 30 MILLIGRAM(S): 5 TABLET ORAL at 23:05

## 2017-07-26 RX ADMIN — CELECOXIB 200 MILLIGRAM(S): 200 CAPSULE ORAL at 09:59

## 2017-07-26 RX ADMIN — Medication 500 MILLIGRAM(S): at 19:06

## 2017-07-26 RX ADMIN — Medication 325 MILLIGRAM(S): at 13:10

## 2017-07-26 RX ADMIN — POLYETHYLENE GLYCOL 3350 17 GRAM(S): 17 POWDER, FOR SOLUTION ORAL at 13:11

## 2017-07-26 RX ADMIN — HYDROMORPHONE HYDROCHLORIDE 1 MILLIGRAM(S): 2 INJECTION INTRAMUSCULAR; INTRAVENOUS; SUBCUTANEOUS at 10:46

## 2017-07-26 RX ADMIN — SODIUM CHLORIDE 3 MILLILITER(S): 9 INJECTION INTRAMUSCULAR; INTRAVENOUS; SUBCUTANEOUS at 14:37

## 2017-07-26 RX ADMIN — PANTOPRAZOLE SODIUM 40 MILLIGRAM(S): 20 TABLET, DELAYED RELEASE ORAL at 06:18

## 2017-07-26 RX ADMIN — Medication 1 MILLIGRAM(S): at 13:10

## 2017-07-26 RX ADMIN — Medication 650 MILLIGRAM(S): at 19:07

## 2017-07-26 RX ADMIN — Medication 100 MILLIGRAM(S): at 22:08

## 2017-07-26 RX ADMIN — Medication 325 MILLIGRAM(S): at 19:05

## 2017-07-26 RX ADMIN — Medication 325 MILLIGRAM(S): at 10:00

## 2017-07-26 RX ADMIN — SODIUM CHLORIDE 75 MILLILITER(S): 9 INJECTION, SOLUTION INTRAVENOUS at 06:19

## 2017-07-26 RX ADMIN — GABAPENTIN 300 MILLIGRAM(S): 400 CAPSULE ORAL at 22:08

## 2017-07-26 RX ADMIN — HYDROMORPHONE HYDROCHLORIDE 2 MILLIGRAM(S): 2 INJECTION INTRAMUSCULAR; INTRAVENOUS; SUBCUTANEOUS at 09:57

## 2017-07-26 RX ADMIN — Medication 1 TABLET(S): at 13:08

## 2017-07-26 RX ADMIN — Medication 1 TABLET(S): at 14:43

## 2017-07-26 RX ADMIN — OXYCODONE HYDROCHLORIDE 20 MILLIGRAM(S): 5 TABLET ORAL at 22:08

## 2017-07-26 RX ADMIN — Medication 50 MILLIGRAM(S): at 06:18

## 2017-07-26 RX ADMIN — HYDROMORPHONE HYDROCHLORIDE 2 MILLIGRAM(S): 2 INJECTION INTRAMUSCULAR; INTRAVENOUS; SUBCUTANEOUS at 15:38

## 2017-07-26 RX ADMIN — OXYCODONE HYDROCHLORIDE 30 MILLIGRAM(S): 5 TABLET ORAL at 13:04

## 2017-07-26 RX ADMIN — Medication 100 MILLIGRAM(S): at 14:45

## 2017-07-26 RX ADMIN — Medication 50 MILLIGRAM(S): at 14:43

## 2017-07-26 RX ADMIN — Medication 650 MILLIGRAM(S): at 13:09

## 2017-07-26 RX ADMIN — Medication 1 TABLET(S): at 22:07

## 2017-07-26 RX ADMIN — Medication 325 MILLIGRAM(S): at 19:07

## 2017-07-26 RX ADMIN — HYDROMORPHONE HYDROCHLORIDE 2 MILLIGRAM(S): 2 INJECTION INTRAMUSCULAR; INTRAVENOUS; SUBCUTANEOUS at 00:48

## 2017-07-26 RX ADMIN — Medication 325 MILLIGRAM(S): at 06:18

## 2017-07-26 RX ADMIN — Medication 50 MILLIGRAM(S): at 22:08

## 2017-07-26 RX ADMIN — OXYCODONE HYDROCHLORIDE 30 MILLIGRAM(S): 5 TABLET ORAL at 22:08

## 2017-07-26 RX ADMIN — SODIUM CHLORIDE 3 MILLILITER(S): 9 INJECTION INTRAMUSCULAR; INTRAVENOUS; SUBCUTANEOUS at 05:30

## 2017-07-26 RX ADMIN — ATENOLOL 25 MILLIGRAM(S): 25 TABLET ORAL at 22:08

## 2017-07-26 NOTE — CONSULT NOTE ADULT - SUBJECTIVE AND OBJECTIVE BOX
CC: left knee pain    HPI:  Patient is a 62y old female s/p left knee replacement with Dr. Bowen on 7/25.      Consulted by Dr. Bowen for VTE prophylaxis, risk stratification, and anticoagulation management.    PAST MEDICAL & SURGICAL HISTORY:  MRSA (methicillin resistant staph aureus) culture positive: 2011  Sciatica of right side  Spinal stenosis, unspecified spinal region  Lumbar herniated disc  Scoliosis of thoracic spine, unspecified scoliosis type  Osteoarthritis of right shoulder, unspecified osteoarthritis type  Osteoarthritis of left knee, unspecified osteoarthritis type  Kidney cysts: benign  Kidney stone on right side  Diverticulosis of intestine without bleeding, unspecified intestinal tract location: Colon resection  Hernia of anterior abdominal wall  Gastroesophageal reflux disease, esophagitis presence not specified  Essential hypertension  Seasonal allergic rhinitis, unspecified allergic rhinitis trigger  S/P rotator cuff repair: right  Bilateral carpal tunnel syndrome: 2006, 2007  History of left knee surgery: x 6 Left Knee surgery -2013  History of colon resection: Colon Resection - 2005    BMI: 32     CrCl: 65.7    Caprini VTE Risk Score: 7 (low)    IMPROVE Bleeding Risk Score: 1.5 (low)    Falls Risk:   High (x  )  Mod (  )  Low (  )      FAMILY HISTORY:  Family history of pancreatic cancer (Father)  Maternal family history of emphysema (Mother)    Denies any personal or familial history of clotting or bleeding disorders.    Allergies    Augmentin (Other)    Intolerances        REVIEW OF SYSTEMS    (  )Fever	     (  )Constipation	(  )SOB				(  )Headache	(  )Dysuria  (  )Chills	     (  )Melena	(  )Dyspnea present on exertion	                    (  )Dizziness                    (  )Polyuria  (  )Nausea	     (  )Hematochezia	(  )Cough			                    (  )Syncope   	(  )Hematuria  (  )Vomiting    (  )Chest Pain	(  )Wheezing			(  )Weakness  (  )Diarrhea     (  )Palpitations	(  )Anorexia			(  )Myalgia    All other review of systems negative: Yes    Unable to obtain review of systems due to:      PHYSICAL EXAM:    Constitutional: Appears Well    Neurological: A& O x 3    Skin: Warm    Respiratory and Thorax: normal effort; Breath sounds: normal; No rales/wheezing/rhonchi  	  Cardiovascular: S1, S2, regular, NMBR	    Gastrointestinal: BS + x 4Q, nontender	    Genitourinary:  Bladder nondistended, nontender    Musculoskeletal:   General Right:   no muscle/joint tenderness,   normal tone, no joint swelling,   ROM: full	    General Left:   + muscle/joint tenderness,   normal tone, no joint swelling,   ROM: limited    Knee:  Left: Incision: ; Dressing CDI    Lower extrems:   Right: no calf tenderness              negative ole's sign               + pedal pulses    Left:   no calf tenderness              negative ole's sign               + pedal pulses                          10.2   10.7  )-----------( 241      ( 26 Jul 2017 06:05 )             30.0       07-26    142  |  110<H>  |  8   ----------------------------<  147<H>  4.6   |  28  |  0.72    Ca    8.4<L>      26 Jul 2017 06:05        PT/INR - ( 26 Jul 2017 06:05 )   PT: 11.2 sec;   INR: 1.04 ratio         PTT - ( 26 Jul 2017 06:05 )  PTT:31.7 sec				    MEDICATIONS  (STANDING):  sodium chloride 0.9% lock flush 3 milliLiter(s) IV Push every 8 hours  acetaminophen   Tablet 650 milliGRAM(s) Oral every 6 hours  celecoxib 200 milliGRAM(s) Oral with breakfast  lactated ringers. 1000 milliLiter(s) IV Continuous <Continuous>  aspirin enteric coated 325 milliGRAM(s) Oral two times a day  calcium carbonate 1250 mG + Vitamin D (OsCal 500 + D) 1 Tablet(s) Oral three times a day  polyethylene glycol 3350 17 Gram(s) Oral daily  docusate sodium 100 milliGRAM(s) Oral three times a day  ferrous    sulfate 325 milliGRAM(s) Oral three times a day with meals  folic acid 1 milliGRAM(s) Oral daily  multivitamin 1 Tablet(s) Oral daily  ascorbic acid 500 milliGRAM(s) Oral two times a day  pantoprazole    Tablet 40 milliGRAM(s) Oral before breakfast  gabapentin 300 milliGRAM(s) Oral at bedtime  pregabalin 50 milliGRAM(s) Oral three times a day  amitriptyline 100 milliGRAM(s) Oral at bedtime  ATENolol  Tablet 25 milliGRAM(s) Oral at bedtime  metaxalone Oral Tab/Cap - Peds 800 milliGRAM(s) Oral every 8 hours  oxyCODONE  ER Tablet 20 milliGRAM(s) Oral every 8 hours      Vital Signs Last 24 Hrs  T(C): 36.7 (26 Jul 2017 07:24), Max: 36.8 (25 Jul 2017 22:11)  T(F): 98 (26 Jul 2017 07:24), Max: 98.2 (25 Jul 2017 22:11)  HR: 90 (26 Jul 2017 07:24) (71 - 90)  BP: 131/73 (26 Jul 2017 07:24) (94/55 - 144/70)  BP(mean): --  RR: 18 (26 Jul 2017 07:24) (14 - 21)  SpO2: 99% (26 Jul 2017 07:24) (96% - 100%)    DVT Prophylaxis:  LMWH                   (  )  Heparin SQ           (  )  Coumadin             (  )  Xarelto                  (  )  Eliquis                   (  )  Venodynes           (x  )  Ambulation          ( x)  UFH                       (  )  Contraindicated  (  )
PCP: Dr. Arlene Meadows    Chief Complaint: Left knee pain    HPI: 62F, PMH Of HTN, HLD, diverticulosis, GERD, Lumbar herniated disc, OA Left knee, right shoulder, sciatica, spinal stenosis, who is admitted for elective total Left knee replacement. Hospitalist service consulted for medical comanagement.   Pt seen and examined on 2N. FEels well. States she's numb from right above her left knee down. Wasn't aware she got a nerve block. No sob/chest pain. Ate a cookie. No n/v.     REVIEW OF SYSTEMS: all 10 systems reviewed and is as per HPI otherwise negative.     PMH: as above  PSH: partial colectomy for diverticulitis, multiple left knee arthroscopies, endometrial biop;sy, carpal tunnel release, rotator cuff repair.  F/H: sister-endometriosis, breast ca, mother-borderline DM, copd, leukemia, Father-pancreatic cancer.   Social: former smoker (15 pack year history), ETOH-denies  Allergies: Augmentin  Home meds: see med rec      Vital Signs Last 24 Hrs  T(C): 36.6 (25 Jul 2017 15:38), Max: 36.6 (25 Jul 2017 15:38)  T(F): 97.8 (25 Jul 2017 15:38), Max: 97.8 (25 Jul 2017 15:38)  HR: 71 (25 Jul 2017 15:38) (71 - 86)  BP: 119/50 (25 Jul 2017 15:38) (94/55 - 146/68)  RR: 18 (25 Jul 2017 15:38) (14 - 21)  SpO2: 99% (25 Jul 2017 15:38) (97% - 100%)    I&O's Summary    25 Jul 2017 07:01  -  25 Jul 2017 16:04  --------------------------------------------------------  IN: 1500 mL / OUT: 150 mL / NET: 1350 mL    PHYSICAL EXAM:    Constitutional: NAD, awake and alert, well-developed  HEENT: PERR, EOMI, Normal Hearing, MMM  Neck: Soft and supple, No LAD, No JVD  Respiratory: Breath sounds are clear bilaterally, No wheezing, rales or rhonchi  Cardiovascular: S1 and S2, regular rate and rhythm, no Murmurs, gallops or rubs  Gastrointestinal: Bowel Sounds present, soft, nontender, nondistended, no guarding, no rebound  Extremities: No peripheral edema. Left leg in acewrap. +hemovac.   Vascular: 2+ peripheral pulses  Neurological: A/O x 3, no focal deficits  Musculoskeletal: NOn focal other than numbness LLE (s/p nerve block)  Skin: No rashes    Medications:  MEDICATIONS  (STANDING):  sodium chloride 0.9% lock flush 3 milliLiter(s) IV Push every 8 hours  acetaminophen   Tablet 650 milliGRAM(s) Oral every 6 hours  oxyCODONE  ER Tablet 10 milliGRAM(s) Oral every 12 hours  celecoxib 200 milliGRAM(s) Oral with breakfast  lactated ringers. 1000 milliLiter(s) (75 mL/Hr) IV Continuous <Continuous>  aspirin enteric coated 325 milliGRAM(s) Oral two times a day  calcium carbonate 1250 mG + Vitamin D (OsCal 500 + D) 1 Tablet(s) Oral three times a day  polyethylene glycol 3350 17 Gram(s) Oral daily  docusate sodium 100 milliGRAM(s) Oral three times a day  ferrous    sulfate 325 milliGRAM(s) Oral three times a day with meals  folic acid 1 milliGRAM(s) Oral daily  multivitamin 1 Tablet(s) Oral daily  ascorbic acid 500 milliGRAM(s) Oral two times a day  pantoprazole    Tablet 40 milliGRAM(s) Oral before breakfast  gabapentin 300 milliGRAM(s) Oral at bedtime  pregabalin 50 milliGRAM(s) Oral three times a day  amitriptyline 100 milliGRAM(s) Oral at bedtime  ATENolol  Tablet 25 milliGRAM(s) Oral at bedtime  metaxalone Oral Tab/Cap - Peds 800 milliGRAM(s) Oral every 8 hours  ceFAZolin   IVPB 2000 milliGRAM(s) IV Intermittent every 6 hours  oxyCODONE  ER Tablet 15 milliGRAM(s) Oral every 12 hours      Labs: All Labs Reviewed:                        11.8   9.5   )-----------( 280      ( 25 Jul 2017 12:41 )             34.5     07-25    143  |  109<H>  |  10  ----------------------------<  144<H>  4.1   |  27  |  0.88    Ca    8.6      25 Jul 2017 12:41      PT/INR - ( 25 Jul 2017 08:52 )   PT: 11.1 sec;   INR: 1.03 ratio         PTT - ( 25 Jul 2017 08:52 )  PTT:35.9 sec    ASSESSMENT AND PLAN:    62f, PMH as above a/w:    1. Left knee OA s/p TKR:  -pod#0  -pain control  -physical therapy  -incentive spirometry    2. HTN:  -continue bb    3. HLD: not on statin    4. h/o spinal stenosis/sciatica:  -continue lyrica/elavil/muscle relaxants.    5. DVT px    Thank you for this consultation, will follow with you.

## 2017-07-26 NOTE — DISCHARGE NOTE ADULT - CARE PLAN
Principal Discharge DX:	Osteoarthritis of left knee, unspecified osteoarthritis type  Goal:	Return to baseline ADLs  Instructions for follow-up, activity and diet:	1.	ROM 0-full as tolerated, gently increase daily.  2.	Walking with full weight bearing as tolerated, with rolling walker  3.	DVT Prophylaxis per anticoagulation team recommendations  4.	PT Daily  5.	Follow up with Dr. Bowen as Outpatient in 10-14 Days after Discharge from the Hospital or Rehab. Call Office For Appointment.   6.	Remove Staples Post-Op Day 14. Remove old and place new Aquacel  Bandage Q7days until staples removed.   7.	Ice plenty  8.	OK to shower as long as Aquacel Bandage is used for wound. Principal Discharge DX:	Osteoarthritis of left knee, unspecified osteoarthritis type  Goal:	Return to baseline ADLs  Instructions for follow-up, activity and diet:	Discharge Instructions Total Hip Arthroplasty    1. Diet: Resume previous diet    2. Activity: WBAT. Rolling walker. Posterior Hip Dislocation Precautions. Abduction Pillow while in bed and Chair. Daily Physical Therapy.    3. Call with: fever over 101, wound redness, drainage or open area, calf pain/calf swelling.    4. Wound Care: Remove old and Place new Aquacel bandage to hip wound every 7days. Remove Sutures/Staples Post Op Day #14 so long as wound is healed, no drainage or open area. OK to Shower with Aquacel.  Avoid direct water beating on bandage.     5. DVT PE Prophylaxis: see med rec for details/dosing.  **Aspirin  BID x 30days OR     INR Daily until levels stable.  -Continue Pepcid while on Anticoagulant (Aspirin, Coumadin, Lovenox).    6. Labs: Check H&H weekly while on Anticoagulation    7. Follow Up: Orthopedics, 10-14 days in office. Call to schedule. If going home, eRX sent to your pharmacy for . Principal Discharge DX:	Osteoarthritis of left knee, unspecified osteoarthritis type  Goal:	Return to baseline ADLs  Instructions for follow-up, activity and diet:	Discharge Instructions for Total Knee Arthroplasty    1.  Diet: Resume previous diet    2. Activity: WBAT, Rolling walker, Daily PT. Gentle ROM 0-full as tolerated.     3. Call with: fever over 101, wound redness, drainage or open area, calf pain/calf swelling    4. Wound Care: Remove old and place new Aquacel  bandage to Knee every 7 days. Remove Sutures/Staples Post Op Day #14 (8/8/17) so long as wound is healed, no drainage or open area. OK to Shower with Aquacel.  Avoid direct water beating on bandage.  Continue ICE packs to knee.    5. DVT PE Prophylaxis:  -**Continue Aspirin for 30 days total.  -Continue Pepcid while on Anticoagulant (Aspirin).    6. Labs: Check H&H weekly while on Anticoagulation    7. Follow Up: Orthopedics, 10-14 days in office. Call to schedule. If going home, eRX sent to your pharmacy for . Principal Discharge DX:	Osteoarthritis of left knee, unspecified osteoarthritis type  Goal:	Return to baseline ADLs  Instructions for follow-up, activity and diet:	Discharge Instructions for Total Knee Arthroplasty    1.  Diet: Resume previous diet    2. Activity: WBAT, Rolling walker, Daily PT. Gentle ROM 0-full as tolerated.     3. Call with: fever over 101, wound redness, drainage or open area, calf pain/calf swelling    4. Wound Care: Remove old and place new Aquacel  bandage to Knee every 7 days. Remove Sutures/Staples Post Op Day #14 (8/8/17) so long as wound is healed, no drainage or open area. OK to Shower with Aquacel.  Avoid direct water beating on bandage.  Continue ICE packs to knee.    5. DVT PE Prophylaxis:  -Continue Aspirin for 30 days total.  -Continue Pepcid while on Anticoagulant (Aspirin).    6. Labs: Check H&H weekly while on Anticoagulation    7. Follow Up: Orthopedics, 10-14 days in office. Call to schedule. If going home, eRX sent to your pharmacy for .

## 2017-07-26 NOTE — DISCHARGE NOTE ADULT - MEDICATION SUMMARY - MEDICATIONS TO TAKE
I will START or STAY ON the medications listed below when I get home from the hospital:    Estroven  -- 1 tab(s) by mouth once a day  -- Indication: For home med    aspirin 81 mg oral tablet  -- 1 tab(s) by mouth once a day  -- Indication: For home med    Aspirin Enteric Coated 325 mg oral delayed release tablet  -- 1 tab(s) by mouth every 12 hours. For Blood Clot Prevention  -- Swallow whole.  Do not crush.  Take with food or milk.    -- Indication: For dvt ppx    oxyCODONE 30 mg oral tablet  -- 1 tab(s) by mouth every 6 hours  -- as per I-STOP  -- Indication: For pain    MS Contin 30 mg oral tablet, extended release  -- 1 tab(s) by mouth 2 times a day  -- as per I-STOP  -- Indication: For pain    Tylenol 325 mg oral capsule  -- 2 cap(s) by mouth 4 times a day  -- Indication: For home med    Lyrica 50 mg oral capsule  -- 1 cap(s) by mouth 3 times a day  -- as per I-STOP  -- Indication: For home med    gabapentin 300 mg oral tablet  -- 1 tab(s) by mouth once a day (at bedtime)  -- Indication: For home med    amitriptyline 100 mg oral tablet  -- 1 tab(s) by mouth once a day (at bedtime)  -- Indication: For home med    atenolol 25 mg oral tablet  -- 1 tab(s) by mouth once a day (at bedtime)  -- Indication: For home med    calcium citrate  -- 630 milligram(s) by mouth once a day  -- Indication: For home med    chromium picolinate  -- 1000 microgram(s) by mouth once a day  -- Indication: For home med    tiZANidine 4 mg oral tablet  -- 2 tab(s) by mouth every 8 hours  -- Indication: For home med    metaxalone 800 mg oral tablet  -- 1 tab(s) by mouth once a day (at bedtime)  -- Indication: For home med    Fish Oil 1000 mg oral capsule  -- 1 cap(s) by mouth 2 times a day  -- Indication: For home med    omeprazole 20 mg oral delayed release capsule  -- 1 cap(s) by mouth once a day (in the morning)  -- Indication: For home med    Vitamin B-100 oral tablet  -- 1 tab(s) by mouth once a day  -- Indication: For home med    Vitamin B-100 oral tablet  -- 1 tab(s) by mouth once a day  -- Indication: For home med    Vitamin C 1000 mg oral tablet  -- 1 tab(s) by mouth once a day  -- Indication: For home med    Vitamin C 500 mg oral tablet  -- 2  by mouth   -- Indication: For home med I will START or STAY ON the medications listed below when I get home from the hospital:    Estroven  -- 1 tab(s) by mouth once a day  -- Indication: For home med    aspirin 81 mg oral tablet  -- 1 tab(s) by mouth once a day  -- Indication: For home med    oxyCODONE 10 mg oral tablet  -- 1 tab(s) by mouth every 8 hours, As Needed -for rash -for severe pain MDD:3 tabs  -- Caution federal law prohibits the transfer of this drug to any person other  than the person for whom it was prescribed.  Check with your doctor before becoming pregnant.  Do not drink alcoholic beverages when taking this medication.  May cause drowsiness.  Alcohol may intensify this effect.  Use care when operating dangerous machinery.  This drug may impair the ability to drive or operate machinery.  Use care until you become familiar with its effects.  This prescription cannot be refilled.  Using more of this medication than prescribed may cause serious breathing problems.    -- Indication: For pain    Aspirin Enteric Coated 325 mg oral delayed release tablet  -- 1 tab(s) by mouth every 12 hours. For Blood Clot Prevention  -- Swallow whole.  Do not crush.  Take with food or milk.    -- Indication: For dvt ppx    oxyCODONE 30 mg oral tablet  -- 1 tab(s) by mouth every 6 hours  -- as per I-STOP  -- Indication: For pain    MS Contin 30 mg oral tablet, extended release  -- 1 tab(s) by mouth 2 times a day  -- as per I-STOP  -- Indication: For pain    Tylenol 325 mg oral capsule  -- 2 cap(s) by mouth 4 times a day  -- Indication: For home med    Lyrica 50 mg oral capsule  -- 1 cap(s) by mouth 3 times a day  -- as per I-STOP  -- Indication: For home med    gabapentin 300 mg oral tablet  -- 1 tab(s) by mouth once a day (at bedtime)  -- Indication: For home med    amitriptyline 100 mg oral tablet  -- 1 tab(s) by mouth once a day (at bedtime)  -- Indication: For home med    atenolol 25 mg oral tablet  -- 1 tab(s) by mouth once a day (at bedtime)  -- Indication: For home med    polyethylene glycol 3350 oral powder for reconstitution  -- 17 gram(s) by mouth once a day  -- Indication: For constipation    senna oral tablet  -- 2 tab(s) by mouth once a day (at bedtime), As needed, Constipation  -- Indication: For constipation    docusate sodium 100 mg oral capsule  -- 1 cap(s) by mouth 3 times a day  -- Indication: For constipation    chromium picolinate  -- 1000 microgram(s) by mouth once a day  -- Indication: For home med    calcium citrate  -- 630 milligram(s) by mouth once a day  -- Indication: For home med    tiZANidine 4 mg oral tablet  -- 2 tab(s) by mouth every 8 hours  -- Indication: For home med    metaxalone 800 mg oral tablet  -- 1 tab(s) by mouth once a day (at bedtime)  -- Indication: For home med    Fish Oil 1000 mg oral capsule  -- 1 cap(s) by mouth 2 times a day  -- Indication: For home med    omeprazole 20 mg oral delayed release capsule  -- 1 cap(s) by mouth once a day (in the morning)  -- Indication: For home med    Vitamin B-100 oral tablet  -- 1 tab(s) by mouth once a day  -- Indication: For home med    Vitamin B-100 oral tablet  -- 1 tab(s) by mouth once a day  -- Indication: For home med    Vitamin C 1000 mg oral tablet  -- 1 tab(s) by mouth once a day  -- Indication: For home med    Vitamin C 500 mg oral tablet  -- 2  by mouth   -- Indication: For home med I will START or STAY ON the medications listed below when I get home from the hospital:    Estroven  -- 1 tab(s) by mouth once a day  -- Indication: For home med    oxyCODONE 10 mg oral tablet  -- 1 tab(s) by mouth every 8 hours, As Needed -for rash -for severe pain MDD:3 tabs  -- Caution federal law prohibits the transfer of this drug to any person other  than the person for whom it was prescribed.  Check with your doctor before becoming pregnant.  Do not drink alcoholic beverages when taking this medication.  May cause drowsiness.  Alcohol may intensify this effect.  Use care when operating dangerous machinery.  This drug may impair the ability to drive or operate machinery.  Use care until you become familiar with its effects.  This prescription cannot be refilled.  Using more of this medication than prescribed may cause serious breathing problems.    -- Indication: For pain    Aspirin Enteric Coated 325 mg oral delayed release tablet  -- 1 tab(s) by mouth every 12 hours. For Blood Clot Prevention  -- Swallow whole.  Do not crush.  Take with food or milk.    -- Indication: For dvt ppx    aspirin 81 mg oral tablet  -- 1 tab(s) by mouth once a day  -- Indication: For home med    oxyCODONE 30 mg oral tablet  -- 1 tab(s) by mouth every 6 hours  -- as per I-STOP  -- Indication: For pain    MS Contin 30 mg oral tablet, extended release  -- 1 tab(s) by mouth 2 times a day  -- as per I-STOP  -- Indication: For pain    Tylenol 325 mg oral capsule  -- 2 cap(s) by mouth 4 times a day  -- Indication: For home med    Lyrica 50 mg oral capsule  -- 1 cap(s) by mouth 3 times a day  -- as per I-STOP  -- Indication: For home med    gabapentin 300 mg oral tablet  -- 1 tab(s) by mouth once a day (at bedtime)  -- Indication: For home med    amitriptyline 100 mg oral tablet  -- 1 tab(s) by mouth once a day (at bedtime)  -- Indication: For home med    atenolol 25 mg oral tablet  -- 1 tab(s) by mouth once a day (at bedtime)  -- Indication: For home med    polyethylene glycol 3350 oral powder for reconstitution  -- 17 gram(s) by mouth once a day  -- Indication: For constipation    senna oral tablet  -- 2 tab(s) by mouth once a day (at bedtime), As needed, Constipation  -- Indication: For constipation    docusate sodium 100 mg oral capsule  -- 1 cap(s) by mouth 3 times a day  -- Indication: For constipation    Colace 100 mg oral capsule  -- 1 cap(s) by mouth 2 times a day -for constipation  -- Medication should be taken with plenty of water.    -- Indication: For stool softener    chromium picolinate  -- 1000 microgram(s) by mouth once a day  -- Indication: For home med    calcium citrate  -- 630 milligram(s) by mouth once a day  -- Indication: For home med    tiZANidine 4 mg oral tablet  -- 2 tab(s) by mouth every 8 hours  -- Indication: For home med    metaxalone 800 mg oral tablet  -- 1 tab(s) by mouth once a day (at bedtime)  -- Indication: For home med    Fish Oil 1000 mg oral capsule  -- 1 cap(s) by mouth 2 times a day  -- Indication: For home med    omeprazole 20 mg oral delayed release capsule  -- 1 cap(s) by mouth once a day (in the morning)  -- Indication: For home med    Vitamin B-100 oral tablet  -- 1 tab(s) by mouth once a day  -- Indication: For home med    Vitamin B-100 oral tablet  -- 1 tab(s) by mouth once a day  -- Indication: For home med    Vitamin C 1000 mg oral tablet  -- 1 tab(s) by mouth once a day  -- Indication: For home med    Vitamin C 500 mg oral tablet  -- 2  by mouth   -- Indication: For home med I will START or STAY ON the medications listed below when I get home from the hospital:    aspirin 325 mg oral delayed release tablet  -- 1 tab by mouth 2 times a day x total of 4 weeks. Once completed, resume ASA 81mg PO daily per usual  -- Indication: For blood clot prevention    gabapentin 300 mg oral capsule  -- 1 cap(s) by mouth once a day (at bedtime)  -- Indication: For home med    pregabalin 50 mg oral capsule  -- 1 cap(s) by mouth 3 times a day  -- Indication: For home med    amitriptyline 100 mg oral tablet  -- 1 tab(s) by mouth once a day (at bedtime)  -- Indication: For home med    atenolol 25 mg oral tablet  -- 1 tab(s) by mouth once a day (at bedtime)  -- Indication: For home med    pantoprazole 40 mg oral delayed release tablet  -- 1 tab(s) by mouth once a day (before a meal)  -- Indication: For GI protection home med    calcium-vitamin D 500 mg-200 intl units oral tablet  -- 1 tab(s) by mouth 3 times a day  -- Indication: For home med I will START or STAY ON the medications listed below when I get home from the hospital:    aspirin 325 mg oral delayed release tablet  -- 1 tab by mouth 2 times a day x total of 4 weeks. Once completed, resume ASA 81mg PO daily per usual  -- Indication: For blood clot prevention    oxyCODONE 10 mg oral tablet  -- 1 tab(s) by mouth every 8 hours -for severe pain MDD:4, on behalf of Dr. Bowen  -- Caution federal law prohibits the transfer of this drug to any person other  than the person for whom it was prescribed.  Check with your doctor before becoming pregnant.  Do not drink alcoholic beverages when taking this medication.  May cause drowsiness.  Alcohol may intensify this effect.  Use care when operating dangerous machinery.  This drug may impair the ability to drive or operate machinery.  Use care until you become familiar with its effects.  This prescription cannot be refilled.  Using more of this medication than prescribed may cause serious breathing problems.    -- Indication: For Severe pain, post knee replacement, behalf of Dr. Bowen    gabapentin 300 mg oral capsule  -- 1 cap(s) by mouth once a day (at bedtime)  -- Indication: For home med    pregabalin 50 mg oral capsule  -- 1 cap(s) by mouth 3 times a day  -- Indication: For home med    amitriptyline 100 mg oral tablet  -- 1 tab(s) by mouth once a day (at bedtime)  -- Indication: For home med    atenolol 25 mg oral tablet  -- 1 tab(s) by mouth once a day (at bedtime)  -- Indication: For home med    pantoprazole 40 mg oral delayed release tablet  -- 1 tab(s) by mouth once a day (before a meal)  -- Indication: For GI protection home med    calcium-vitamin D 500 mg-200 intl units oral tablet  -- 1 tab(s) by mouth 3 times a day  -- Indication: For home med

## 2017-07-26 NOTE — DISCHARGE NOTE ADULT - HOSPITAL COURSE
The patient is a 62F status post elective left total knee arthroplasty after failing outpatient nonoperative conservative management.  Patient presented to [facility] after being medically cleared for an elective surgical procedure. The patient was taken to the operating room on date mentioned above. Prophylactic antibiotics were started before the procedure and continued for 24 hours.  There were no complications during the procedure and patient tolerated the procedure well.  The patient was transferred to recovery room in stable condition and subsequently to surgical floor.  Patient was placed on aspirin  for anticoagulation.  All home medications were continued.  The patient received physical therapy daily and daily labs were followed.  The dressing was kept clean, dry, and intact.  The rest of the hospital stay was unremarkable. H&P:  Pt is a62y Female PAST MEDICAL & SURGICAL HISTORY:  Chronic Pain Medication  MRSA (methicillin resistant staph aureus) culture positive: 2011  Sciatica of right side  Spinal stenosis, unspecified spinal region  Lumbar herniated disc  Scoliosis of thoracic spine, unspecified scoliosis type  Osteoarthritis of right shoulder, unspecified osteoarthritis type  Osteoarthritis of left knee, unspecified osteoarthritis type  Kidney cysts: benign  Kidney stone on right side  Diverticulosis of intestine without bleeding, unspecified intestinal tract location: Colon resection  Hernia of anterior abdominal wall  Gastroesophageal reflux disease, esophagitis presence not specified  Essential hypertension  Seasonal allergic rhinitis, unspecified allergic rhinitis trigger  S/P rotator cuff repair: right  Bilateral carpal tunnel syndrome: 2006, 2007  History of left knee surgery: x 6 Left Knee surgery -2013  History of colon resection: Colon Resection - 2005    Now s/p Left Total Knee Arthroplasty. Pt is afebrile with stable vital signs. Pain is controlled. Alert and Oriented. Exam reveals intact EHL FHL TA GS, +DP. Dressing is clean and dry with a New Aquacel bandage on.    Vital Signs Last 24 Hrs  T(C): 36.4 (07-28-17 @ 07:06), Max: 37 (07-27-17 @ 15:34)  T(F): 97.6 (07-28-17 @ 07:06), Max: 98.6 (07-27-17 @ 15:34)  HR: 83 (07-28-17 @ 07:06) (83 - 108)  BP: 112/61 (07-28-17 @ 07:06) (112/61 - 153/81)  BP(mean): --  RR: 17 (07-28-17 @ 07:06) (17 - 18)  SpO2: 95% (07-28-17 @ 07:06) (95% - 99%)                        9.7    11.6  )-----------( 273      ( 28 Jul 2017 06:51 )             29.2         Hospital Course:  Patient presented to [facility] after being medically cleared for an elective surgical procedure, having failed outpatient non-operative conservative management. Prophylactic antibiotics were started before the procedure and continued for 24 hours. They were admitted after surgery to the orthopedic floor.   There were no complications during the hospital stay. All home medications were continued.     Routine consults were obtained from the Anticoagulation Team for DVT/PE prophylaxis, from Physical Therapy for twice daily PT starting on POD 0, and from the Hospitalist for Medical Co-management. Patient was placed on ECASA 325 BID for anticoagulation.  Pertinent home medications were continued.  Daily labs were followed.  She did require higher than normal dose of narcotics for her post op pain management due to her  chronic narcotic dependence which is managed by her pain management doctor. her pain was able to be managed with oxycontin and oxy IR.    On POD 0 the pt was OOB with PT and there were no overnight events. POD1 the hemovac drain was removed. POD 2, PT was continued, and on POD 3 a new Aquacel dressing was applied. The pt is ready today for DC to home with home PT.  The orthopedic Attending is aware and agrees. She will resume her home pain management regimen, and additionally is prescribed Oxy IR 10mg for post op pain, as discussed with orthopedic Attending. She has an appt 8/4 with her pain management doctor.

## 2017-07-26 NOTE — CONSULT NOTE ADULT - ASSESSMENT
This is a 62 year old female s/p left total knee replacement with low risk thrombosis and low risk bleeding.    Discussed the risks vs benefits of full dose aspirin therapy with patient. Agrees with treatment and understands the necessity of therapy. Patient takes omeprazole regularly at home.    Plan:  ::Enteric coated ASA 325mg PO BID x 30 days  ::Pepcid 20 mg PO daily  ::Daily CBC/BMP  ::Venodynes  ::Enc ambulation    Thank you for this consult, will sign off.

## 2017-07-26 NOTE — DISCHARGE NOTE ADULT - PLAN OF CARE
Return to baseline ADLs 1.	ROM 0-full as tolerated, gently increase daily.  2.	Walking with full weight bearing as tolerated, with rolling walker  3.	DVT Prophylaxis per anticoagulation team recommendations  4.	PT Daily  5.	Follow up with Dr. Bowen as Outpatient in 10-14 Days after Discharge from the Hospital or Rehab. Call Office For Appointment.   6.	Remove Staples Post-Op Day 14. Remove old and place new Aquacel  Bandage Q7days until staples removed.   7.	Ice plenty  8.	OK to shower as long as Aquacel Bandage is used for wound. Discharge Instructions Total Hip Arthroplasty    1. Diet: Resume previous diet    2. Activity: WBAT. Rolling walker. Posterior Hip Dislocation Precautions. Abduction Pillow while in bed and Chair. Daily Physical Therapy.    3. Call with: fever over 101, wound redness, drainage or open area, calf pain/calf swelling.    4. Wound Care: Remove old and Place new Aquacel bandage to hip wound every 7days. Remove Sutures/Staples Post Op Day #14 so long as wound is healed, no drainage or open area. OK to Shower with Aquacel.  Avoid direct water beating on bandage.     5. DVT PE Prophylaxis: see med rec for details/dosing.  **Aspirin  BID x 30days OR     INR Daily until levels stable.  -Continue Pepcid while on Anticoagulant (Aspirin, Coumadin, Lovenox).    6. Labs: Check H&H weekly while on Anticoagulation    7. Follow Up: Orthopedics, 10-14 days in office. Call to schedule. If going home, eRX sent to your pharmacy for . Discharge Instructions for Total Knee Arthroplasty    1.  Diet: Resume previous diet    2. Activity: WBAT, Rolling walker, Daily PT. Gentle ROM 0-full as tolerated.     3. Call with: fever over 101, wound redness, drainage or open area, calf pain/calf swelling    4. Wound Care: Remove old and place new Aquacel  bandage to Knee every 7 days. Remove Sutures/Staples Post Op Day #14 (8/8/17) so long as wound is healed, no drainage or open area. OK to Shower with Aquacel.  Avoid direct water beating on bandage.  Continue ICE packs to knee.    5. DVT PE Prophylaxis:  -**Continue Aspirin for 30 days total.  -Continue Pepcid while on Anticoagulant (Aspirin).    6. Labs: Check H&H weekly while on Anticoagulation    7. Follow Up: Orthopedics, 10-14 days in office. Call to schedule. If going home, eRX sent to your pharmacy for . Discharge Instructions for Total Knee Arthroplasty    1.  Diet: Resume previous diet    2. Activity: WBAT, Rolling walker, Daily PT. Gentle ROM 0-full as tolerated.     3. Call with: fever over 101, wound redness, drainage or open area, calf pain/calf swelling    4. Wound Care: Remove old and place new Aquacel  bandage to Knee every 7 days. Remove Sutures/Staples Post Op Day #14 (8/8/17) so long as wound is healed, no drainage or open area. OK to Shower with Aquacel.  Avoid direct water beating on bandage.  Continue ICE packs to knee.    5. DVT PE Prophylaxis:  -Continue Aspirin for 30 days total.  -Continue Pepcid while on Anticoagulant (Aspirin).    6. Labs: Check H&H weekly while on Anticoagulation    7. Follow Up: Orthopedics, 10-14 days in office. Call to schedule. If going home, eRX sent to your pharmacy for .

## 2017-07-26 NOTE — DISCHARGE NOTE ADULT - INSTRUCTIONS
Do NOT take baby Aspirin 81 until you finish 30 days of full strength Aspirin 325.  After 30 days, can resume baby aspirin.

## 2017-07-26 NOTE — PROGRESS NOTE ADULT - SUBJECTIVE AND OBJECTIVE BOX
Orthopedics     POD 1 Total Knee Arthroplasty  Pain is  not controlled. Pt tearful. No nausea or vomiting. Has been OOB with PT to chair this AM. Per Dr. Bowen, may increase pain medication.    Vital Signs Last 24 Hrs  T(C): 36.7 (07-26-17 @ 07:24), Max: 36.8 (07-25-17 @ 22:11)  T(F): 98 (07-26-17 @ 07:24), Max: 98.2 (07-25-17 @ 22:11)  HR: 90 (07-26-17 @ 07:24) (71 - 90)  BP: 131/73 (07-26-17 @ 07:24) (94/55 - 144/70)  BP(mean): --  RR: 18 (07-26-17 @ 07:24) (14 - 21)  SpO2: 99% (07-26-17 @ 07:24) (96% - 100%)                        10.2   10.7  )-----------( 241      ( 26 Jul 2017 06:05 )             30.0     26 Jul 2017 06:05    142    |  110    |  8      ----------------------------<  147    4.6     |  28     |  0.72     Ca    8.4        26 Jul 2017 06:05      PT/INR - ( 26 Jul 2017 06:05 )   PT: 11.2 sec;   INR: 1.04 ratio         PTT - ( 26 Jul 2017 06:05 )  PTT:31.7 sec  Exam:  NAD AAOx3  Dressing clean and dry  +EHL FHL TA GS  SILT toes 1-5  +DP  Calf Soft NT    A/P:  Stable POD 1 Left Total Knee Arthroplasty  -Analgesia, increase dose frequency  -DVT PE ppx  -OOB PT  -Hemovac drain removed uneventfully  -Discussed with Dr. Bowen Orthopedics     POD 1 Total Knee Arthroplasty  Pain is  not controlled. Pt tearful. No nausea or vomiting. Has been OOB with PT to chair this AM. Per Dr. Bowen, may increase pain medication.    Vital Signs Last 24 Hrs  T(C): 36.7 (07-26-17 @ 07:24), Max: 36.8 (07-25-17 @ 22:11)  T(F): 98 (07-26-17 @ 07:24), Max: 98.2 (07-25-17 @ 22:11)  HR: 90 (07-26-17 @ 07:24) (71 - 90)  BP: 131/73 (07-26-17 @ 07:24) (94/55 - 144/70)  BP(mean): --  RR: 18 (07-26-17 @ 07:24) (14 - 21)  SpO2: 99% (07-26-17 @ 07:24) (96% - 100%)                        10.2   10.7  )-----------( 241      ( 26 Jul 2017 06:05 )             30.0     26 Jul 2017 06:05    142    |  110    |  8      ----------------------------<  147    4.6     |  28     |  0.72     Ca    8.4        26 Jul 2017 06:05      PT/INR - ( 26 Jul 2017 06:05 )   PT: 11.2 sec;   INR: 1.04 ratio         PTT - ( 26 Jul 2017 06:05 )  PTT:31.7 sec  Exam:  NAD AAOx3  Dressing clean and dry  +EHL FHL TA GS  SILT toes 1-5  +DP  Calf Soft NT    A/P:  Stable POD 1 Left Total Knee Arthroplasty  -Analgesia, increase frequency oxy IR 30mg to q3h prn, and Oxycontin to q8h.  -DVT PE ppx  -OOB PT  -Hemovac drain removed uneventfully  -I Discussed pain issues and above plan with Dr. Bowen Orthopedics     POD 1 Total Knee Arthroplasty  Pain is  not controlled. Pt tearful. No nausea or vomiting. Has been OOB with PT to chair this AM. Per Dr. Bowen, may increase pain medication.    Vital Signs Last 24 Hrs  T(C): 36.7 (07-26-17 @ 07:24), Max: 36.8 (07-25-17 @ 22:11)  T(F): 98 (07-26-17 @ 07:24), Max: 98.2 (07-25-17 @ 22:11)  HR: 90 (07-26-17 @ 07:24) (71 - 90)  BP: 131/73 (07-26-17 @ 07:24) (94/55 - 144/70)  BP(mean): --  RR: 18 (07-26-17 @ 07:24) (14 - 21)  SpO2: 99% (07-26-17 @ 07:24) (96% - 100%)                        10.2   10.7  )-----------( 241      ( 26 Jul 2017 06:05 )             30.0     26 Jul 2017 06:05    142    |  110    |  8      ----------------------------<  147    4.6     |  28     |  0.72     Ca    8.4        26 Jul 2017 06:05      PT/INR - ( 26 Jul 2017 06:05 )   PT: 11.2 sec;   INR: 1.04 ratio         PTT - ( 26 Jul 2017 06:05 )  PTT:31.7 sec  Exam:  NAD AAOx3  Dressing clean and dry  +EHL FHL TA GS  SILT toes 1-5  +DP  Calf Soft NT    A/P:  Stable POD 1 Left Total Knee Arthroplasty  -Analgesia, Post op pain in setting of chronic pain/narcotic tolerance: increase frequency oxy IR 30mg to q3h prn, and Oxycontin to q8h.  -DVT PE ppx  -OOB PT  -Hemovac drain removed uneventfully  -I Discussed pain issues and above plan with Dr. Bowen

## 2017-07-26 NOTE — DISCHARGE NOTE ADULT - CARE PROVIDER_API CALL
Bj Bowen), Orthopaedic Surgery  [address] [place] [address]  Phone: [phone]  Fax: [phone]

## 2017-07-26 NOTE — PROGRESS NOTE ADULT - SUBJECTIVE AND OBJECTIVE BOX
Patient seen and examined. Pain controlled. No acute events overnight    Vital Signs Last 24 Hrs  T(C): 36.7 (07-26-17 @ 03:44), Max: 36.8 (07-25-17 @ 22:11)  T(F): 98 (07-26-17 @ 03:44), Max: 98.2 (07-25-17 @ 22:11)  HR: 83 (07-26-17 @ 03:44) (71 - 86)  BP: 123/60 (07-26-17 @ 03:44) (94/55 - 146/68)  BP(mean): --  RR: 16 (07-26-17 @ 03:44) (14 - 21)  SpO2: 96% (07-26-17 @ 03:44) (96% - 100%)    Physical Exam  Gen: NAD  LLE:   Dressing c/d/i  +EHL/FHL/Gsc/TA  SILT L3-S1  DP +  Compartments soft  No calf ttp    A/P: 62y Female sp L TKA POD 1  Pain control  DVT ppx  PT/OT, WBAT  FU labs  Dispo planning  D/w attending

## 2017-07-26 NOTE — PROGRESS NOTE ADULT - SUBJECTIVE AND OBJECTIVE BOX
PCP: Dr. Arlene Meadows    Chief Complaint: Left knee pain    HPI: 62F, PMH Of HTN, HLD, diverticulosis, GERD, Lumbar herniated disc, OA Left knee, right shoulder, sciatica, spinal stenosis, who is admitted for elective total Left knee replacement. Hospitalist service consulted for medical comanagement.   Pt seen and examined. States she had some bleeding from surgical site. DRessing was redone. No dizziness or lightheadedness.    REVIEW OF SYSTEMS: all 10 systems reviewed and is as per HPI otherwise negative.   Vital Signs Last 24 Hrs  T(C): 36.7 (26 Jul 2017 07:24), Max: 36.8 (25 Jul 2017 22:11)  T(F): 98 (26 Jul 2017 07:24), Max: 98.2 (25 Jul 2017 22:11)  HR: 90 (26 Jul 2017 07:24) (71 - 90)  BP: 131/73 (26 Jul 2017 07:24) (105/72 - 131/73)  BP(mean): --  RR: 18 (26 Jul 2017 07:24) (16 - 18)  SpO2: 99% (26 Jul 2017 07:24) (96% - 99%)I&O's Summary    PHYSICAL EXAM:    Constitutional: NAD, awake and alert, well-developed  HEENT: PERR, EOMI, Normal Hearing, MMM  Neck: Soft and supple, No LAD, No JVD  Respiratory: Breath sounds are clear bilaterally, No wheezing, rales or rhonchi  Cardiovascular: S1 and S2, regular rate and rhythm, no Murmurs, gallops or rubs  Gastrointestinal: Bowel Sounds present, soft, nontender, nondistended, no guarding, no rebound  Extremities: No peripheral edema. Left leg in acewrap. +hemovac removed.  Vascular: 2+ peripheral pulses  Neurological: A/O x 3, no focal deficits  Musculoskeletal: NOn focal other than numbness LLE (s/p nerve block)  Skin: No rashes    Medications:  MEDICATIONS  (STANDING):  sodium chloride 0.9% lock flush 3 milliLiter(s) IV Push every 8 hours  acetaminophen   Tablet 650 milliGRAM(s) Oral every 6 hours  oxyCODONE  ER Tablet 10 milliGRAM(s) Oral every 12 hours  celecoxib 200 milliGRAM(s) Oral with breakfast  lactated ringers. 1000 milliLiter(s) (75 mL/Hr) IV Continuous <Continuous>  aspirin enteric coated 325 milliGRAM(s) Oral two times a day  calcium carbonate 1250 mG + Vitamin D (OsCal 500 + D) 1 Tablet(s) Oral three times a day  polyethylene glycol 3350 17 Gram(s) Oral daily  docusate sodium 100 milliGRAM(s) Oral three times a day  ferrous    sulfate 325 milliGRAM(s) Oral three times a day with meals  folic acid 1 milliGRAM(s) Oral daily  multivitamin 1 Tablet(s) Oral daily  ascorbic acid 500 milliGRAM(s) Oral two times a day  pantoprazole    Tablet 40 milliGRAM(s) Oral before breakfast  gabapentin 300 milliGRAM(s) Oral at bedtime  pregabalin 50 milliGRAM(s) Oral three times a day  amitriptyline 100 milliGRAM(s) Oral at bedtime  ATENolol  Tablet 25 milliGRAM(s) Oral at bedtime  metaxalone Oral Tab/Cap - Peds 800 milliGRAM(s) Oral every 8 hours  ceFAZolin   IVPB 2000 milliGRAM(s) IV Intermittent every 6 hours  oxyCODONE  ER Tablet 15 milliGRAM(s) Oral every 12 hours    LABS:                        10.2   10.7  )-----------( 241      ( 26 Jul 2017 06:05 )             30.0     07-26    142  |  110<H>  |  8   ----------------------------<  147<H>  4.6   |  28  |  0.72    Ca    8.4<L>      26 Jul 2017 06:05      PT/INR - ( 26 Jul 2017 06:05 )   PT: 11.2 sec;   INR: 1.04 ratio         PTT - ( 26 Jul 2017 06:05 )  PTT:31.7 sec        62f, PMH as above a/w:    1. Left knee OA s/p TKR:  -pod#1  -pain control-->pain meds adjusted earlier today  -physical therapy  -incentive spirometry    2. HTN:  -continue bb    3. HLD:  - not on statin    4. h/o spinal stenosis/sciatica:  -continue lyrica/elavil/muscle relaxants.    5. DVT px

## 2017-07-26 NOTE — DISCHARGE NOTE ADULT - MEDICATION SUMMARY - MEDICATIONS TO STOP TAKING
I will STOP taking the medications listed below when I get home from the hospital:  None I will STOP taking the medications listed below when I get home from the hospital:    aspirin 81 mg oral tablet  -- 1 tab(s) by mouth once a day

## 2017-07-27 LAB
ANION GAP SERPL CALC-SCNC: 5 MMOL/L — SIGNIFICANT CHANGE UP (ref 5–17)
BUN SERPL-MCNC: 6 MG/DL — LOW (ref 7–23)
CALCIUM SERPL-MCNC: 9.1 MG/DL — SIGNIFICANT CHANGE UP (ref 8.5–10.1)
CHLORIDE SERPL-SCNC: 107 MMOL/L — SIGNIFICANT CHANGE UP (ref 96–108)
CO2 SERPL-SCNC: 29 MMOL/L — SIGNIFICANT CHANGE UP (ref 22–31)
CREAT SERPL-MCNC: 0.61 MG/DL — SIGNIFICANT CHANGE UP (ref 0.5–1.3)
GLUCOSE SERPL-MCNC: 155 MG/DL — HIGH (ref 70–99)
HCT VFR BLD CALC: 30.3 % — LOW (ref 34.5–45)
HGB BLD-MCNC: 10.2 G/DL — LOW (ref 11.5–15.5)
MCHC RBC-ENTMCNC: 29.6 PG — SIGNIFICANT CHANGE UP (ref 27–34)
MCHC RBC-ENTMCNC: 33.8 GM/DL — SIGNIFICANT CHANGE UP (ref 32–36)
MCV RBC AUTO: 87.6 FL — SIGNIFICANT CHANGE UP (ref 80–100)
PLATELET # BLD AUTO: 251 K/UL — SIGNIFICANT CHANGE UP (ref 150–400)
POTASSIUM SERPL-MCNC: 4.3 MMOL/L — SIGNIFICANT CHANGE UP (ref 3.5–5.3)
POTASSIUM SERPL-SCNC: 4.3 MMOL/L — SIGNIFICANT CHANGE UP (ref 3.5–5.3)
RBC # BLD: 3.46 M/UL — LOW (ref 3.8–5.2)
RBC # FLD: 13.4 % — SIGNIFICANT CHANGE UP (ref 10.3–14.5)
SODIUM SERPL-SCNC: 141 MMOL/L — SIGNIFICANT CHANGE UP (ref 135–145)
SURGICAL PATHOLOGY FINAL REPORT - CH: SIGNIFICANT CHANGE UP
WBC # BLD: 10.5 K/UL — SIGNIFICANT CHANGE UP (ref 3.8–10.5)
WBC # FLD AUTO: 10.5 K/UL — SIGNIFICANT CHANGE UP (ref 3.8–10.5)

## 2017-07-27 RX ORDER — SENNA PLUS 8.6 MG/1
2 TABLET ORAL
Qty: 0 | Refills: 0 | COMMUNITY
Start: 2017-07-27

## 2017-07-27 RX ORDER — DOCUSATE SODIUM 100 MG
1 CAPSULE ORAL
Qty: 0 | Refills: 0 | COMMUNITY
Start: 2017-07-27

## 2017-07-27 RX ORDER — OXYCODONE HYDROCHLORIDE 5 MG/1
1 TABLET ORAL
Qty: 21 | Refills: 0 | OUTPATIENT
Start: 2017-07-27 | End: 2017-08-03

## 2017-07-27 RX ORDER — POLYETHYLENE GLYCOL 3350 17 G/17G
17 POWDER, FOR SOLUTION ORAL
Qty: 0 | Refills: 0 | COMMUNITY
Start: 2017-07-27

## 2017-07-27 RX ADMIN — OXYCODONE HYDROCHLORIDE 20 MILLIGRAM(S): 5 TABLET ORAL at 05:10

## 2017-07-27 RX ADMIN — OXYCODONE HYDROCHLORIDE 30 MILLIGRAM(S): 5 TABLET ORAL at 17:21

## 2017-07-27 RX ADMIN — Medication 325 MILLIGRAM(S): at 17:23

## 2017-07-27 RX ADMIN — HYDROMORPHONE HYDROCHLORIDE 2 MILLIGRAM(S): 2 INJECTION INTRAMUSCULAR; INTRAVENOUS; SUBCUTANEOUS at 19:56

## 2017-07-27 RX ADMIN — Medication 325 MILLIGRAM(S): at 05:09

## 2017-07-27 RX ADMIN — Medication 100 MILLIGRAM(S): at 21:50

## 2017-07-27 RX ADMIN — Medication 1 TABLET(S): at 05:09

## 2017-07-27 RX ADMIN — OXYCODONE HYDROCHLORIDE 20 MILLIGRAM(S): 5 TABLET ORAL at 14:23

## 2017-07-27 RX ADMIN — HYDROMORPHONE HYDROCHLORIDE 2 MILLIGRAM(S): 2 INJECTION INTRAMUSCULAR; INTRAVENOUS; SUBCUTANEOUS at 05:10

## 2017-07-27 RX ADMIN — Medication 650 MILLIGRAM(S): at 17:24

## 2017-07-27 RX ADMIN — Medication 1 TABLET(S): at 14:25

## 2017-07-27 RX ADMIN — Medication 1 TABLET(S): at 21:50

## 2017-07-27 RX ADMIN — GABAPENTIN 300 MILLIGRAM(S): 400 CAPSULE ORAL at 21:50

## 2017-07-27 RX ADMIN — Medication 650 MILLIGRAM(S): at 05:10

## 2017-07-27 RX ADMIN — Medication 50 MILLIGRAM(S): at 05:10

## 2017-07-27 RX ADMIN — Medication 325 MILLIGRAM(S): at 12:54

## 2017-07-27 RX ADMIN — OXYCODONE HYDROCHLORIDE 30 MILLIGRAM(S): 5 TABLET ORAL at 12:50

## 2017-07-27 RX ADMIN — Medication 500 MILLIGRAM(S): at 17:24

## 2017-07-27 RX ADMIN — ATENOLOL 25 MILLIGRAM(S): 25 TABLET ORAL at 21:50

## 2017-07-27 RX ADMIN — POLYETHYLENE GLYCOL 3350 17 GRAM(S): 17 POWDER, FOR SOLUTION ORAL at 12:53

## 2017-07-27 RX ADMIN — Medication 50 MILLIGRAM(S): at 21:50

## 2017-07-27 RX ADMIN — HYDROMORPHONE HYDROCHLORIDE 2 MILLIGRAM(S): 2 INJECTION INTRAMUSCULAR; INTRAVENOUS; SUBCUTANEOUS at 10:38

## 2017-07-27 RX ADMIN — OXYCODONE HYDROCHLORIDE 20 MILLIGRAM(S): 5 TABLET ORAL at 21:50

## 2017-07-27 RX ADMIN — SODIUM CHLORIDE 3 MILLILITER(S): 9 INJECTION INTRAMUSCULAR; INTRAVENOUS; SUBCUTANEOUS at 14:26

## 2017-07-27 RX ADMIN — OXYCODONE HYDROCHLORIDE 30 MILLIGRAM(S): 5 TABLET ORAL at 23:13

## 2017-07-27 RX ADMIN — Medication 650 MILLIGRAM(S): at 23:12

## 2017-07-27 RX ADMIN — Medication 500 MILLIGRAM(S): at 05:10

## 2017-07-27 RX ADMIN — PANTOPRAZOLE SODIUM 40 MILLIGRAM(S): 20 TABLET, DELAYED RELEASE ORAL at 05:14

## 2017-07-27 RX ADMIN — Medication 1 MILLIGRAM(S): at 12:56

## 2017-07-27 RX ADMIN — Medication 50 MILLIGRAM(S): at 14:24

## 2017-07-27 RX ADMIN — Medication 1 TABLET(S): at 12:53

## 2017-07-27 RX ADMIN — SODIUM CHLORIDE 3 MILLILITER(S): 9 INJECTION INTRAMUSCULAR; INTRAVENOUS; SUBCUTANEOUS at 05:14

## 2017-07-27 RX ADMIN — Medication 650 MILLIGRAM(S): at 12:52

## 2017-07-27 RX ADMIN — SODIUM CHLORIDE 3 MILLILITER(S): 9 INJECTION INTRAMUSCULAR; INTRAVENOUS; SUBCUTANEOUS at 21:51

## 2017-07-27 RX ADMIN — Medication 100 MILLIGRAM(S): at 05:10

## 2017-07-27 RX ADMIN — CELECOXIB 200 MILLIGRAM(S): 200 CAPSULE ORAL at 09:43

## 2017-07-27 RX ADMIN — Medication 325 MILLIGRAM(S): at 09:43

## 2017-07-27 NOTE — PROGRESS NOTE ADULT - SUBJECTIVE AND OBJECTIVE BOX
Patient seen and examined. Pain controlled.    Physical exam  VS: Afebrile, vital signs stable  Gen: NAD  Left LE: Dressing clean, dry, and intact. +EHL/FHL/TA/GSC. SILT L3-S1. +Dorsalis pedis, capillary refill brisk. Compartments soft and nontender.

## 2017-07-27 NOTE — PROGRESS NOTE ADULT - SUBJECTIVE AND OBJECTIVE BOX
PCP: Dr. Arlene Meadows    Chief Complaint: Left knee pain    HPI: 62F, PMH Of HTN, HLD, diverticulosis, GERD, Lumbar herniated disc, OA Left knee, right shoulder, sciatica, spinal stenosis, who is admitted for elective total Left knee replacement. Hospitalist service consulted for medical comanagement.     7/27: pt seen and examined. Pain controlled. No sob/chest pain    REVIEW OF SYSTEMS: all 10 systems reviewed and is as per HPI otherwise negative.   Vital Signs Last 24 Hrs  T(F): 98   HR: 90   BP: 131/73   RR: 18  SpO2: 99%      PHYSICAL EXAM:    Constitutional: NAD, awake and alert, well-developed  HEENT: PERR, EOMI, Normal Hearing, MMM  Neck: Soft and supple, No LAD, No JVD  Respiratory: Breath sounds are clear bilaterally, No wheezing, rales or rhonchi  Cardiovascular: S1 and S2, regular rate and rhythm, no Murmurs, gallops or rubs  Gastrointestinal: Bowel Sounds present, soft, nontender, nondistended, no guarding, no rebound  Extremities: No peripheral edema. Left leg in acewrap. +hemovac removed.  Vascular: 2+ peripheral pulses  Neurological: A/O x 3, no focal deficits  Musculoskeletal: NOn focal   Skin: No rashes    Medications:  MEDICATIONS  (STANDING):  sodium chloride 0.9% lock flush 3 milliLiter(s) IV Push every 8 hours  acetaminophen   Tablet 650 milliGRAM(s) Oral every 6 hours  oxyCODONE  ER Tablet 10 milliGRAM(s) Oral every 12 hours  celecoxib 200 milliGRAM(s) Oral with breakfast  lactated ringers. 1000 milliLiter(s) (75 mL/Hr) IV Continuous <Continuous>  aspirin enteric coated 325 milliGRAM(s) Oral two times a day  calcium carbonate 1250 mG + Vitamin D (OsCal 500 + D) 1 Tablet(s) Oral three times a day  polyethylene glycol 3350 17 Gram(s) Oral daily  docusate sodium 100 milliGRAM(s) Oral three times a day  ferrous    sulfate 325 milliGRAM(s) Oral three times a day with meals  folic acid 1 milliGRAM(s) Oral daily  multivitamin 1 Tablet(s) Oral daily  ascorbic acid 500 milliGRAM(s) Oral two times a day  pantoprazole    Tablet 40 milliGRAM(s) Oral before breakfast  gabapentin 300 milliGRAM(s) Oral at bedtime  pregabalin 50 milliGRAM(s) Oral three times a day  amitriptyline 100 milliGRAM(s) Oral at bedtime  ATENolol  Tablet 25 milliGRAM(s) Oral at bedtime  metaxalone Oral Tab/Cap - Peds 800 milliGRAM(s) Oral every 8 hours  ceFAZolin   IVPB 2000 milliGRAM(s) IV Intermittent every 6 hours  oxyCODONE  ER Tablet 15 milliGRAM(s) Oral every 12 hours    LABS:       reviewed      62f, PMH as above a/w:    1. Left knee OA s/p TKR:  -pod#2  -pain control  -physical therapy  -incentive spirometry    2. HTN:  -continue bb    3. HLD:  - not on statin    4. h/o spinal stenosis/sciatica:  -continue lyrica/elavil/muscle relaxants.    5. DVT px

## 2017-07-27 NOTE — PROGRESS NOTE ADULT - SUBJECTIVE AND OBJECTIVE BOX
Orthopedics    POD 2 Total Knee Arthroplasty   No nausea or vomiting. Has been OOB with PT.    Vital Signs Last 24 Hrs  T(C): 37.3 (07-26-17 @ 23:26), Max: 37.3 (07-26-17 @ 23:26)  T(F): 99.1 (07-26-17 @ 23:26), Max: 99.1 (07-26-17 @ 23:26)  HR: 91 (07-26-17 @ 23:26) (91 - 100)  BP: 127/63 (07-26-17 @ 23:26) (127/63 - 141/82)  BP(mean): --  RR: 18 (07-26-17 @ 23:26) (17 - 18)  SpO2: 100% (07-26-17 @ 23:26) (97% - 100%)                        10.2   10.5  )-----------( 251      ( 27 Jul 2017 06:10 )             30.3     27 Jul 2017 06:10    141    |  107    |  6      ----------------------------<  155    4.3     |  29     |  0.61     Ca    9.1        27 Jul 2017 06:10      PT/INR - ( 26 Jul 2017 06:05 )   PT: 11.2 sec;   INR: 1.04 ratio         PTT - ( 26 Jul 2017 06:05 )  PTT:31.7 sec  Exam:  NAD AAOx3, appears more comfortable than yesterday  Dressing clean and dry  +EHL FHL TA GS  SILT toes 1-5  +DP  Calf Soft NT    A/P:  Stable POD 2 Left Total Knee Arthroplasty  -Analgesia: Spoke with fatmata Ortiz to give Rx of Oxycodone IR for post op pain on DC, in addition to taking her chronic pain meds which pt already has at home. She states quantity is just enough until she follows up with her pain management doctor on 8/4/17.  -DVT PE ppx  -OOB PT

## 2017-07-28 VITALS
OXYGEN SATURATION: 95 % | HEART RATE: 83 BPM | TEMPERATURE: 98 F | SYSTOLIC BLOOD PRESSURE: 112 MMHG | RESPIRATION RATE: 17 BRPM | DIASTOLIC BLOOD PRESSURE: 61 MMHG

## 2017-07-28 LAB
ANION GAP SERPL CALC-SCNC: 5 MMOL/L — SIGNIFICANT CHANGE UP (ref 5–17)
BUN SERPL-MCNC: 7 MG/DL — SIGNIFICANT CHANGE UP (ref 7–23)
CALCIUM SERPL-MCNC: 9.2 MG/DL — SIGNIFICANT CHANGE UP (ref 8.5–10.1)
CHLORIDE SERPL-SCNC: 106 MMOL/L — SIGNIFICANT CHANGE UP (ref 96–108)
CO2 SERPL-SCNC: 29 MMOL/L — SIGNIFICANT CHANGE UP (ref 22–31)
CREAT SERPL-MCNC: 0.61 MG/DL — SIGNIFICANT CHANGE UP (ref 0.5–1.3)
GLUCOSE SERPL-MCNC: 144 MG/DL — HIGH (ref 70–99)
HCT VFR BLD CALC: 29.2 % — LOW (ref 34.5–45)
HGB BLD-MCNC: 9.7 G/DL — LOW (ref 11.5–15.5)
MCHC RBC-ENTMCNC: 29.1 PG — SIGNIFICANT CHANGE UP (ref 27–34)
MCHC RBC-ENTMCNC: 33.2 GM/DL — SIGNIFICANT CHANGE UP (ref 32–36)
MCV RBC AUTO: 87.8 FL — SIGNIFICANT CHANGE UP (ref 80–100)
PLATELET # BLD AUTO: 273 K/UL — SIGNIFICANT CHANGE UP (ref 150–400)
POTASSIUM SERPL-MCNC: 4.2 MMOL/L — SIGNIFICANT CHANGE UP (ref 3.5–5.3)
POTASSIUM SERPL-SCNC: 4.2 MMOL/L — SIGNIFICANT CHANGE UP (ref 3.5–5.3)
RBC # BLD: 3.32 M/UL — LOW (ref 3.8–5.2)
RBC # FLD: 12.9 % — SIGNIFICANT CHANGE UP (ref 10.3–14.5)
SODIUM SERPL-SCNC: 140 MMOL/L — SIGNIFICANT CHANGE UP (ref 135–145)
WBC # BLD: 11.6 K/UL — HIGH (ref 3.8–10.5)
WBC # FLD AUTO: 11.6 K/UL — HIGH (ref 3.8–10.5)

## 2017-07-28 RX ORDER — DOCUSATE SODIUM 100 MG
1 CAPSULE ORAL
Qty: 14 | Refills: 0 | OUTPATIENT
Start: 2017-07-28 | End: 2017-08-04

## 2017-07-28 RX ORDER — CHROMIUM PICOLINATE 200 MCG
1000 TABLET ORAL
Qty: 0 | Refills: 0 | COMMUNITY

## 2017-07-28 RX ORDER — MORPHINE SULFATE 50 MG/1
1 CAPSULE, EXTENDED RELEASE ORAL
Qty: 0 | Refills: 0 | COMMUNITY

## 2017-07-28 RX ORDER — ATENOLOL 25 MG/1
1 TABLET ORAL
Qty: 0 | Refills: 0 | COMMUNITY
Start: 2017-07-28

## 2017-07-28 RX ORDER — ACETAMINOPHEN 500 MG
2 TABLET ORAL
Qty: 0 | Refills: 0 | COMMUNITY

## 2017-07-28 RX ORDER — GABAPENTIN 400 MG/1
1 CAPSULE ORAL
Qty: 0 | Refills: 0 | COMMUNITY

## 2017-07-28 RX ORDER — GABAPENTIN 400 MG/1
1 CAPSULE ORAL
Qty: 0 | Refills: 0 | COMMUNITY
Start: 2017-07-28

## 2017-07-28 RX ORDER — OXYCODONE HYDROCHLORIDE 5 MG/1
1 TABLET ORAL
Qty: 21 | Refills: 0 | OUTPATIENT
Start: 2017-07-28

## 2017-07-28 RX ORDER — AMITRIPTYLINE HCL 25 MG
1 TABLET ORAL
Qty: 0 | Refills: 0 | COMMUNITY
Start: 2017-07-28

## 2017-07-28 RX ORDER — AMITRIPTYLINE HCL 25 MG
1 TABLET ORAL
Qty: 0 | Refills: 0 | COMMUNITY

## 2017-07-28 RX ORDER — ASPIRIN/CALCIUM CARB/MAGNESIUM 324 MG
1 TABLET ORAL
Qty: 60 | Refills: 0 | OUTPATIENT
Start: 2017-07-28 | End: 2017-08-27

## 2017-07-28 RX ORDER — OXYCODONE HYDROCHLORIDE 5 MG/1
1 TABLET ORAL
Qty: 0 | Refills: 0 | COMMUNITY

## 2017-07-28 RX ORDER — ASCORBIC ACID 60 MG
1 TABLET,CHEWABLE ORAL
Qty: 0 | Refills: 0 | COMMUNITY

## 2017-07-28 RX ORDER — ATENOLOL 25 MG/1
1 TABLET ORAL
Qty: 0 | Refills: 0 | COMMUNITY

## 2017-07-28 RX ORDER — PANTOPRAZOLE SODIUM 20 MG/1
1 TABLET, DELAYED RELEASE ORAL
Qty: 0 | Refills: 0 | COMMUNITY
Start: 2017-07-28

## 2017-07-28 RX ORDER — ASCORBIC ACID 60 MG
2 TABLET,CHEWABLE ORAL
Qty: 0 | Refills: 0 | COMMUNITY

## 2017-07-28 RX ORDER — ASPIRIN/CALCIUM CARB/MAGNESIUM 324 MG
1 TABLET ORAL
Qty: 0 | Refills: 0 | COMMUNITY
Start: 2017-07-28

## 2017-07-28 RX ORDER — TIZANIDINE 4 MG/1
2 TABLET ORAL
Qty: 0 | Refills: 0 | COMMUNITY

## 2017-07-28 RX ORDER — OMEPRAZOLE 10 MG/1
1 CAPSULE, DELAYED RELEASE ORAL
Qty: 0 | Refills: 0 | COMMUNITY

## 2017-07-28 RX ORDER — FAMOTIDINE 10 MG/ML
1 INJECTION INTRAVENOUS
Qty: 60 | Refills: 0 | OUTPATIENT
Start: 2017-07-28

## 2017-07-28 RX ORDER — OMEGA-3 ACID ETHYL ESTERS 1 G
1 CAPSULE ORAL
Qty: 0 | Refills: 0 | COMMUNITY

## 2017-07-28 RX ORDER — ASPIRIN/CALCIUM CARB/MAGNESIUM 324 MG
1 TABLET ORAL
Qty: 0 | Refills: 0 | COMMUNITY

## 2017-07-28 RX ADMIN — Medication 325 MILLIGRAM(S): at 09:39

## 2017-07-28 RX ADMIN — Medication 50 MILLIGRAM(S): at 13:33

## 2017-07-28 RX ADMIN — OXYCODONE HYDROCHLORIDE 20 MILLIGRAM(S): 5 TABLET ORAL at 06:19

## 2017-07-28 RX ADMIN — OXYCODONE HYDROCHLORIDE 30 MILLIGRAM(S): 5 TABLET ORAL at 11:51

## 2017-07-28 RX ADMIN — HYDROMORPHONE HYDROCHLORIDE 2 MILLIGRAM(S): 2 INJECTION INTRAMUSCULAR; INTRAVENOUS; SUBCUTANEOUS at 06:22

## 2017-07-28 RX ADMIN — Medication 1 TABLET(S): at 13:33

## 2017-07-28 RX ADMIN — SODIUM CHLORIDE 3 MILLILITER(S): 9 INJECTION INTRAMUSCULAR; INTRAVENOUS; SUBCUTANEOUS at 06:20

## 2017-07-28 RX ADMIN — OXYCODONE HYDROCHLORIDE 20 MILLIGRAM(S): 5 TABLET ORAL at 13:33

## 2017-07-28 RX ADMIN — CELECOXIB 200 MILLIGRAM(S): 200 CAPSULE ORAL at 09:40

## 2017-07-28 RX ADMIN — SODIUM CHLORIDE 3 MILLILITER(S): 9 INJECTION INTRAMUSCULAR; INTRAVENOUS; SUBCUTANEOUS at 13:33

## 2017-07-28 RX ADMIN — Medication 50 MILLIGRAM(S): at 06:19

## 2017-07-28 RX ADMIN — Medication 650 MILLIGRAM(S): at 06:17

## 2017-07-28 RX ADMIN — Medication 325 MILLIGRAM(S): at 11:50

## 2017-07-28 RX ADMIN — HYDROMORPHONE HYDROCHLORIDE 2 MILLIGRAM(S): 2 INJECTION INTRAMUSCULAR; INTRAVENOUS; SUBCUTANEOUS at 01:05

## 2017-07-28 RX ADMIN — Medication 650 MILLIGRAM(S): at 11:50

## 2017-07-28 RX ADMIN — Medication 1 MILLIGRAM(S): at 11:50

## 2017-07-28 RX ADMIN — PANTOPRAZOLE SODIUM 40 MILLIGRAM(S): 20 TABLET, DELAYED RELEASE ORAL at 06:19

## 2017-07-28 RX ADMIN — Medication 1 TABLET(S): at 11:50

## 2017-07-28 RX ADMIN — Medication 325 MILLIGRAM(S): at 06:18

## 2017-07-28 NOTE — PROGRESS NOTE ADULT - SUBJECTIVE AND OBJECTIVE BOX
Patient seen and examined. Pain controlled. No acute events overnight    Vital Signs Last 24 Hrs  T(C): 36.7 (07-27-17 @ 21:50), Max: 37 (07-27-17 @ 15:34)  T(F): 98.1 (07-27-17 @ 21:50), Max: 98.6 (07-27-17 @ 15:34)  HR: 108 (07-27-17 @ 21:50) (92 - 108)  BP: 153/81 (07-27-17 @ 21:50) (132/79 - 153/81)  BP(mean): --  RR: 18 (07-27-17 @ 21:50) (18 - 18)  SpO2: 99% (07-27-17 @ 21:50) (97% - 99%)    Physical Exam  Gen: NAD  LLE:   Dressing c/d/i  +EHL/FHL/Gsc/TA  SILT L3-S1  DP +  Compartments soft  No calf ttp    A/P: 62y Female sp L TKA POD 3  Pain control  DVT ppx  PT/OT, WBAT  FU labs  Dispo planning  D/w attending

## 2017-07-31 DIAGNOSIS — Z88.0 ALLERGY STATUS TO PENICILLIN: ICD-10-CM

## 2017-07-31 DIAGNOSIS — K21.9 GASTRO-ESOPHAGEAL REFLUX DISEASE WITHOUT ESOPHAGITIS: ICD-10-CM

## 2017-07-31 DIAGNOSIS — M48.00 SPINAL STENOSIS, SITE UNSPECIFIED: ICD-10-CM

## 2017-07-31 DIAGNOSIS — M51.36 OTHER INTERVERTEBRAL DISC DEGENERATION, LUMBAR REGION: ICD-10-CM

## 2017-07-31 DIAGNOSIS — M17.12 UNILATERAL PRIMARY OSTEOARTHRITIS, LEFT KNEE: ICD-10-CM

## 2017-07-31 DIAGNOSIS — I10 ESSENTIAL (PRIMARY) HYPERTENSION: ICD-10-CM

## 2017-07-31 DIAGNOSIS — Z79.82 LONG TERM (CURRENT) USE OF ASPIRIN: ICD-10-CM

## 2017-07-31 DIAGNOSIS — Z87.891 PERSONAL HISTORY OF NICOTINE DEPENDENCE: ICD-10-CM

## 2017-07-31 DIAGNOSIS — E78.5 HYPERLIPIDEMIA, UNSPECIFIED: ICD-10-CM

## 2017-09-20 ENCOUNTER — INPATIENT (INPATIENT)
Facility: HOSPITAL | Age: 62
LOS: 3 days | Discharge: HOME | End: 2017-09-24
Attending: INTERNAL MEDICINE | Admitting: INTERNAL MEDICINE
Payer: MEDICARE

## 2017-09-20 VITALS — HEIGHT: 62 IN | WEIGHT: 175.05 LBS

## 2017-09-20 DIAGNOSIS — Z90.49 ACQUIRED ABSENCE OF OTHER SPECIFIED PARTS OF DIGESTIVE TRACT: Chronic | ICD-10-CM

## 2017-09-20 DIAGNOSIS — Z98.890 OTHER SPECIFIED POSTPROCEDURAL STATES: Chronic | ICD-10-CM

## 2017-09-20 DIAGNOSIS — G56.03 CARPAL TUNNEL SYNDROME, BILATERAL UPPER LIMBS: Chronic | ICD-10-CM

## 2017-09-20 LAB
ALBUMIN SERPL ELPH-MCNC: 3.1 G/DL — LOW (ref 3.3–5)
ALP SERPL-CCNC: 83 U/L — SIGNIFICANT CHANGE UP (ref 40–120)
ALT FLD-CCNC: 29 U/L — SIGNIFICANT CHANGE UP (ref 12–78)
ANION GAP SERPL CALC-SCNC: 2 MMOL/L — LOW (ref 5–17)
ANISOCYTOSIS BLD QL: SLIGHT — SIGNIFICANT CHANGE UP
AST SERPL-CCNC: 21 U/L — SIGNIFICANT CHANGE UP (ref 15–37)
BASOPHILS # BLD AUTO: 0.1 K/UL — SIGNIFICANT CHANGE UP (ref 0–0.2)
BILIRUB SERPL-MCNC: 0.4 MG/DL — SIGNIFICANT CHANGE UP (ref 0.2–1.2)
BUN SERPL-MCNC: 17 MG/DL — SIGNIFICANT CHANGE UP (ref 7–23)
CALCIUM SERPL-MCNC: 9 MG/DL — SIGNIFICANT CHANGE UP (ref 8.5–10.1)
CHLORIDE SERPL-SCNC: 99 MMOL/L — SIGNIFICANT CHANGE UP (ref 96–108)
CO2 SERPL-SCNC: 30 MMOL/L — SIGNIFICANT CHANGE UP (ref 22–31)
CREAT SERPL-MCNC: 1.1 MG/DL — SIGNIFICANT CHANGE UP (ref 0.5–1.3)
EOSINOPHIL # BLD AUTO: 0.4 K/UL — SIGNIFICANT CHANGE UP (ref 0–0.5)
EOSINOPHIL NFR BLD AUTO: 2 % — SIGNIFICANT CHANGE UP (ref 0–6)
GLUCOSE SERPL-MCNC: 147 MG/DL — HIGH (ref 70–99)
HCT VFR BLD CALC: 33 % — LOW (ref 34.5–45)
HGB BLD-MCNC: 10.9 G/DL — LOW (ref 11.5–15.5)
HYPOCHROMIA BLD QL: SLIGHT — SIGNIFICANT CHANGE UP
LACTATE SERPL-SCNC: 0.8 MMOL/L — SIGNIFICANT CHANGE UP (ref 0.7–2)
LYMPHOCYTES # BLD AUTO: 3.2 K/UL — SIGNIFICANT CHANGE UP (ref 1–3.3)
LYMPHOCYTES # BLD AUTO: 32 % — SIGNIFICANT CHANGE UP (ref 13–44)
MANUAL DIF COMMENT BLD-IMP: SIGNIFICANT CHANGE UP
MCHC RBC-ENTMCNC: 29.4 PG — SIGNIFICANT CHANGE UP (ref 27–34)
MCHC RBC-ENTMCNC: 33 GM/DL — SIGNIFICANT CHANGE UP (ref 32–36)
MCV RBC AUTO: 89.2 FL — SIGNIFICANT CHANGE UP (ref 80–100)
MONOCYTES # BLD AUTO: 1.1 K/UL — HIGH (ref 0–0.9)
MONOCYTES NFR BLD AUTO: 4 % — SIGNIFICANT CHANGE UP (ref 2–14)
NEUTROPHILS # BLD AUTO: 14.2 K/UL — HIGH (ref 1.8–7.4)
NEUTROPHILS NFR BLD AUTO: 62 % — SIGNIFICANT CHANGE UP (ref 43–77)
NT-PROBNP SERPL-SCNC: 316 PG/ML — HIGH (ref 0–125)
PLAT MORPH BLD: NORMAL — SIGNIFICANT CHANGE UP
PLATELET # BLD AUTO: 254 K/UL — SIGNIFICANT CHANGE UP (ref 150–400)
POIKILOCYTOSIS BLD QL AUTO: SLIGHT — SIGNIFICANT CHANGE UP
POLYCHROMASIA BLD QL SMEAR: SLIGHT — SIGNIFICANT CHANGE UP
POTASSIUM SERPL-MCNC: 3.6 MMOL/L — SIGNIFICANT CHANGE UP (ref 3.5–5.3)
POTASSIUM SERPL-SCNC: 3.6 MMOL/L — SIGNIFICANT CHANGE UP (ref 3.5–5.3)
PROT SERPL-MCNC: 7 GM/DL — SIGNIFICANT CHANGE UP (ref 6–8.3)
RBC # BLD: 3.7 M/UL — LOW (ref 3.8–5.2)
RBC # FLD: 13.2 % — SIGNIFICANT CHANGE UP (ref 10.3–14.5)
RBC BLD AUTO: SIGNIFICANT CHANGE UP
SODIUM SERPL-SCNC: 131 MMOL/L — LOW (ref 135–145)
WBC # BLD: 19 K/UL — HIGH (ref 3.8–10.5)
WBC # FLD AUTO: 19 K/UL — HIGH (ref 3.8–10.5)

## 2017-09-20 PROCEDURE — 99285 EMERGENCY DEPT VISIT HI MDM: CPT

## 2017-09-20 PROCEDURE — 71010: CPT | Mod: 26

## 2017-09-20 PROCEDURE — 93010 ELECTROCARDIOGRAM REPORT: CPT

## 2017-09-20 RX ORDER — AZITHROMYCIN 500 MG/1
500 TABLET, FILM COATED ORAL ONCE
Qty: 0 | Refills: 0 | Status: COMPLETED | OUTPATIENT
Start: 2017-09-20 | End: 2017-09-20

## 2017-09-20 RX ORDER — ASCORBIC ACID 60 MG
1 TABLET,CHEWABLE ORAL
Qty: 0 | Refills: 0 | COMMUNITY

## 2017-09-20 RX ORDER — MORPHINE SULFATE 50 MG/1
4 CAPSULE, EXTENDED RELEASE ORAL ONCE
Qty: 0 | Refills: 0 | Status: DISCONTINUED | OUTPATIENT
Start: 2017-09-20 | End: 2017-09-20

## 2017-09-20 RX ORDER — IBUPROFEN 200 MG
2 TABLET ORAL
Qty: 0 | Refills: 0 | COMMUNITY

## 2017-09-20 RX ORDER — CEFTRIAXONE 500 MG/1
1 INJECTION, POWDER, FOR SOLUTION INTRAMUSCULAR; INTRAVENOUS ONCE
Qty: 0 | Refills: 0 | Status: COMPLETED | OUTPATIENT
Start: 2017-09-20 | End: 2017-09-20

## 2017-09-20 RX ORDER — POLYETHYLENE GLYCOL 3350 17 G/17G
17 POWDER, FOR SOLUTION ORAL
Qty: 0 | Refills: 0 | COMMUNITY

## 2017-09-20 RX ORDER — DOCUSATE SODIUM 100 MG
100 CAPSULE ORAL
Qty: 0 | Refills: 0 | Status: DISCONTINUED | OUTPATIENT
Start: 2017-09-20 | End: 2017-09-24

## 2017-09-20 RX ORDER — GABAPENTIN 400 MG/1
300 CAPSULE ORAL AT BEDTIME
Qty: 0 | Refills: 0 | Status: DISCONTINUED | OUTPATIENT
Start: 2017-09-20 | End: 2017-09-24

## 2017-09-20 RX ORDER — AMITRIPTYLINE HCL 25 MG
100 TABLET ORAL AT BEDTIME
Qty: 0 | Refills: 0 | Status: DISCONTINUED | OUTPATIENT
Start: 2017-09-20 | End: 2017-09-24

## 2017-09-20 RX ORDER — AZITHROMYCIN 500 MG/1
TABLET, FILM COATED ORAL
Qty: 0 | Refills: 0 | Status: DISCONTINUED | OUTPATIENT
Start: 2017-09-20 | End: 2017-09-23

## 2017-09-20 RX ORDER — CEFTRIAXONE 500 MG/1
INJECTION, POWDER, FOR SOLUTION INTRAMUSCULAR; INTRAVENOUS
Qty: 0 | Refills: 0 | Status: DISCONTINUED | OUTPATIENT
Start: 2017-09-20 | End: 2017-09-23

## 2017-09-20 RX ORDER — ONDANSETRON 8 MG/1
4 TABLET, FILM COATED ORAL ONCE
Qty: 0 | Refills: 0 | Status: COMPLETED | OUTPATIENT
Start: 2017-09-20 | End: 2017-09-20

## 2017-09-20 RX ORDER — OXYCODONE HYDROCHLORIDE 5 MG/1
1 TABLET ORAL
Qty: 0 | Refills: 0 | COMMUNITY

## 2017-09-20 RX ORDER — SENNA PLUS 8.6 MG/1
2 TABLET ORAL AT BEDTIME
Qty: 0 | Refills: 0 | Status: DISCONTINUED | OUTPATIENT
Start: 2017-09-20 | End: 2017-09-24

## 2017-09-20 RX ORDER — SODIUM CHLORIDE 9 MG/ML
2000 INJECTION INTRAMUSCULAR; INTRAVENOUS; SUBCUTANEOUS ONCE
Qty: 0 | Refills: 0 | Status: DISCONTINUED | OUTPATIENT
Start: 2017-09-20 | End: 2017-09-20

## 2017-09-20 RX ORDER — GUAIFENESIN/DEXTROMETHORPHAN 600MG-30MG
1 TABLET, EXTENDED RELEASE 12 HR ORAL
Qty: 0 | Refills: 0 | COMMUNITY

## 2017-09-20 RX ORDER — ENOXAPARIN SODIUM 100 MG/ML
40 INJECTION SUBCUTANEOUS EVERY 24 HOURS
Qty: 0 | Refills: 0 | Status: DISCONTINUED | OUTPATIENT
Start: 2017-09-20 | End: 2017-09-24

## 2017-09-20 RX ORDER — CEFTRIAXONE 500 MG/1
1 INJECTION, POWDER, FOR SOLUTION INTRAMUSCULAR; INTRAVENOUS EVERY 24 HOURS
Qty: 0 | Refills: 0 | Status: DISCONTINUED | OUTPATIENT
Start: 2017-09-21 | End: 2017-09-23

## 2017-09-20 RX ORDER — MORPHINE SULFATE 50 MG/1
30 CAPSULE, EXTENDED RELEASE ORAL
Qty: 0 | Refills: 0 | COMMUNITY

## 2017-09-20 RX ORDER — OMEGA-3 ACID ETHYL ESTERS 1 G
0 CAPSULE ORAL
Qty: 0 | Refills: 0 | COMMUNITY

## 2017-09-20 RX ORDER — IPRATROPIUM/ALBUTEROL SULFATE 18-103MCG
3 AEROSOL WITH ADAPTER (GRAM) INHALATION
Qty: 0 | Refills: 0 | Status: DISCONTINUED | OUTPATIENT
Start: 2017-09-20 | End: 2017-09-24

## 2017-09-20 RX ORDER — ACETAMINOPHEN 500 MG
650 TABLET ORAL EVERY 6 HOURS
Qty: 0 | Refills: 0 | Status: DISCONTINUED | OUTPATIENT
Start: 2017-09-20 | End: 2017-09-24

## 2017-09-20 RX ORDER — AZITHROMYCIN 500 MG/1
500 TABLET, FILM COATED ORAL EVERY 24 HOURS
Qty: 0 | Refills: 0 | Status: DISCONTINUED | OUTPATIENT
Start: 2017-09-21 | End: 2017-09-23

## 2017-09-20 RX ORDER — DIPHENHYDRAMINE HCL 50 MG
1 CAPSULE ORAL
Qty: 0 | Refills: 0 | COMMUNITY

## 2017-09-20 RX ORDER — BUDESONIDE AND FORMOTEROL FUMARATE DIHYDRATE 160; 4.5 UG/1; UG/1
2 AEROSOL RESPIRATORY (INHALATION)
Qty: 0 | Refills: 0 | Status: DISCONTINUED | OUTPATIENT
Start: 2017-09-20 | End: 2017-09-24

## 2017-09-20 RX ORDER — ATENOLOL 25 MG/1
25 TABLET ORAL AT BEDTIME
Qty: 0 | Refills: 0 | Status: DISCONTINUED | OUTPATIENT
Start: 2017-09-20 | End: 2017-09-24

## 2017-09-20 RX ORDER — MORPHINE SULFATE 50 MG/1
30 CAPSULE, EXTENDED RELEASE ORAL EVERY 12 HOURS
Qty: 0 | Refills: 0 | Status: DISCONTINUED | OUTPATIENT
Start: 2017-09-20 | End: 2017-09-24

## 2017-09-20 RX ORDER — SODIUM CHLORIDE 9 MG/ML
1000 INJECTION INTRAMUSCULAR; INTRAVENOUS; SUBCUTANEOUS ONCE
Qty: 0 | Refills: 0 | Status: COMPLETED | OUTPATIENT
Start: 2017-09-20 | End: 2017-09-20

## 2017-09-20 RX ORDER — IPRATROPIUM/ALBUTEROL SULFATE 18-103MCG
3 AEROSOL WITH ADAPTER (GRAM) INHALATION
Qty: 0 | Refills: 0 | Status: COMPLETED | OUTPATIENT
Start: 2017-09-20 | End: 2017-09-20

## 2017-09-20 RX ORDER — SODIUM CHLORIDE 9 MG/ML
1000 INJECTION INTRAMUSCULAR; INTRAVENOUS; SUBCUTANEOUS
Qty: 0 | Refills: 0 | Status: DISCONTINUED | OUTPATIENT
Start: 2017-09-20 | End: 2017-09-21

## 2017-09-20 RX ORDER — ASPIRIN/CALCIUM CARB/MAGNESIUM 324 MG
81 TABLET ORAL DAILY
Qty: 0 | Refills: 0 | Status: DISCONTINUED | OUTPATIENT
Start: 2017-09-20 | End: 2017-09-24

## 2017-09-20 RX ORDER — TIZANIDINE 4 MG/1
1 TABLET ORAL
Qty: 0 | Refills: 0 | COMMUNITY

## 2017-09-20 RX ADMIN — MORPHINE SULFATE 4 MILLIGRAM(S): 50 CAPSULE, EXTENDED RELEASE ORAL at 17:55

## 2017-09-20 RX ADMIN — SODIUM CHLORIDE 100 MILLILITER(S): 9 INJECTION INTRAMUSCULAR; INTRAVENOUS; SUBCUTANEOUS at 23:24

## 2017-09-20 RX ADMIN — Medication 125 MILLIGRAM(S): at 15:54

## 2017-09-20 RX ADMIN — ATENOLOL 25 MILLIGRAM(S): 25 TABLET ORAL at 23:25

## 2017-09-20 RX ADMIN — ONDANSETRON 4 MILLIGRAM(S): 8 TABLET, FILM COATED ORAL at 17:25

## 2017-09-20 RX ADMIN — ENOXAPARIN SODIUM 40 MILLIGRAM(S): 100 INJECTION SUBCUTANEOUS at 22:50

## 2017-09-20 RX ADMIN — MORPHINE SULFATE 30 MILLIGRAM(S): 50 CAPSULE, EXTENDED RELEASE ORAL at 23:24

## 2017-09-20 RX ADMIN — Medication 3 MILLILITER(S): at 15:56

## 2017-09-20 RX ADMIN — Medication 40 MILLIGRAM(S): at 22:50

## 2017-09-20 RX ADMIN — Medication 100 MILLIGRAM(S): at 23:25

## 2017-09-20 RX ADMIN — CEFTRIAXONE 100 GRAM(S): 500 INJECTION, POWDER, FOR SOLUTION INTRAMUSCULAR; INTRAVENOUS at 21:53

## 2017-09-20 RX ADMIN — SODIUM CHLORIDE 1000 MILLILITER(S): 9 INJECTION INTRAMUSCULAR; INTRAVENOUS; SUBCUTANEOUS at 19:38

## 2017-09-20 RX ADMIN — SODIUM CHLORIDE 1000 MILLILITER(S): 9 INJECTION INTRAMUSCULAR; INTRAVENOUS; SUBCUTANEOUS at 15:54

## 2017-09-20 RX ADMIN — GABAPENTIN 300 MILLIGRAM(S): 400 CAPSULE ORAL at 23:25

## 2017-09-20 RX ADMIN — Medication 75 MILLIGRAM(S): at 23:24

## 2017-09-20 RX ADMIN — Medication 3 MILLILITER(S): at 15:54

## 2017-09-20 RX ADMIN — AZITHROMYCIN 255 MILLIGRAM(S): 500 TABLET, FILM COATED ORAL at 23:24

## 2017-09-20 RX ADMIN — Medication 3 MILLILITER(S): at 17:34

## 2017-09-20 RX ADMIN — MORPHINE SULFATE 4 MILLIGRAM(S): 50 CAPSULE, EXTENDED RELEASE ORAL at 17:25

## 2017-09-20 NOTE — ED PROVIDER NOTE - OBJECTIVE STATEMENT
62 y-o Female with PMHx of HTN, presents to the ED c/o productive cough. Pt states she went to PMD for cough x 1.5 weeks, was given steroids finished 7 days ago. Pt states x2days ago productive cough returned with fever. Hx of Hospitalization for PNA last April. Denies upper back pain, CP, N/V/D.

## 2017-09-20 NOTE — H&P ADULT - ASSESSMENT
62/F admitted with     1) RLL PNA + COPD: community acquired  admit to med floor  iv rocephin and zithro  iv solumedrol  duonebs  symbicort  pulmo consult  f/u blood cx  CT if does improve     2) HTN:  cont atenolol    3) Hyponatremia:  cont iv fluids  recheck in am    4) Leukocytosis: sec to PNA and steroid use  cont iv ABX  f/u cbc    5) DVT PPX:  lovenox s/q    6) Left Mid lung scar on CT chest of May 2017: CT chest in another 2 months ( 6 months from previous one)    poc discussed with pt, team 62/F admitted with     1) RLL PNA + COPD: community acquired  admit to med floor  iv rocephin and zithro  iv solumedrol  duonebs  symbicort  pulmo consult  f/u blood cx  CT chest if does not improve     2) HTN:  cont atenolol    3) Hyponatremia:  cont iv fluids  recheck in am    4) Leukocytosis: sec to PNA and steroid use  cont iv ABX  f/u cbc    5) DVT PPX:  lovenox s/q    6) Left Mid lung scar on CT chest of May 2017: CT chest in another 2 months ( 6 months from previous one)    poc discussed with pt, team

## 2017-09-20 NOTE — H&P ADULT - HISTORY OF PRESENT ILLNESS
62 y-o Female with PMHx of HTN, presents to the ED c/o productive cough. Pt states she went to PMD for cough x 1.5 weeks, was given steroids finished 7 days ago. Pt states x2days ago productive cough returned with fever. Hx of Hospitalization for PNA last April. Denies upper back pain, CP, N/V/D

## 2017-09-20 NOTE — ED ADULT NURSE REASSESSMENT NOTE - NS ED NURSE REASSESS COMMENT FT1
Report given to 2sw. Attempted to call resident line for pain medications for pt. Resident was in the middle of a code blue. Pt made aware. Pt being transported to 2sw at this time.

## 2017-09-20 NOTE — ED ADULT NURSE NOTE - OBJECTIVE STATEMENT
pt reports to ED complaining of weakness and cough. pt states that she "did not feel well" and initially made appointment with Dr Meadows for Friday, but did not feel well enough to wait til then, so she was able to be seen at 1300. patient reports that at Dr Meadows's office her blood pressure was low and she was "seeing pink spots" so she was sent to the ED. pt reports to ED complaining of weakness and cough. pt states that she "did not feel well" and initially made appointment with Dr Meadows for Friday, but did not feel well enough to wait til then, so she was able to be seen at 1300. patient reports that at Dr Meadows's office her blood pressure was low and she was "seeing pink spots" so she was sent to the ED. Patient subsequently reports that she has chronic back pain for which she sees pain management.

## 2017-09-20 NOTE — H&P ADULT - NSHPPHYSICALEXAM_GEN_ALL_CORE
PHYSICAL EXAM:    Daily Height in cm: 157.48 (20 Sep 2017 14:01)    Daily     ICU Vital Signs Last 24 Hrs  T(C): 36.5 (20 Sep 2017 14:11), Max: 36.5 (20 Sep 2017 14:11)  T(F): 97.7 (20 Sep 2017 14:11), Max: 97.7 (20 Sep 2017 14:11)  HR: 80 (20 Sep 2017 18:00) (61 - 80)  BP: 112/71 (20 Sep 2017 18:00) (94/50 - 112/71)  BP(mean): --  ABP: --  ABP(mean): --  RR: 16 (20 Sep 2017 18:00) (16 - 16)  SpO2: 100% (20 Sep 2017 18:00) (96% - 100%)      Constitutional: Well appearing  HEENT: Atraumatic, LAURA, Normal, No congestion  Respiratory: Breath Sounds normal, b/l rhonchi/wheeze  Cardiovascular: N S1S2; MARY present  Gastrointestinal: Abdomen soft, non tender, Bowel Sounds present  Extremities: No edema, peripheral pulses present  Neurological: AAO x 3, no gross focal motor deficits  Skin: Non cellulitic, no rash, ulcers  Lymph Nodes: No lymphadenopathy noted  Back: No CVA tenderness   Musculoskeletal: non tender  Breasts: Deferred  Genitourinary: deferred  Rectal: Deferred

## 2017-09-20 NOTE — H&P ADULT - NSHPLABSRESULTS_GEN_ALL_CORE
10.9   19.0  )-----------( 254      ( 20 Sep 2017 15:15 )             33.0       CBC Full  -  ( 20 Sep 2017 15:15 )  WBC Count : 19.0 K/uL  Hemoglobin : 10.9 g/dL  Hematocrit : 33.0 %  Platelet Count - Automated : 254 K/uL  Mean Cell Volume : 89.2 fl  Mean Cell Hemoglobin : 29.4 pg  Mean Cell Hemoglobin Concentration : 33.0 gm/dL  Auto Neutrophil # : 14.2 K/uL  Auto Lymphocyte # : 3.2 K/uL  Auto Monocyte # : 1.1 K/uL  Auto Eosinophil # : 0.4 K/uL  Auto Basophil # : 0.1 K/uL  Auto Neutrophil % : 62.0 %  Auto Lymphocyte % : 32.0 %  Auto Monocyte % : 4.0 %  Auto Eosinophil % : 2.0 %  Auto Basophil % : x      09-20    131<L>  |  99  |  17  ----------------------------<  147<H>  3.6   |  30  |  1.10    Ca    9.0      20 Sep 2017 15:15    TPro  7.0  /  Alb  3.1<L>  /  TBili  0.4  /  DBili  x   /  AST  21  /  ALT  29  /  AlkPhos  83  09-20      LIVER FUNCTIONS - ( 20 Sep 2017 15:15 )  Alb: 3.1 g/dL / Pro: 7.0 gm/dL / ALK PHOS: 83 U/L / ALT: 29 U/L / AST: 21 U/L / GGT: x                               MEDICATIONS  (STANDING):  enoxaparin Injectable 40 milliGRAM(s) SubCutaneous every 24 hours  sodium chloride 0.9%. 1000 milliLiter(s) (100 mL/Hr) IV Continuous <Continuous>  cefTRIAXone   IVPB      azithromycin  IVPB      methylPREDNISolone sodium succinate Injectable 40 milliGRAM(s) IV Push every 8 hours  ALBUTerol/ipratropium for Nebulization 3 milliLiter(s) Nebulizer four times a day  buDESOnide 160 MICROgram(s)/formoterol 4.5 MICROgram(s) Inhaler 2 Puff(s) Inhalation two times a day  docusate sodium 100 milliGRAM(s) Oral two times a day  senna 2 Tablet(s) Oral at bedtime  aspirin  chewable 81 milliGRAM(s) Oral daily  ATENolol  Tablet 25 milliGRAM(s) Oral at bedtime

## 2017-09-20 NOTE — ED STATDOCS - OBJECTIVE STATEMENT
61 y/o female with PMHx of HTN, GERD, renal calculi, OA, spinal stenosis, diverticulosis s/p colon resection presents to the ED c/o cough, producing green sputum, worsening.  Finished course of steroids 1 week ago.  She notes that she has had very low blood pressure recently.  She has been having intermittent fevers, currently afebrile here in the ED.  She has had PNA in the past.  Denies hx of asthma, emphysema.  Will transfer pt to Main ED for further eval

## 2017-09-20 NOTE — ED STATDOCS - CPE ED EYE NORM
"              After Visit Summary   1/17/2017    Casa Isbell    MRN: [id number]           Patient Information     Date Of Birth          1966        Visit Information        Provider Department      1/17/2017 3:20 PM Juliet Eaton DO [facility]        Today's Diagnoses     Intractable chronic migraine without aura and without status migrainosus    -  1     Moderate persistent asthma without complication         Mild intermittent asthma without complication         Acute nasopharyngitis            Follow-ups after your visit        Your next 10 appointments already scheduled     Jan 31, 2017  3:00 PM   PHYSICAL with Juliet Eaton DO   [facility] ([facility]    [address] [place] [id number] [phone]              Who to contact     If you have questions or need follow up information about today's clinic visit or your schedule please contact [facility] directly at [phone].  Normal or non-critical lab and imaging results will be communicated to you by MyChart, letter or phone within 4 business days after the clinic has received the results. If you do not hear from us within 7 days, please contact the clinic through [employer] or phone. If you have a critical or abnormal lab result, we will notify you by phone as soon as possible.  Submit refill requests through [employer] or call your pharmacy and they will forward the refill request to us. Please allow 3 business days for your refill to be completed.          Additional Information About Your Visit        MyChart Information     [employer] lets you send messages to your doctor, view your test results, renew your prescriptions, schedule appointments and more. To sign up, go to [email or web address] . Click on \"Log in\" on the left side of the screen, which will take you to the Welcome page. Then click on \"Sign up Now\" on the right side of the page.     You " "will be asked to enter the access code listed below, as well as some personal information. Please follow the directions to create your username and password.     Your access code is: [email or web address]  Expires: 2017  3:55 PM     Your access code will  in 90 days. If you need help or a new code, please call your [facility] clinic or [phone].        Care EveryWhere ID     This is your Care EveryWhere ID. This could be used by other organizations to access your [facility] medical records  [id number]        Your Vitals Were     Pulse Temperature Height BMI (Body Mass Index) Pulse Oximetry       92 98.7  F (37.1  C) (Oral) 5' 3.58\" (1.615 m) 27.30 kg/m2 98%        Blood Pressure from Last 3 Encounters:   17 108/64   16 114/72   10/31/16 102/78    Weight from Last 3 Encounters:   17 157 lb (71.215 kg)   16 153 lb (69.4 kg)   10/31/16 152 lb (68.947 kg)              Today, you had the following     No orders found for display         Today's Medication Changes          These changes are accurate as of: 17  3:55 PM.  If you have any questions, ask your nurse or doctor.               Start taking these medicines.        Dose/Directions    propranolol 80 MG 24 hr capsule   Commonly known as:  INDERAL LA   Used for:  Intractable chronic migraine without aura and without status migrainosus   Started by:  Juliet Eaton DO        Dose:  80 mg   Take 1 capsule (80 mg) by mouth daily   Quantity:  30 capsule   Refills:  1            Where to get your medicines      These medications were sent to [facility] [place] - [place], [place] - [address]  [address] [facility], [facility] [address]     Phone:  [phone]    - albuterol 108 (90 BASE) MCG/ACT Inhaler  - budesonide-formoterol 160-4.5 MCG/ACT Inhaler  - propranolol 80 MG 24 hr capsule             Primary Care Provider Office Phone # Fax #    Juliet Eaton [phone]       [facility] " [address] [facility] [address]        Thank you!     Thank you for choosing [facility]  for your care. Our goal is always to provide you with excellent care. Hearing back from our patients is one way we can continue to improve our services. Please take a few minutes to complete the written survey that you may receive in the mail after your visit with us. Thank you!             Your Updated Medication List - Protect others around you: Learn how to safely use, store and throw away your medicines at www.disposemymeds.org.          This list is accurate as of: 1/17/17  3:55 PM.  Always use your most recent med list.                   Brand Name Dispense Instructions for use    * albuterol (2.5 MG/3ML) 0.083% neb solution     75 mL    Take 1 vial (2.5 mg) by nebulization every 6 hours as needed for shortness of breath / dyspnea or wheezing       * albuterol 108 (90 BASE) MCG/ACT Inhaler    albuterol    1 Inhaler    Inhale 2 puffs into the lungs every 4 hours as needed for shortness of breath / dyspnea       ASPIRIN PO      Take 325 mg by mouth       aspirin-acetaminophen-caffeine 250-250-65 MG per tablet    EXCEDRIN MIGRAINE     Take 1 tablet by mouth every 6 hours as needed.       azelastine 0.1 % spray    ASTELIN    1 Bottle    Spray 2 sprays into both nostrils 2 times daily       budesonide-formoterol 160-4.5 MCG/ACT Inhaler    SYMBICORT    1 Inhaler    Inhale 2 puffs into the lungs 2 times daily       D3-50 35990 UNITS capsule   Generic drug:  cholecalciferol          EPINEPHrine 0.3 MG/0.3ML injection      Inject 0.3 mLs (0.3 mg) into the muscle as needed for anaphylaxis       esomeprazole 20 MG CR capsule    nexIUM    30 capsule    Take 1 capsule (20 mg) by mouth every morning (before breakfast) Take 30-60 minutes before eating.       IBUPROFEN PO          ipratropium 0.06 % spray    ATROVENT    1 Box    Spray 2 sprays into both nostrils 4 times daily as needed for rhinitis        propranolol 80 MG 24 hr capsule    INDERAL LA    30 capsule    Take 1 capsule (80 mg) by mouth daily       rizatriptan 10 MG tablet    MAXALT    18 tablet    Take 1 tablet (10 mg) by mouth at onset of headache for migraine May repeat dose in 2 hours.  Do not exceed 30 mg in 24 hours.       * Notice:  This list has 2 medication(s) that are the same as other medications prescribed for you. Read the directions carefully, and ask your doctor or other care provider to review them with you.       bilateral normal...

## 2017-09-21 LAB
ANION GAP SERPL CALC-SCNC: 9 MMOL/L — SIGNIFICANT CHANGE UP (ref 5–17)
APPEARANCE UR: CLEAR — SIGNIFICANT CHANGE UP
BILIRUB UR-MCNC: NEGATIVE — SIGNIFICANT CHANGE UP
BUN SERPL-MCNC: 8 MG/DL — SIGNIFICANT CHANGE UP (ref 7–23)
CALCIUM SERPL-MCNC: 8.7 MG/DL — SIGNIFICANT CHANGE UP (ref 8.5–10.1)
CHLORIDE SERPL-SCNC: 106 MMOL/L — SIGNIFICANT CHANGE UP (ref 96–108)
CO2 SERPL-SCNC: 25 MMOL/L — SIGNIFICANT CHANGE UP (ref 22–31)
COLOR SPEC: YELLOW — SIGNIFICANT CHANGE UP
CREAT SERPL-MCNC: 0.63 MG/DL — SIGNIFICANT CHANGE UP (ref 0.5–1.3)
DIFF PNL FLD: NEGATIVE — SIGNIFICANT CHANGE UP
GLUCOSE SERPL-MCNC: 198 MG/DL — HIGH (ref 70–99)
GLUCOSE UR QL: 1000 MG/DL
HCT VFR BLD CALC: 33.9 % — LOW (ref 34.5–45)
HGB BLD-MCNC: 11.2 G/DL — LOW (ref 11.5–15.5)
KETONES UR-MCNC: NEGATIVE — SIGNIFICANT CHANGE UP
LEUKOCYTE ESTERASE UR-ACNC: NEGATIVE — SIGNIFICANT CHANGE UP
MCHC RBC-ENTMCNC: 29.3 PG — SIGNIFICANT CHANGE UP (ref 27–34)
MCHC RBC-ENTMCNC: 33.2 GM/DL — SIGNIFICANT CHANGE UP (ref 32–36)
MCV RBC AUTO: 88.3 FL — SIGNIFICANT CHANGE UP (ref 80–100)
NITRITE UR-MCNC: NEGATIVE — SIGNIFICANT CHANGE UP
PH UR: 5 — SIGNIFICANT CHANGE UP (ref 5–8)
PLATELET # BLD AUTO: 288 K/UL — SIGNIFICANT CHANGE UP (ref 150–400)
POTASSIUM SERPL-MCNC: 3.8 MMOL/L — SIGNIFICANT CHANGE UP (ref 3.5–5.3)
POTASSIUM SERPL-SCNC: 3.8 MMOL/L — SIGNIFICANT CHANGE UP (ref 3.5–5.3)
PROT UR-MCNC: NEGATIVE MG/DL — SIGNIFICANT CHANGE UP
RBC # BLD: 3.84 M/UL — SIGNIFICANT CHANGE UP (ref 3.8–5.2)
RBC # FLD: 12.9 % — SIGNIFICANT CHANGE UP (ref 10.3–14.5)
SODIUM SERPL-SCNC: 140 MMOL/L — SIGNIFICANT CHANGE UP (ref 135–145)
SP GR SPEC: 1.01 — SIGNIFICANT CHANGE UP (ref 1.01–1.02)
UROBILINOGEN FLD QL: NEGATIVE MG/DL — SIGNIFICANT CHANGE UP
WBC # BLD: 14.4 K/UL — HIGH (ref 3.8–10.5)
WBC # FLD AUTO: 14.4 K/UL — HIGH (ref 3.8–10.5)

## 2017-09-21 RX ORDER — IBUPROFEN 200 MG
400 TABLET ORAL THREE TIMES A DAY
Qty: 0 | Refills: 0 | Status: DISCONTINUED | OUTPATIENT
Start: 2017-09-21 | End: 2017-09-24

## 2017-09-21 RX ORDER — OXYCODONE HYDROCHLORIDE 5 MG/1
30 TABLET ORAL THREE TIMES A DAY
Qty: 0 | Refills: 0 | Status: DISCONTINUED | OUTPATIENT
Start: 2017-09-21 | End: 2017-09-24

## 2017-09-21 RX ORDER — PANTOPRAZOLE SODIUM 20 MG/1
20 TABLET, DELAYED RELEASE ORAL
Qty: 0 | Refills: 0 | Status: DISCONTINUED | OUTPATIENT
Start: 2017-09-21 | End: 2017-09-24

## 2017-09-21 RX ADMIN — Medication 100 MILLIGRAM(S): at 21:24

## 2017-09-21 RX ADMIN — Medication 81 MILLIGRAM(S): at 12:26

## 2017-09-21 RX ADMIN — Medication 3 MILLILITER(S): at 13:41

## 2017-09-21 RX ADMIN — OXYCODONE HYDROCHLORIDE 30 MILLIGRAM(S): 5 TABLET ORAL at 13:35

## 2017-09-21 RX ADMIN — Medication 40 MILLIGRAM(S): at 05:54

## 2017-09-21 RX ADMIN — SENNA PLUS 2 TABLET(S): 8.6 TABLET ORAL at 21:26

## 2017-09-21 RX ADMIN — CEFTRIAXONE 100 GRAM(S): 500 INJECTION, POWDER, FOR SOLUTION INTRAMUSCULAR; INTRAVENOUS at 21:23

## 2017-09-21 RX ADMIN — AZITHROMYCIN 255 MILLIGRAM(S): 500 TABLET, FILM COATED ORAL at 20:26

## 2017-09-21 RX ADMIN — ENOXAPARIN SODIUM 40 MILLIGRAM(S): 100 INJECTION SUBCUTANEOUS at 21:34

## 2017-09-21 RX ADMIN — PANTOPRAZOLE SODIUM 20 MILLIGRAM(S): 20 TABLET, DELAYED RELEASE ORAL at 18:39

## 2017-09-21 RX ADMIN — Medication 3 MILLILITER(S): at 08:29

## 2017-09-21 RX ADMIN — Medication 100 MILLIGRAM(S): at 17:29

## 2017-09-21 RX ADMIN — BUDESONIDE AND FORMOTEROL FUMARATE DIHYDRATE 2 PUFF(S): 160; 4.5 AEROSOL RESPIRATORY (INHALATION) at 18:46

## 2017-09-21 RX ADMIN — MORPHINE SULFATE 30 MILLIGRAM(S): 50 CAPSULE, EXTENDED RELEASE ORAL at 17:29

## 2017-09-21 RX ADMIN — Medication 400 MILLIGRAM(S): at 13:38

## 2017-09-21 RX ADMIN — Medication 100 MILLIGRAM(S): at 05:54

## 2017-09-21 RX ADMIN — Medication 400 MILLIGRAM(S): at 13:34

## 2017-09-21 RX ADMIN — OXYCODONE HYDROCHLORIDE 30 MILLIGRAM(S): 5 TABLET ORAL at 14:12

## 2017-09-21 RX ADMIN — Medication 75 MILLIGRAM(S): at 05:54

## 2017-09-21 RX ADMIN — Medication 400 MILLIGRAM(S): at 21:55

## 2017-09-21 RX ADMIN — MORPHINE SULFATE 30 MILLIGRAM(S): 50 CAPSULE, EXTENDED RELEASE ORAL at 17:33

## 2017-09-21 RX ADMIN — GABAPENTIN 300 MILLIGRAM(S): 400 CAPSULE ORAL at 21:26

## 2017-09-21 RX ADMIN — Medication 3 MILLILITER(S): at 18:44

## 2017-09-21 RX ADMIN — MORPHINE SULFATE 30 MILLIGRAM(S): 50 CAPSULE, EXTENDED RELEASE ORAL at 05:54

## 2017-09-21 RX ADMIN — Medication 400 MILLIGRAM(S): at 21:26

## 2017-09-21 RX ADMIN — MORPHINE SULFATE 30 MILLIGRAM(S): 50 CAPSULE, EXTENDED RELEASE ORAL at 00:24

## 2017-09-21 RX ADMIN — Medication 75 MILLIGRAM(S): at 13:34

## 2017-09-21 RX ADMIN — OXYCODONE HYDROCHLORIDE 30 MILLIGRAM(S): 5 TABLET ORAL at 21:55

## 2017-09-21 RX ADMIN — ATENOLOL 25 MILLIGRAM(S): 25 TABLET ORAL at 21:24

## 2017-09-21 RX ADMIN — Medication 3 MILLILITER(S): at 23:35

## 2017-09-21 RX ADMIN — MORPHINE SULFATE 30 MILLIGRAM(S): 50 CAPSULE, EXTENDED RELEASE ORAL at 08:10

## 2017-09-21 RX ADMIN — OXYCODONE HYDROCHLORIDE 30 MILLIGRAM(S): 5 TABLET ORAL at 21:27

## 2017-09-21 RX ADMIN — Medication 40 MILLIGRAM(S): at 17:29

## 2017-09-21 RX ADMIN — Medication 75 MILLIGRAM(S): at 21:28

## 2017-09-21 RX ADMIN — SODIUM CHLORIDE 100 MILLILITER(S): 9 INJECTION INTRAMUSCULAR; INTRAVENOUS; SUBCUTANEOUS at 09:00

## 2017-09-21 NOTE — PROGRESS NOTE ADULT - SUBJECTIVE AND OBJECTIVE BOX
62 y-o Female with PMHx of HTN, presents to the ED c/o productive cough. Pt states she went to PMD for cough x 1.5 weeks, was given steroids finished 7 days ago. Pt states x2days ago productive cough returned with fever. Hx of Hospitalization for PNA last April. Denies upper back pain, CP, N/V/D    : feeling a little better      PHYSICAL EXAM:    Daily Height in cm: 157.48 (20 Sep 2017 22:12)    Daily     ICU Vital Signs Last 24 Hrs  T(C): 37 (21 Sep 2017 11:10), Max: 37.3 (20 Sep 2017 22:12)  T(F): 98.6 (21 Sep 2017 11:10), Max: 99.1 (20 Sep 2017 22:12)  HR: 80 (21 Sep 2017 13:40) (80 - 100)  BP: 158/89 (21 Sep 2017 11:10) (112/71 - 168/70)  BP(mean): --  ABP: --  ABP(mean): --  RR: 17 (21 Sep 2017 11:10) (16 - 18)  SpO2: 98% (21 Sep 2017 11:10) (94% - 100%)      Constitutional: Well appearing  HEENT: Atraumatic, LAURA, Normal, No congestion  Respiratory: Breath Sounds normal, b/l rhonchi/wheeze  Cardiovascular: N S1S2; MARY present  Gastrointestinal: Abdomen soft, non tender, Bowel Sounds present  Extremities: No edema, peripheral pulses present  Neurological: AAO x 3, no gross focal motor deficits  Skin: Non cellulitic, no rash, ulcers  Lymph Nodes: No lymphadenopathy noted  Back: No CVA tenderness   Musculoskeletal: non tender  Breasts: Deferred  Genitourinary: deferred  Rectal: Deferred                          11.2   14.4  )-----------( 288      ( 21 Sep 2017 06:26 )             33.9       CBC Full  -  ( 21 Sep 2017 06:26 )  WBC Count : 14.4 K/uL  Hemoglobin : 11.2 g/dL  Hematocrit : 33.9 %  Platelet Count - Automated : 288 K/uL  Mean Cell Volume : 88.3 fl  Mean Cell Hemoglobin : 29.3 pg  Mean Cell Hemoglobin Concentration : 33.2 gm/dL  Auto Neutrophil # : x  Auto Lymphocyte # : x  Auto Monocyte # : x  Auto Eosinophil # : x  Auto Basophil # : x  Auto Neutrophil % : x  Auto Lymphocyte % : x  Auto Monocyte % : x  Auto Eosinophil % : x  Auto Basophil % : x          131<L>  |  99  |  17  ----------------------------<  147<H>  3.6   |  30  |  1.10    Ca    9.0      20 Sep 2017 15:15    TPro  7.0  /  Alb  3.1<L>  /  TBili  0.4  /  DBili  x   /  AST  21  /  ALT  29  /  AlkPhos  83        LIVER FUNCTIONS - ( 20 Sep 2017 15:15 )  Alb: 3.1 g/dL / Pro: 7.0 gm/dL / ALK PHOS: 83 U/L / ALT: 29 U/L / AST: 21 U/L / GGT: x                       Urinalysis Basic - ( 20 Sep 2017 00:15 )    Color: Yellow / Appearance: Clear / S.010 / pH: x  Gluc: x / Ketone: Negative  / Bili: Negative / Urobili: Negative mg/dL   Blood: x / Protein: Negative mg/dL / Nitrite: Negative   Leuk Esterase: Negative / RBC: x / WBC x   Sq Epi: x / Non Sq Epi: x / Bacteria: x            MEDICATIONS  (STANDING):  enoxaparin Injectable 40 milliGRAM(s) SubCutaneous every 24 hours  cefTRIAXone   IVPB      azithromycin  IVPB      ALBUTerol/ipratropium for Nebulization 3 milliLiter(s) Nebulizer four times a day  buDESOnide 160 MICROgram(s)/formoterol 4.5 MICROgram(s) Inhaler 2 Puff(s) Inhalation two times a day  docusate sodium 100 milliGRAM(s) Oral two times a day  senna 2 Tablet(s) Oral at bedtime  aspirin  chewable 81 milliGRAM(s) Oral daily  ATENolol  Tablet 25 milliGRAM(s) Oral at bedtime  cefTRIAXone   IVPB 1 Gram(s) IV Intermittent every 24 hours  azithromycin  IVPB 500 milliGRAM(s) IV Intermittent every 24 hours  morphine ER Tablet 30 milliGRAM(s) Oral every 12 hours  pregabalin 75 milliGRAM(s) Oral three times a day  gabapentin 300 milliGRAM(s) Oral at bedtime  amitriptyline 100 milliGRAM(s) Oral at bedtime  methylPREDNISolone sodium succinate Injectable 40 milliGRAM(s) IV Push every 12 hours  oxyCODONE    IR 30 milliGRAM(s) Oral three times a day  ibuprofen  Tablet 400 milliGRAM(s) Oral three times a day

## 2017-09-22 LAB
HCT VFR BLD CALC: 30 % — LOW (ref 34.5–45)
HGB BLD-MCNC: 10.6 G/DL — LOW (ref 11.5–15.5)
MCHC RBC-ENTMCNC: 31.2 PG — SIGNIFICANT CHANGE UP (ref 27–34)
MCHC RBC-ENTMCNC: 35.4 GM/DL — SIGNIFICANT CHANGE UP (ref 32–36)
MCV RBC AUTO: 88 FL — SIGNIFICANT CHANGE UP (ref 80–100)
PLATELET # BLD AUTO: 264 K/UL — SIGNIFICANT CHANGE UP (ref 150–400)
RBC # BLD: 3.41 M/UL — LOW (ref 3.8–5.2)
RBC # FLD: 13 % — SIGNIFICANT CHANGE UP (ref 10.3–14.5)
WBC # BLD: 20.2 K/UL — HIGH (ref 3.8–10.5)
WBC # FLD AUTO: 20.2 K/UL — HIGH (ref 3.8–10.5)

## 2017-09-22 RX ORDER — SODIUM CHLORIDE 0.65 %
1 AEROSOL, SPRAY (ML) NASAL
Qty: 0 | Refills: 0 | Status: DISCONTINUED | OUTPATIENT
Start: 2017-09-22 | End: 2017-09-24

## 2017-09-22 RX ADMIN — OXYCODONE HYDROCHLORIDE 30 MILLIGRAM(S): 5 TABLET ORAL at 15:15

## 2017-09-22 RX ADMIN — ENOXAPARIN SODIUM 40 MILLIGRAM(S): 100 INJECTION SUBCUTANEOUS at 21:43

## 2017-09-22 RX ADMIN — Medication 100 MILLIGRAM(S): at 05:24

## 2017-09-22 RX ADMIN — AZITHROMYCIN 255 MILLIGRAM(S): 500 TABLET, FILM COATED ORAL at 22:06

## 2017-09-22 RX ADMIN — Medication 400 MILLIGRAM(S): at 13:28

## 2017-09-22 RX ADMIN — BUDESONIDE AND FORMOTEROL FUMARATE DIHYDRATE 2 PUFF(S): 160; 4.5 AEROSOL RESPIRATORY (INHALATION) at 09:02

## 2017-09-22 RX ADMIN — Medication 75 MILLIGRAM(S): at 13:28

## 2017-09-22 RX ADMIN — GABAPENTIN 300 MILLIGRAM(S): 400 CAPSULE ORAL at 21:41

## 2017-09-22 RX ADMIN — Medication 1 SPRAY(S): at 12:01

## 2017-09-22 RX ADMIN — MORPHINE SULFATE 30 MILLIGRAM(S): 50 CAPSULE, EXTENDED RELEASE ORAL at 18:10

## 2017-09-22 RX ADMIN — Medication 400 MILLIGRAM(S): at 15:15

## 2017-09-22 RX ADMIN — Medication 400 MILLIGRAM(S): at 05:55

## 2017-09-22 RX ADMIN — ATENOLOL 25 MILLIGRAM(S): 25 TABLET ORAL at 21:42

## 2017-09-22 RX ADMIN — OXYCODONE HYDROCHLORIDE 30 MILLIGRAM(S): 5 TABLET ORAL at 13:28

## 2017-09-22 RX ADMIN — Medication 400 MILLIGRAM(S): at 21:41

## 2017-09-22 RX ADMIN — Medication 100 MILLIGRAM(S): at 18:10

## 2017-09-22 RX ADMIN — Medication 75 MILLIGRAM(S): at 21:41

## 2017-09-22 RX ADMIN — CEFTRIAXONE 100 GRAM(S): 500 INJECTION, POWDER, FOR SOLUTION INTRAMUSCULAR; INTRAVENOUS at 21:42

## 2017-09-22 RX ADMIN — OXYCODONE HYDROCHLORIDE 30 MILLIGRAM(S): 5 TABLET ORAL at 21:42

## 2017-09-22 RX ADMIN — MORPHINE SULFATE 30 MILLIGRAM(S): 50 CAPSULE, EXTENDED RELEASE ORAL at 05:55

## 2017-09-22 RX ADMIN — PANTOPRAZOLE SODIUM 20 MILLIGRAM(S): 20 TABLET, DELAYED RELEASE ORAL at 06:10

## 2017-09-22 RX ADMIN — MORPHINE SULFATE 30 MILLIGRAM(S): 50 CAPSULE, EXTENDED RELEASE ORAL at 05:24

## 2017-09-22 RX ADMIN — MORPHINE SULFATE 30 MILLIGRAM(S): 50 CAPSULE, EXTENDED RELEASE ORAL at 18:11

## 2017-09-22 RX ADMIN — Medication 81 MILLIGRAM(S): at 12:02

## 2017-09-22 RX ADMIN — OXYCODONE HYDROCHLORIDE 30 MILLIGRAM(S): 5 TABLET ORAL at 05:24

## 2017-09-22 RX ADMIN — BUDESONIDE AND FORMOTEROL FUMARATE DIHYDRATE 2 PUFF(S): 160; 4.5 AEROSOL RESPIRATORY (INHALATION) at 19:18

## 2017-09-22 RX ADMIN — Medication 3 MILLILITER(S): at 09:01

## 2017-09-22 RX ADMIN — Medication 40 MILLIGRAM(S): at 05:24

## 2017-09-22 RX ADMIN — Medication 3 MILLILITER(S): at 19:08

## 2017-09-22 RX ADMIN — Medication 3 MILLILITER(S): at 13:13

## 2017-09-22 RX ADMIN — Medication 400 MILLIGRAM(S): at 05:24

## 2017-09-22 RX ADMIN — OXYCODONE HYDROCHLORIDE 30 MILLIGRAM(S): 5 TABLET ORAL at 05:55

## 2017-09-22 RX ADMIN — Medication 100 MILLIGRAM(S): at 21:42

## 2017-09-22 RX ADMIN — Medication 75 MILLIGRAM(S): at 05:24

## 2017-09-22 NOTE — PROGRESS NOTE ADULT - SUBJECTIVE AND OBJECTIVE BOX
SUBJECTIVE     Patient clinically feeling better and was able to sleep last night .  No fever spikes in the last 24 hours   mild nasal congestion           PAST MEDICAL & SURGICAL HISTORY:  MRSA (methicillin resistant staph aureus) culture positive: 2011  Sciatica of right side  Spinal stenosis, unspecified spinal region  Lumbar herniated disc  Scoliosis of thoracic spine, unspecified scoliosis type  Osteoarthritis of right shoulder, unspecified osteoarthritis type  Osteoarthritis of left knee, unspecified osteoarthritis type  Kidney cysts: benign  Kidney stone on right side  Diverticulosis of intestine without bleeding, unspecified intestinal tract location: Colon resection  Hernia of anterior abdominal wall  Gastroesophageal reflux disease, esophagitis presence not specified  Essential hypertension  Seasonal allergic rhinitis, unspecified allergic rhinitis trigger  S/P rotator cuff repair: right  Bilateral carpal tunnel syndrome: 2006, 2007  History of left knee surgery: x 6 Left Knee surgery -2013  History of colon resection: Colon Resection - 2005          OBJECTIVE       Vital Signs Last 24 Hrs  T(C): 37 (22 Sep 2017 05:01), Max: 37 (21 Sep 2017 11:10)  T(F): 98.6 (22 Sep 2017 05:01), Max: 98.6 (21 Sep 2017 11:10)  HR: 85 (22 Sep 2017 05:01) (79 - 106)  BP: 128/73 (22 Sep 2017 05:01) (128/73 - 175/75)  BP(mean): --  RR: 16 (22 Sep 2017 05:01) (16 - 17)  SpO2: 97% (22 Sep 2017 05:01) (97% - 98%)    PHYSICAL EXAM:    Constitutional: , awake and alert, not in distress.     HEENT: Normo cephalic atruamtic     Neck: Soft and supple, No J.V.D     Respiratory: vesicular breathing ,has decreased breath sounds in the right base with rales over the bases     Cardiovascular: S1 and S2, regular rate .     Gastrointestinal:  soft, nontender,     Extremities: No peripheral edema    Neurological: A/O x 3, no focal deficits.      Medications:  MEDICATIONS  (STANDING):  enoxaparin Injectable 40 milliGRAM(s) SubCutaneous every 24 hours  cefTRIAXone   IVPB      azithromycin  IVPB      ALBUTerol/ipratropium for Nebulization 3 milliLiter(s) Nebulizer four times a day  buDESOnide 160 MICROgram(s)/formoterol 4.5 MICROgram(s) Inhaler 2 Puff(s) Inhalation two times a day  docusate sodium 100 milliGRAM(s) Oral two times a day  senna 2 Tablet(s) Oral at bedtime  aspirin  chewable 81 milliGRAM(s) Oral daily  ATENolol  Tablet 25 milliGRAM(s) Oral at bedtime  cefTRIAXone   IVPB 1 Gram(s) IV Intermittent every 24 hours  azithromycin  IVPB 500 milliGRAM(s) IV Intermittent every 24 hours  morphine ER Tablet 30 milliGRAM(s) Oral every 12 hours  pregabalin 75 milliGRAM(s) Oral three times a day  gabapentin 300 milliGRAM(s) Oral at bedtime  amitriptyline 100 milliGRAM(s) Oral at bedtime  methylPREDNISolone sodium succinate Injectable 40 milliGRAM(s) IV Push every 12 hours  oxyCODONE    IR 30 milliGRAM(s) Oral three times a day  ibuprofen  Tablet 400 milliGRAM(s) Oral three times a day  pantoprazole    Tablet 20 milliGRAM(s) Oral before breakfast      Labs: All Labs Reviewed:                        10.6   20.2  )-----------( 264      ( 22 Sep 2017 07:19 )             30.0     09-21    140  |  106  |  8   ----------------------------<  198<H>  3.8   |  25  |  0.63    Ca    8.7      21 Sep 2017 06:26    TPro  7.0  /  Alb  3.1<L>  /  TBili  0.4  /  DBili  x   /  AST  21  /  ALT  29  /  AlkPhos  83  09-20

## 2017-09-22 NOTE — PROGRESS NOTE ADULT - SUBJECTIVE AND OBJECTIVE BOX
62 y-o Female with PMHx of HTN, presents to the ED c/o productive cough. Pt states she went to PMD for cough x 1.5 weeks, was given steroids finished 7 days ago. Pt states x2days ago productive cough returned with fever. Hx of Hospitalization for PNA last April. Denies upper back pain, CP, N/V/D    9/21: feeling a little better    9/22 better today, back pain better too,       PHYSICAL EXAM:    Daily     Daily     ICU Vital Signs Last 24 Hrs  T(C): 37.1 (22 Sep 2017 11:24), Max: 37.1 (22 Sep 2017 11:24)  T(F): 98.7 (22 Sep 2017 11:24), Max: 98.7 (22 Sep 2017 11:24)  HR: 88 (22 Sep 2017 11:24) (79 - 106)  BP: 162/85 (22 Sep 2017 11:24) (128/73 - 175/75)  BP(mean): --  ABP: --  ABP(mean): --  RR: 16 (22 Sep 2017 11:24) (16 - 17)  SpO2: 96% (22 Sep 2017 11:24) (96% - 97%)      Constitutional: Well appearing  HEENT: Atraumatic, LAURA, Normal, No congestion  Respiratory: Breath Sounds normal, no rhonchi/wheeze  Cardiovascular: N S1S2; MARY present  Gastrointestinal: Abdomen soft, non tender, Bowel Sounds present  Extremities: No edema, peripheral pulses present  Neurological: AAO x 3, no gross focal motor deficits  Skin: Non cellulitic, no rash, ulcers  Lymph Nodes: No lymphadenopathy noted  Back: No CVA tenderness   Musculoskeletal: non tender  Breasts: Deferred  Genitourinary: deferred  Rectal: Deferred                          10.6   20.2  )-----------( 264      ( 22 Sep 2017 07:19 )             30.0       CBC Full  -  ( 22 Sep 2017 07:19 )  WBC Count : 20.2 K/uL  Hemoglobin : 10.6 g/dL  Hematocrit : 30.0 %  Platelet Count - Automated : 264 K/uL  Mean Cell Volume : 88.0 fl  Mean Cell Hemoglobin : 31.2 pg  Mean Cell Hemoglobin Concentration : 35.4 gm/dL  Auto Neutrophil # : x  Auto Lymphocyte # : x  Auto Monocyte # : x  Auto Eosinophil # : x  Auto Basophil # : x  Auto Neutrophil % : x  Auto Lymphocyte % : x  Auto Monocyte % : x  Auto Eosinophil % : x  Auto Basophil % : x      09-21    140  |  106  |  8   ----------------------------<  198<H>  3.8   |  25  |  0.63    Ca    8.7      21 Sep 2017 06:26    TPro  7.0  /  Alb  3.1<L>  /  TBili  0.4  /  DBili  x   /  AST  21  /  ALT  29  /  AlkPhos  83  09-20      LIVER FUNCTIONS - ( 20 Sep 2017 15:15 )  Alb: 3.1 g/dL / Pro: 7.0 gm/dL / ALK PHOS: 83 U/L / ALT: 29 U/L / AST: 21 U/L / GGT: x                               MEDICATIONS  (STANDING):  enoxaparin Injectable 40 milliGRAM(s) SubCutaneous every 24 hours  cefTRIAXone   IVPB      azithromycin  IVPB      ALBUTerol/ipratropium for Nebulization 3 milliLiter(s) Nebulizer four times a day  buDESOnide 160 MICROgram(s)/formoterol 4.5 MICROgram(s) Inhaler 2 Puff(s) Inhalation two times a day  docusate sodium 100 milliGRAM(s) Oral two times a day  senna 2 Tablet(s) Oral at bedtime  aspirin  chewable 81 milliGRAM(s) Oral daily  ATENolol  Tablet 25 milliGRAM(s) Oral at bedtime  cefTRIAXone   IVPB 1 Gram(s) IV Intermittent every 24 hours  azithromycin  IVPB 500 milliGRAM(s) IV Intermittent every 24 hours  morphine ER Tablet 30 milliGRAM(s) Oral every 12 hours  pregabalin 75 milliGRAM(s) Oral three times a day  gabapentin 300 milliGRAM(s) Oral at bedtime  amitriptyline 100 milliGRAM(s) Oral at bedtime  oxyCODONE    IR 30 milliGRAM(s) Oral three times a day  ibuprofen  Tablet 400 milliGRAM(s) Oral three times a day  pantoprazole    Tablet 20 milliGRAM(s) Oral before breakfast  methylPREDNISolone sodium succinate Injectable 40 milliGRAM(s) IV Push daily

## 2017-09-23 LAB
HCT VFR BLD CALC: 31.2 % — LOW (ref 34.5–45)
HGB BLD-MCNC: 10.5 G/DL — LOW (ref 11.5–15.5)
MCHC RBC-ENTMCNC: 29.4 PG — SIGNIFICANT CHANGE UP (ref 27–34)
MCHC RBC-ENTMCNC: 33.6 GM/DL — SIGNIFICANT CHANGE UP (ref 32–36)
MCV RBC AUTO: 87.6 FL — SIGNIFICANT CHANGE UP (ref 80–100)
PLATELET # BLD AUTO: 273 K/UL — SIGNIFICANT CHANGE UP (ref 150–400)
RBC # BLD: 3.56 M/UL — LOW (ref 3.8–5.2)
RBC # FLD: 13.2 % — SIGNIFICANT CHANGE UP (ref 10.3–14.5)
WBC # BLD: 11.4 K/UL — HIGH (ref 3.8–10.5)
WBC # FLD AUTO: 11.4 K/UL — HIGH (ref 3.8–10.5)

## 2017-09-23 RX ORDER — AMLODIPINE BESYLATE 2.5 MG/1
5 TABLET ORAL DAILY
Qty: 0 | Refills: 0 | Status: DISCONTINUED | OUTPATIENT
Start: 2017-09-23 | End: 2017-09-23

## 2017-09-23 RX ORDER — AMLODIPINE BESYLATE 2.5 MG/1
10 TABLET ORAL DAILY
Qty: 0 | Refills: 0 | Status: DISCONTINUED | OUTPATIENT
Start: 2017-09-23 | End: 2017-09-24

## 2017-09-23 RX ORDER — HYDROCHLOROTHIAZIDE 25 MG
25 TABLET ORAL ONCE
Qty: 0 | Refills: 0 | Status: COMPLETED | OUTPATIENT
Start: 2017-09-23 | End: 2017-09-23

## 2017-09-23 RX ORDER — CEFUROXIME AXETIL 250 MG
500 TABLET ORAL EVERY 12 HOURS
Qty: 0 | Refills: 0 | Status: DISCONTINUED | OUTPATIENT
Start: 2017-09-23 | End: 2017-09-24

## 2017-09-23 RX ORDER — AMLODIPINE BESYLATE 2.5 MG/1
5 TABLET ORAL ONCE
Qty: 0 | Refills: 0 | Status: COMPLETED | OUTPATIENT
Start: 2017-09-23 | End: 2017-09-23

## 2017-09-23 RX ORDER — AZITHROMYCIN 500 MG/1
500 TABLET, FILM COATED ORAL DAILY
Qty: 0 | Refills: 0 | Status: DISCONTINUED | OUTPATIENT
Start: 2017-09-23 | End: 2017-09-24

## 2017-09-23 RX ADMIN — PANTOPRAZOLE SODIUM 20 MILLIGRAM(S): 20 TABLET, DELAYED RELEASE ORAL at 05:25

## 2017-09-23 RX ADMIN — Medication 25 MILLIGRAM(S): at 18:40

## 2017-09-23 RX ADMIN — MORPHINE SULFATE 30 MILLIGRAM(S): 50 CAPSULE, EXTENDED RELEASE ORAL at 17:28

## 2017-09-23 RX ADMIN — OXYCODONE HYDROCHLORIDE 30 MILLIGRAM(S): 5 TABLET ORAL at 05:20

## 2017-09-23 RX ADMIN — BUDESONIDE AND FORMOTEROL FUMARATE DIHYDRATE 2 PUFF(S): 160; 4.5 AEROSOL RESPIRATORY (INHALATION) at 08:19

## 2017-09-23 RX ADMIN — Medication 75 MILLIGRAM(S): at 21:27

## 2017-09-23 RX ADMIN — BUDESONIDE AND FORMOTEROL FUMARATE DIHYDRATE 2 PUFF(S): 160; 4.5 AEROSOL RESPIRATORY (INHALATION) at 19:30

## 2017-09-23 RX ADMIN — Medication 400 MILLIGRAM(S): at 21:28

## 2017-09-23 RX ADMIN — Medication 81 MILLIGRAM(S): at 12:04

## 2017-09-23 RX ADMIN — OXYCODONE HYDROCHLORIDE 30 MILLIGRAM(S): 5 TABLET ORAL at 21:27

## 2017-09-23 RX ADMIN — Medication 40 MILLIGRAM(S): at 05:20

## 2017-09-23 RX ADMIN — Medication 3 MILLILITER(S): at 19:29

## 2017-09-23 RX ADMIN — AMLODIPINE BESYLATE 5 MILLIGRAM(S): 2.5 TABLET ORAL at 10:21

## 2017-09-23 RX ADMIN — Medication 3 MILLILITER(S): at 16:14

## 2017-09-23 RX ADMIN — Medication 400 MILLIGRAM(S): at 13:43

## 2017-09-23 RX ADMIN — MORPHINE SULFATE 30 MILLIGRAM(S): 50 CAPSULE, EXTENDED RELEASE ORAL at 17:52

## 2017-09-23 RX ADMIN — MORPHINE SULFATE 30 MILLIGRAM(S): 50 CAPSULE, EXTENDED RELEASE ORAL at 05:20

## 2017-09-23 RX ADMIN — Medication 3 MILLILITER(S): at 08:19

## 2017-09-23 RX ADMIN — AMLODIPINE BESYLATE 5 MILLIGRAM(S): 2.5 TABLET ORAL at 13:49

## 2017-09-23 RX ADMIN — Medication 75 MILLIGRAM(S): at 13:44

## 2017-09-23 RX ADMIN — Medication 100 MILLIGRAM(S): at 21:27

## 2017-09-23 RX ADMIN — OXYCODONE HYDROCHLORIDE 30 MILLIGRAM(S): 5 TABLET ORAL at 13:44

## 2017-09-23 RX ADMIN — Medication 100 MILLIGRAM(S): at 05:20

## 2017-09-23 RX ADMIN — Medication 400 MILLIGRAM(S): at 05:20

## 2017-09-23 RX ADMIN — Medication 500 MILLIGRAM(S): at 17:28

## 2017-09-23 RX ADMIN — GABAPENTIN 300 MILLIGRAM(S): 400 CAPSULE ORAL at 21:28

## 2017-09-23 RX ADMIN — AZITHROMYCIN 500 MILLIGRAM(S): 500 TABLET, FILM COATED ORAL at 12:03

## 2017-09-23 RX ADMIN — ATENOLOL 25 MILLIGRAM(S): 25 TABLET ORAL at 21:28

## 2017-09-23 RX ADMIN — Medication 75 MILLIGRAM(S): at 05:19

## 2017-09-23 RX ADMIN — ENOXAPARIN SODIUM 40 MILLIGRAM(S): 100 INJECTION SUBCUTANEOUS at 21:29

## 2017-09-23 RX ADMIN — Medication 3 MILLILITER(S): at 11:14

## 2017-09-23 NOTE — PROGRESS NOTE ADULT - SUBJECTIVE AND OBJECTIVE BOX
SUBJECTIVE     Patient continued to improve and has dry cough, no wheezing .  No fever spikes in the last 24 hours             PAST MEDICAL & SURGICAL HISTORY:  MRSA (methicillin resistant staph aureus) culture positive: 2011  Sciatica of right side  Spinal stenosis, unspecified spinal region  Lumbar herniated disc  Scoliosis of thoracic spine, unspecified scoliosis type  Osteoarthritis of right shoulder, unspecified osteoarthritis type  Osteoarthritis of left knee, unspecified osteoarthritis type  Kidney cysts: benign  Kidney stone on right side  Diverticulosis of intestine without bleeding, unspecified intestinal tract location: Colon resection  Hernia of anterior abdominal wall  Gastroesophageal reflux disease, esophagitis presence not specified  Essential hypertension  Seasonal allergic rhinitis, unspecified allergic rhinitis trigger  S/P rotator cuff repair: right  Bilateral carpal tunnel syndrome: 2006, 2007  History of left knee surgery: x 6 Left Knee surgery -2013  History of colon resection: Colon Resection - 2005          OBJECTIVE       Vital Signs Last 24 Hrs  T(C): 36.7 (23 Sep 2017 05:11), Max: 37.1 (22 Sep 2017 11:24)  T(F): 98 (23 Sep 2017 05:11), Max: 98.7 (22 Sep 2017 11:24)  HR: 80 (23 Sep 2017 08:21) (66 - 88)  BP: 161/86 (23 Sep 2017 05:11) (161/86 - 174/72)  BP(mean): --  RR: 16 (23 Sep 2017 05:11) (16 - 16)  SpO2: 100% (23 Sep 2017 05:11) (96% - 100%)    PHYSICAL EXAM:    Constitutional: , awake and alert, not in distress.     HEENT: Normo cephalic atruamtic     Neck: Soft and supple, No J.V.D     Respiratory: vesicular breathing ,has decreased breath sounds in the right base with rales over the bases     Cardiovascular: S1 and S2, regular rate .     Gastrointestinal:  soft, nontender,     Extremities: No peripheral edema    Neurological: A/O x 3, no focal deficits.      Medications:  MEDICATIONS  (STANDING):  enoxaparin Injectable 40 milliGRAM(s) SubCutaneous every 24 hours  cefTRIAXone   IVPB      azithromycin  IVPB      ALBUTerol/ipratropium for Nebulization 3 milliLiter(s) Nebulizer four times a day  buDESOnide 160 MICROgram(s)/formoterol 4.5 MICROgram(s) Inhaler 2 Puff(s) Inhalation two times a day  docusate sodium 100 milliGRAM(s) Oral two times a day  senna 2 Tablet(s) Oral at bedtime  aspirin  chewable 81 milliGRAM(s) Oral daily  ATENolol  Tablet 25 milliGRAM(s) Oral at bedtime  cefTRIAXone   IVPB 1 Gram(s) IV Intermittent every 24 hours  azithromycin  IVPB 500 milliGRAM(s) IV Intermittent every 24 hours  morphine ER Tablet 30 milliGRAM(s) Oral every 12 hours  pregabalin 75 milliGRAM(s) Oral three times a day  gabapentin 300 milliGRAM(s) Oral at bedtime  amitriptyline 100 milliGRAM(s) Oral at bedtime  methylPREDNISolone sodium succinate Injectable 40 milliGRAM(s) IV Push every 12 hours  oxyCODONE    IR 30 milliGRAM(s) Oral three times a day  ibuprofen  Tablet 400 milliGRAM(s) Oral three times a day  pantoprazole    Tablet 20 milliGRAM(s) Oral before breakfast                            10.5   11.4  )-----------( 273      ( 23 Sep 2017 06:56 )             31.2

## 2017-09-24 ENCOUNTER — TRANSCRIPTION ENCOUNTER (OUTPATIENT)
Age: 62
End: 2017-09-24

## 2017-09-24 VITALS — HEART RATE: 74 BPM | SYSTOLIC BLOOD PRESSURE: 119 MMHG | DIASTOLIC BLOOD PRESSURE: 71 MMHG

## 2017-09-24 RX ORDER — OXYCODONE HYDROCHLORIDE 5 MG/1
1 TABLET ORAL
Qty: 0 | Refills: 0 | DISCHARGE
Start: 2017-09-24

## 2017-09-24 RX ORDER — GABAPENTIN 400 MG/1
1 CAPSULE ORAL
Qty: 0 | Refills: 0 | DISCHARGE
Start: 2017-09-24

## 2017-09-24 RX ORDER — MORPHINE SULFATE 50 MG/1
1 CAPSULE, EXTENDED RELEASE ORAL
Qty: 0 | Refills: 0 | DISCHARGE
Start: 2017-09-24

## 2017-09-24 RX ORDER — BUDESONIDE AND FORMOTEROL FUMARATE DIHYDRATE 160; 4.5 UG/1; UG/1
2 AEROSOL RESPIRATORY (INHALATION)
Qty: 1 | Refills: 0
Start: 2017-09-24 | End: 2017-10-24

## 2017-09-24 RX ORDER — CEFUROXIME AXETIL 250 MG
1 TABLET ORAL
Qty: 12 | Refills: 0
Start: 2017-09-24 | End: 2017-09-30

## 2017-09-24 RX ORDER — AMLODIPINE BESYLATE 2.5 MG/1
1 TABLET ORAL
Qty: 30 | Refills: 0
Start: 2017-09-24 | End: 2017-10-24

## 2017-09-24 RX ORDER — HYDROCHLOROTHIAZIDE 25 MG
25 TABLET ORAL DAILY
Qty: 0 | Refills: 0 | Status: DISCONTINUED | OUTPATIENT
Start: 2017-09-24 | End: 2017-09-24

## 2017-09-24 RX ORDER — AMITRIPTYLINE HCL 25 MG
1 TABLET ORAL
Qty: 0 | Refills: 0 | DISCHARGE
Start: 2017-09-24

## 2017-09-24 RX ADMIN — Medication 100 MILLIGRAM(S): at 05:31

## 2017-09-24 RX ADMIN — OXYCODONE HYDROCHLORIDE 30 MILLIGRAM(S): 5 TABLET ORAL at 05:31

## 2017-09-24 RX ADMIN — AMLODIPINE BESYLATE 10 MILLIGRAM(S): 2.5 TABLET ORAL at 05:32

## 2017-09-24 RX ADMIN — MORPHINE SULFATE 30 MILLIGRAM(S): 50 CAPSULE, EXTENDED RELEASE ORAL at 05:32

## 2017-09-24 RX ADMIN — Medication 81 MILLIGRAM(S): at 12:03

## 2017-09-24 RX ADMIN — Medication 3 MILLILITER(S): at 11:50

## 2017-09-24 RX ADMIN — Medication 400 MILLIGRAM(S): at 05:31

## 2017-09-24 RX ADMIN — BUDESONIDE AND FORMOTEROL FUMARATE DIHYDRATE 2 PUFF(S): 160; 4.5 AEROSOL RESPIRATORY (INHALATION) at 11:50

## 2017-09-24 RX ADMIN — Medication 75 MILLIGRAM(S): at 05:31

## 2017-09-24 RX ADMIN — AZITHROMYCIN 500 MILLIGRAM(S): 500 TABLET, FILM COATED ORAL at 12:03

## 2017-09-24 RX ADMIN — Medication 40 MILLIGRAM(S): at 05:31

## 2017-09-24 RX ADMIN — Medication 500 MILLIGRAM(S): at 05:31

## 2017-09-24 NOTE — DISCHARGE NOTE ADULT - VISION (WITH CORRECTIVE LENSES IF THE PATIENT USUALLY WEARS THEM):
glasses for driving/Partially impaired: cannot see medication labels or newsprint, but can see obstacles in path, and the surrounding layout; can count fingers at arm's length

## 2017-09-24 NOTE — DISCHARGE NOTE ADULT - CARE PLAN
Principal Discharge DX:	Pneumonia of right lower lobe due to infectious organism  Goal:	resolving  Instructions for follow-up, activity and diet:	cont ceftin as prescribed  f/u with pcp and dr. washburn

## 2017-09-24 NOTE — PROGRESS NOTE ADULT - ASSESSMENT
Patient is seen and consult dictated     right mid lung pneumonia with the patchy infiltrate   History of chronically elevated right hemidiaphragm with underlying atelectasis    bronchospasm associated with infection   - mild hyponatremia with leucocytosis     PLAN     - continue the current management and taper the steroids as the wheezing is better now
62/F admitted with     1) RLL PNA + Bronchospasm: community acquired  admit to med floor  iv rocephin and zithro  iv solumedrol  duonebs  symbicort  pulmo consult appreciated; start tapering steroids  f/u blood cx  CT chest if does not improve     2) HTN:  cont atenolol    3) Hyponatremia:  cont iv fluids  recheck in am    4) Leukocytosis: sec to PNA and steroid use  cont iv ABX  f/u cbc    5) DVT PPX:  lovenox s/q    6) Left Mid lung scar on CT chest of May 2017: CT chest in another 2 months ( 6 months from previous one)    poc discussed with pt, team
62/F admitted with     1) RLL PNA + Bronchospasm: community acquired  admit to med floor  iv rocephin and zithro  iv solumedrol decreased to 40 mg daily on 9/22 as per pulmo  mirza  [facility]  pulmo consult appreciated; start tapering steroids    2) HTN:  cont atenolol    3) Hyponatremia: resolved 140  d/c iv fluids    4) Leukocytosis: sec to PNA and steroid use  cont iv ABX  f/u cbc    5) DVT PPX:  lovenox s/q    6) Left Mid lung scar on CT chest of May 2017: CT chest in another 2 months ( 6 months from previous one)    poc discussed with pt, team
62/F admitted with     1) RLL PNA + Bronchospasm: community acquired  admit to med floor  iv rocephin and zithro changed to PO meds from 9/23  iv solumedrol decreased to 40 mg daily on 9/22 as per pulmo; chabge to po prednisone from 9/24  mirza  symbicort  pulmo consult appreciated; start tapering steroids    2) HTN: uncontrolled  likely sec to steroids  add Norvasc 10 mg po daily   cont atenolol    3) Hyponatremia: resolved 140  d/c iv fluids    4) Leukocytosis: sec to PNA and steroid use  cont iv ABX  f/u cbc    5) DVT PPX:  lovenox s/q    6) Left Mid lung scar on CT chest of May 2017: CT chest in another 2 months ( 6 months from previous one)    poc discussed with pt, team
right mid lung pneumonia with the patchy infiltrate   History of chronically elevated right hemidiaphragm with underlying atelectasis    bronchospasm associated with infection   - mild hyponatremia with leucocytosis     PLAN     - continue with iv antibiotics   - taper the solumedrol to daily dose as the wheezing is better   - continue with the nebs   - saline nasal spray for the nasal congestion
right mid lung pneumonia with the patchy infiltrate   History of chronically elevated right hemidiaphragm with underlying atelectasis    bronchospasm associated with infection improving with mild cough   - mild hyponatremia with leucocytosis     PLAN     - discontinue iv antibiotics and change to po from  today   - symptomatic relief for the cough   - prednisone 40 mg po daily from tomorrow for 2 days then 20 mg for 2 days then 10 mg po daily for 2 days   - symbicort for 1week   - out pt follow up with  me in 10 days
right mid lung pneumonia with the patchy infiltrate   History of chronically elevated right hemidiaphragm with underlying atelectasis    bronchospasm associated with infection improving with mild cough still on iv sterids and received today .  elevated B.P on medications     PLAN     - recommended low dose steroids at home with improved wheezing and elevated b.p .  - continue with symbicort and saline nasal spray   - would follow up in 10 daus   - discontinue azithromycin after today dose and continue with ceftin for completion

## 2017-09-24 NOTE — DISCHARGE NOTE ADULT - CARE PROVIDER_API CALL
Arlene Meadows), Internal Medicine  [address] [place] [address]  Phone: [phone]  Fax: [phone]    Karishma Bailey), Critical Care Medicine; Internal Medicine; Pulmonary Disease; Sleep Medicine  [address] [place] [address]  Phone: [phone]  Fax: [phone]

## 2017-09-24 NOTE — DISCHARGE NOTE ADULT - MEDICATION SUMMARY - MEDICATIONS TO STOP TAKING
I will STOP taking the medications listed below when I get home from the hospital:    MethylPREDNISolone Dose Pack 4 mg oral tablet  -- **Course completed

## 2017-09-24 NOTE — PROGRESS NOTE ADULT - SUBJECTIVE AND OBJECTIVE BOX
SUBJECTIVE     Patient has dry cough and discharge was held yesterday due to elevated B.P and medications adjustment were made. currently feeling better with improved wheeze         PAST MEDICAL & SURGICAL HISTORY:  MRSA (methicillin resistant staph aureus) culture positive: 2011  Sciatica of right side  Spinal stenosis, unspecified spinal region  Lumbar herniated disc  Scoliosis of thoracic spine, unspecified scoliosis type  Osteoarthritis of right shoulder, unspecified osteoarthritis type  Osteoarthritis of left knee, unspecified osteoarthritis type  Kidney cysts: benign  Kidney stone on right side  Diverticulosis of intestine without bleeding, unspecified intestinal tract location: Colon resection  Hernia of anterior abdominal wall  Gastroesophageal reflux disease, esophagitis presence not specified  Essential hypertension  Seasonal allergic rhinitis, unspecified allergic rhinitis trigger  S/P rotator cuff repair: right  Bilateral carpal tunnel syndrome: 2006, 2007  History of left knee surgery: x 6 Left Knee surgery -2013  History of colon resection: Colon Resection - 2005          OBJECTIVE       Vital Signs Last 24 Hrs  T(C): 36.4 (24 Sep 2017 05:25), Max: 37.3 (23 Sep 2017 20:28)  T(F): 97.6 (24 Sep 2017 05:25), Max: 99.1 (23 Sep 2017 20:28)  HR: 63 (24 Sep 2017 05:25) (63 - 100)  BP: 155/71 (24 Sep 2017 05:25) (155/71 - 182/77)  BP(mean): --  RR: 16 (24 Sep 2017 05:25) (16 - 20)  SpO2: 99% (24 Sep 2017 05:25) (98% - 100%)    PHYSICAL EXAM:    Constitutional: , awake and alert, not in distress.     HEENT: Normo cephalic atruamtic     Neck: Soft and supple, No J.V.D     Respiratory: vesicular breathing ,has decreased breath sounds in the right base with rales over the bases no wheeze     Cardiovascular: S1 and S2, regular rate .     Gastrointestinal:  soft, nontender,     Extremities: No peripheral edema    Neurological: A/O x 3, no focal deficits.      MEDICATIONS  (STANDING):  enoxaparin Injectable 40 milliGRAM(s) SubCutaneous every 24 hours  ALBUTerol/ipratropium for Nebulization 3 milliLiter(s) Nebulizer four times a day  buDESOnide 160 MICROgram(s)/formoterol 4.5 MICROgram(s) Inhaler 2 Puff(s) Inhalation two times a day  docusate sodium 100 milliGRAM(s) Oral two times a day  senna 2 Tablet(s) Oral at bedtime  aspirin  chewable 81 milliGRAM(s) Oral daily  ATENolol  Tablet 25 milliGRAM(s) Oral at bedtime  morphine ER Tablet 30 milliGRAM(s) Oral every 12 hours  pregabalin 75 milliGRAM(s) Oral three times a day  gabapentin 300 milliGRAM(s) Oral at bedtime  amitriptyline 100 milliGRAM(s) Oral at bedtime  oxyCODONE    IR 30 milliGRAM(s) Oral three times a day  ibuprofen  Tablet 400 milliGRAM(s) Oral three times a day  pantoprazole    Tablet 20 milliGRAM(s) Oral before breakfast  methylPREDNISolone sodium succinate Injectable 40 milliGRAM(s) IV Push daily  azithromycin   Tablet 500 milliGRAM(s) Oral daily  cefuroxime   Tablet 500 milliGRAM(s) Oral every 12 hours  amLODIPine   Tablet 10 milliGRAM(s) Oral daily                            10.5   11.4  )-----------( 273      ( 23 Sep 2017 06:56 )             31.2

## 2017-09-24 NOTE — DISCHARGE NOTE ADULT - MEDICATION SUMMARY - MEDICATIONS TO TAKE
I will START or STAY ON the medications listed below when I get home from the hospital:    predniSONE 10 mg oral tablet  -- 4 tab(s) by mouth once a day for 2 days,  3 tabs by mouth daily for 2 days,  2 tabs by mouth daily for 2 days,  1 tab by mouth daily for 2 days  -- Indication: For copd    morphine 30 mg/12 hr oral tablet, extended release  -- 1 tab(s) by mouth every 12 hours  -- Indication: For Pain    oxyCODONE 30 mg oral tablet  -- 1 tab(s) by mouth 3 times a day  -- Indication: For Pain    aspirin 81 mg oral tablet  -- 1 tab(s) by mouth once a day  -- Indication: For Prev    gabapentin 300 mg oral capsule  -- 1 cap(s) by mouth once a day (at bedtime)  -- Indication: For Pain    pregabalin 75 mg oral capsule  -- 1 cap(s) by mouth 3 times a day  -- Indication: For Pain    amitriptyline 100 mg oral tablet  -- 1 tab(s) by mouth once a day (at bedtime)  -- Indication: For Pain    atenolol 25 mg oral tablet  -- 1 tab(s) by mouth once a day (at bedtime)  -- Indication: For htn    budesonide-formoterol 160 mcg-4.5 mcg/inh inhalation aerosol  -- 2  inhaled 2 times a day   -- Indication: For copd    amLODIPine 10 mg oral tablet  -- 1 tab(s) by mouth once a day  -- Indication: For htn    cefuroxime 500 mg oral tablet  -- 1 tab(s) by mouth every 12 hours  -- Indication: For PNa    hydroCHLOROthiazide 25 mg oral tablet  -- 1 tab(s) by mouth once a day  -- Indication: For htn

## 2017-09-24 NOTE — DISCHARGE NOTE ADULT - CARE PROVIDERS DIRECT ADDRESSES
[email or web address]

## 2017-09-24 NOTE — DISCHARGE NOTE ADULT - HOSPITAL COURSE
62/F admitted with     1) RLL PNA + Bronchospasm: community acquired  admit to med floor  iv rocephin and zithro changed to PO meds from 9/23; d/c zithro after today; cont ceftin 500 mg orally q 12 hrs for 6 more days  iv solumedrol decreased to 40 mg daily on 9/22 as per pulmo; change to po prednisone from 9/24 and then rapid taper  f/u with Dr. Bailey /Dr. Meadows within a week  symbicort    2) HTN: uncontrolled  likely sec to steroids  add Norvasc 10 mg po daily   cont atenolol  added HCTZ 25 mg orally daily; check BP frequently; f/u with Dr. Meadows in 2-3 days for meds adjustment for BP as the effect of steroids goes away , likely the demand for BP meds would go down too.     3) Hyponatremia: resolved 140  d/c iv fluids    4) Leukocytosis: sec to PNA and steroid use  cont ABX  WBC 11.2 today, decreased from 20 k    7) Left Mid lung scar on CT chest of May 2017: CT chest in another 2 months ( 6 months from previous one)    poc discussed with pt, team    total time spent 45 min

## 2017-09-27 DIAGNOSIS — J44.0 CHRONIC OBSTRUCTIVE PULMONARY DISEASE WITH (ACUTE) LOWER RESPIRATORY INFECTION: ICD-10-CM

## 2017-09-27 DIAGNOSIS — R05 COUGH: ICD-10-CM

## 2017-09-27 DIAGNOSIS — Z79.82 LONG TERM (CURRENT) USE OF ASPIRIN: ICD-10-CM

## 2017-09-27 DIAGNOSIS — J98.4 OTHER DISORDERS OF LUNG: ICD-10-CM

## 2017-09-27 DIAGNOSIS — M48.00 SPINAL STENOSIS, SITE UNSPECIFIED: ICD-10-CM

## 2017-09-27 DIAGNOSIS — M54.31 SCIATICA, RIGHT SIDE: ICD-10-CM

## 2017-09-27 DIAGNOSIS — E87.1 HYPO-OSMOLALITY AND HYPONATREMIA: ICD-10-CM

## 2017-09-27 DIAGNOSIS — E66.9 OBESITY, UNSPECIFIED: ICD-10-CM

## 2017-09-27 DIAGNOSIS — J98.01 ACUTE BRONCHOSPASM: ICD-10-CM

## 2017-09-27 DIAGNOSIS — Z90.49 ACQUIRED ABSENCE OF OTHER SPECIFIED PARTS OF DIGESTIVE TRACT: ICD-10-CM

## 2017-09-27 DIAGNOSIS — J98.6 DISORDERS OF DIAPHRAGM: ICD-10-CM

## 2017-09-27 DIAGNOSIS — Z87.891 PERSONAL HISTORY OF NICOTINE DEPENDENCE: ICD-10-CM

## 2017-09-27 DIAGNOSIS — D72.829 ELEVATED WHITE BLOOD CELL COUNT, UNSPECIFIED: ICD-10-CM

## 2017-09-27 DIAGNOSIS — M17.12 UNILATERAL PRIMARY OSTEOARTHRITIS, LEFT KNEE: ICD-10-CM

## 2017-09-27 DIAGNOSIS — J18.9 PNEUMONIA, UNSPECIFIED ORGANISM: ICD-10-CM

## 2017-09-27 DIAGNOSIS — Z87.01 PERSONAL HISTORY OF PNEUMONIA (RECURRENT): ICD-10-CM

## 2017-09-27 DIAGNOSIS — Z79.52 LONG TERM (CURRENT) USE OF SYSTEMIC STEROIDS: ICD-10-CM
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
## 2017-09-27 DIAGNOSIS — Z86.14 PERSONAL HISTORY OF METHICILLIN RESISTANT STAPHYLOCOCCUS AUREUS INFECTION: ICD-10-CM

## 2017-09-27 DIAGNOSIS — G89.4 CHRONIC PAIN SYNDROME: ICD-10-CM

## 2017-09-27 DIAGNOSIS — Z88.1 ALLERGY STATUS TO OTHER ANTIBIOTIC AGENTS STATUS: ICD-10-CM

## 2017-09-27 DIAGNOSIS — K21.9 GASTRO-ESOPHAGEAL REFLUX DISEASE WITHOUT ESOPHAGITIS: ICD-10-CM

## 2017-09-27 DIAGNOSIS — J98.11 ATELECTASIS: ICD-10-CM

## 2017-09-27 DIAGNOSIS — M19.011 PRIMARY OSTEOARTHRITIS, RIGHT SHOULDER: ICD-10-CM

## 2017-09-27 DIAGNOSIS — T38.0X5A ADVERSE EFFECT OF GLUCOCORTICOIDS AND SYNTHETIC ANALOGUES, INITIAL ENCOUNTER: ICD-10-CM

## 2017-09-27 DIAGNOSIS — I10 ESSENTIAL (PRIMARY) HYPERTENSION: ICD-10-CM

## 2017-11-03 NOTE — ED PROVIDER NOTE - CHPI ED SYMPTOMS POS
"Subjective:   Dante Stallings [facility]. Dante Stallings today.    Keenan Robbins MD, FACC, [facility]  Division of Interventional Cardiology  [facility]     COUGH/DYSPNEA ON EXERTION

## 2017-12-21 ENCOUNTER — OUTPATIENT (OUTPATIENT)
Dept: OUTPATIENT SERVICES | Facility: HOSPITAL | Age: 62
LOS: 1 days | End: 2017-12-21
Payer: COMMERCIAL

## 2017-12-21 DIAGNOSIS — Z90.49 ACQUIRED ABSENCE OF OTHER SPECIFIED PARTS OF DIGESTIVE TRACT: Chronic | ICD-10-CM

## 2017-12-21 DIAGNOSIS — Z98.890 OTHER SPECIFIED POSTPROCEDURAL STATES: Chronic | ICD-10-CM

## 2017-12-21 DIAGNOSIS — M51.24 OTHER INTERVERTEBRAL DISC DISPLACEMENT, THORACIC REGION: ICD-10-CM

## 2017-12-21 DIAGNOSIS — G56.03 CARPAL TUNNEL SYNDROME, BILATERAL UPPER LIMBS: Chronic | ICD-10-CM

## 2017-12-21 PROCEDURE — 64479 NJX AA&/STRD TFRM EPI C/T 1: CPT | Mod: LT

## 2017-12-21 PROCEDURE — 77003 FLUOROGUIDE FOR SPINE INJECT: CPT

## 2018-06-18 ENCOUNTER — OUTPATIENT (OUTPATIENT)
Dept: OUTPATIENT SERVICES | Facility: HOSPITAL | Age: 63
LOS: 1 days | End: 2018-06-18
Payer: MEDICARE

## 2018-06-18 ENCOUNTER — APPOINTMENT (OUTPATIENT)
Dept: MRI IMAGING | Facility: CLINIC | Age: 63
End: 2018-06-18
Payer: MEDICARE

## 2018-06-18 DIAGNOSIS — Z00.8 ENCOUNTER FOR OTHER GENERAL EXAMINATION: ICD-10-CM

## 2018-06-18 DIAGNOSIS — Z98.890 OTHER SPECIFIED POSTPROCEDURAL STATES: Chronic | ICD-10-CM

## 2018-06-18 DIAGNOSIS — G56.03 CARPAL TUNNEL SYNDROME, BILATERAL UPPER LIMBS: Chronic | ICD-10-CM

## 2018-06-18 DIAGNOSIS — Z90.49 ACQUIRED ABSENCE OF OTHER SPECIFIED PARTS OF DIGESTIVE TRACT: Chronic | ICD-10-CM

## 2018-06-18 PROCEDURE — 73721 MRI JNT OF LWR EXTRE W/O DYE: CPT

## 2018-06-18 PROCEDURE — 73721 MRI JNT OF LWR EXTRE W/O DYE: CPT | Mod: 26,LT

## 2018-07-10 ENCOUNTER — OUTPATIENT (OUTPATIENT)
Dept: OUTPATIENT SERVICES | Facility: HOSPITAL | Age: 63
LOS: 1 days | End: 2018-07-10
Payer: MEDICARE

## 2018-07-10 DIAGNOSIS — Z90.49 ACQUIRED ABSENCE OF OTHER SPECIFIED PARTS OF DIGESTIVE TRACT: Chronic | ICD-10-CM

## 2018-07-10 DIAGNOSIS — Z98.890 OTHER SPECIFIED POSTPROCEDURAL STATES: Chronic | ICD-10-CM

## 2018-07-10 DIAGNOSIS — G56.03 CARPAL TUNNEL SYNDROME, BILATERAL UPPER LIMBS: Chronic | ICD-10-CM

## 2018-07-10 DIAGNOSIS — M54.16 RADICULOPATHY, LUMBAR REGION: ICD-10-CM

## 2018-07-10 PROCEDURE — 62323 NJX INTERLAMINAR LMBR/SAC: CPT

## 2018-07-10 PROCEDURE — 77003 FLUOROGUIDE FOR SPINE INJECT: CPT

## 2018-08-20 PROBLEM — M54.31 SCIATICA, RIGHT SIDE: Chronic | Status: ACTIVE | Noted: 2017-07-19

## 2018-08-20 PROBLEM — N28.1 CYST OF KIDNEY, ACQUIRED: Chronic | Status: ACTIVE | Noted: 2017-07-19

## 2018-08-20 PROBLEM — M48.00 SPINAL STENOSIS, SITE UNSPECIFIED: Chronic | Status: ACTIVE | Noted: 2017-07-19

## 2018-08-20 PROBLEM — N20.0 CALCULUS OF KIDNEY: Chronic | Status: ACTIVE | Noted: 2017-07-19

## 2018-08-20 PROBLEM — M41.9 SCOLIOSIS, UNSPECIFIED: Chronic | Status: ACTIVE | Noted: 2017-07-19

## 2018-08-20 PROBLEM — I10 ESSENTIAL (PRIMARY) HYPERTENSION: Chronic | Status: ACTIVE | Noted: 2017-07-19

## 2018-08-20 PROBLEM — M17.12 UNILATERAL PRIMARY OSTEOARTHRITIS, LEFT KNEE: Chronic | Status: ACTIVE | Noted: 2017-07-19

## 2018-08-20 PROBLEM — M19.011 PRIMARY OSTEOARTHRITIS, RIGHT SHOULDER: Chronic | Status: ACTIVE | Noted: 2017-07-19

## 2018-08-20 PROBLEM — K21.9 GASTRO-ESOPHAGEAL REFLUX DISEASE WITHOUT ESOPHAGITIS: Chronic | Status: ACTIVE | Noted: 2017-07-19

## 2018-08-20 PROBLEM — M51.26 OTHER INTERVERTEBRAL DISC DISPLACEMENT, LUMBAR REGION: Chronic | Status: ACTIVE | Noted: 2017-07-19

## 2018-08-20 PROBLEM — Z22.322 CARRIER OR SUSPECTED CARRIER OF METHICILLIN RESISTANT STAPHYLOCOCCUS AUREUS: Chronic | Status: ACTIVE | Noted: 2017-07-19

## 2018-08-20 PROBLEM — K57.90 DIVERTICULOSIS OF INTESTINE, PART UNSPECIFIED, WITHOUT PERFORATION OR ABSCESS WITHOUT BLEEDING: Chronic | Status: ACTIVE | Noted: 2017-07-19

## 2018-08-20 PROBLEM — J30.2 OTHER SEASONAL ALLERGIC RHINITIS: Chronic | Status: ACTIVE | Noted: 2017-07-19

## 2018-08-20 PROBLEM — K43.9 VENTRAL HERNIA WITHOUT OBSTRUCTION OR GANGRENE: Chronic | Status: ACTIVE | Noted: 2017-07-19

## 2018-08-23 ENCOUNTER — OUTPATIENT (OUTPATIENT)
Dept: OUTPATIENT SERVICES | Facility: HOSPITAL | Age: 63
LOS: 1 days | End: 2018-08-23
Payer: COMMERCIAL

## 2018-08-23 DIAGNOSIS — Z90.49 ACQUIRED ABSENCE OF OTHER SPECIFIED PARTS OF DIGESTIVE TRACT: Chronic | ICD-10-CM

## 2018-08-23 DIAGNOSIS — Z98.890 OTHER SPECIFIED POSTPROCEDURAL STATES: Chronic | ICD-10-CM

## 2018-08-23 DIAGNOSIS — G56.03 CARPAL TUNNEL SYNDROME, BILATERAL UPPER LIMBS: Chronic | ICD-10-CM

## 2018-08-23 DIAGNOSIS — M54.14 RADICULOPATHY, THORACIC REGION: ICD-10-CM

## 2018-08-23 PROCEDURE — 77003 FLUOROGUIDE FOR SPINE INJECT: CPT

## 2018-08-23 PROCEDURE — 64479 NJX AA&/STRD TFRM EPI C/T 1: CPT | Mod: LT

## 2018-12-23 NOTE — PROGRESS NOTE ADULT - SUBJECTIVE AND OBJECTIVE BOX
Viral Upper Respiratory Illness (Child)  Your child has a viral upper respiratory illness (URI), which is another term for the common cold. The virus is contagious during the first few days. It is spread through the air by coughing, sneezing, or by direct contact (touching your sick child then touching your own eyes, nose, or mouth). Frequent handwashing will decrease risk of spread. Most viral illnesses resolve within 7 to 14 days with rest and simple home remedies. However, they may sometimes last up to 4 weeks. Antibiotics will not kill a virus and are generally not prescribed for this condition.    Home care  · Fluids: Fever increases water loss from the body. Encourage your child to drink lots of fluids to loosen lung secretions and make it easier to breathe. For infants under 1 year old, continue regular formula or breast feedings. Between feedings, give oral rehydration solution. This is available from drugstores and grocery stores without a prescription. For children over 1 year old, give plenty of fluids, such as water, juice, gelatin water, soda without caffeine, ginger ale, lemonade, or ice pops.  · Eating: If your child doesn't want to eat solid foods, it's OK for a few days, as long as he or she drinks lots of fluid.  · Rest: Keep children with fever at home resting or playing quietly until the fever is gone. Encourage frequent naps. Your child may return to day care or school when the fever is gone and he or she is eating well and feeling better.  · Sleep: Periods of sleeplessness and irritability are common. A congested child will sleep best with the head and upper body propped up on pillows or with the head of the bed frame raised on a 6-inch block. An infant may sleep in a car seat placed in the crib or in a baby swing. If you use a car seat or baby swing, always make certain the baby is safely fastened in the device.  · Cough: Coughing is a normal part of this illness. A cool mist humidifier at  62 y-o Female with PMHx of HTN, presents to the ED c/o productive cough. Pt states she went to PMD for cough x 1.5 weeks, was given steroids finished 7 days ago. Pt states x2days ago productive cough returned with fever. Hx of Hospitalization for PNA last April. Denies upper back pain, CP, N/V/D    9/21: feeling a little better    9/22 better today, back pain better too,     9/23: better  BP elevated 180s, uncontrolled      PHYSICAL EXAM:    Daily     Daily     ICU Vital Signs Last 24 Hrs  T(C): 37.2 (23 Sep 2017 11:27), Max: 37.2 (23 Sep 2017 11:27)  T(F): 99 (23 Sep 2017 11:27), Max: 99 (23 Sep 2017 11:27)  HR: 70 (23 Sep 2017 15:06) (66 - 89)  BP: 165/83 (23 Sep 2017 15:06) (161/86 - 182/77)  BP(mean): --  ABP: --  ABP(mean): --  RR: 20 (23 Sep 2017 11:27) (16 - 20)  SpO2: 98% (23 Sep 2017 11:27) (96% - 100%)      Constitutional: Well appearing  HEENT: Atraumatic, LAURA, Normal, No congestion  Respiratory: Breath Sounds normal, no rhonchi/wheeze  Cardiovascular: N S1S2; MARY present  Gastrointestinal: Abdomen soft, non tender, Bowel Sounds present  Extremities: No edema, peripheral pulses present  Neurological: AAO x 3, no gross focal motor deficits  Skin: Non cellulitic, no rash, ulcers  Lymph Nodes: No lymphadenopathy noted  Back: No CVA tenderness   Musculoskeletal: non tender  Breasts: Deferred  Genitourinary: deferred  Rectal: Deferred                          10.5   11.4  )-----------( 273      ( 23 Sep 2017 06:56 )             31.2       CBC Full  -  ( 23 Sep 2017 06:56 )  WBC Count : 11.4 K/uL  Hemoglobin : 10.5 g/dL  Hematocrit : 31.2 %  Platelet Count - Automated : 273 K/uL  Mean Cell Volume : 87.6 fl  Mean Cell Hemoglobin : 29.4 pg  Mean Cell Hemoglobin Concentration : 33.6 gm/dL  Auto Neutrophil # : x  Auto Lymphocyte # : x  Auto Monocyte # : x  Auto Eosinophil # : x  Auto Basophil # : x  Auto Neutrophil % : x  Auto Lymphocyte % : x  Auto Monocyte % : x  Auto Eosinophil % : x  Auto Basophil % : x                                    MEDICATIONS  (STANDING):  enoxaparin Injectable 40 milliGRAM(s) SubCutaneous every 24 hours  ALBUTerol/ipratropium for Nebulization 3 milliLiter(s) Nebulizer four times a day  buDESOnide 160 MICROgram(s)/formoterol 4.5 MICROgram(s) Inhaler 2 Puff(s) Inhalation two times a day  docusate sodium 100 milliGRAM(s) Oral two times a day  senna 2 Tablet(s) Oral at bedtime  aspirin  chewable 81 milliGRAM(s) Oral daily  ATENolol  Tablet 25 milliGRAM(s) Oral at bedtime  morphine ER Tablet 30 milliGRAM(s) Oral every 12 hours  pregabalin 75 milliGRAM(s) Oral three times a day  gabapentin 300 milliGRAM(s) Oral at bedtime  amitriptyline 100 milliGRAM(s) Oral at bedtime  oxyCODONE    IR 30 milliGRAM(s) Oral three times a day  ibuprofen  Tablet 400 milliGRAM(s) Oral three times a day  pantoprazole    Tablet 20 milliGRAM(s) Oral before breakfast  methylPREDNISolone sodium succinate Injectable 40 milliGRAM(s) IV Push daily  amLODIPine   Tablet 5 milliGRAM(s) Oral daily  azithromycin   Tablet 500 milliGRAM(s) Oral daily  cefuroxime   Tablet 500 milliGRAM(s) Oral every 12 hours the bedside may be helpful. Be sure to clean the humidifier every day to prevent mold. Over-the-counter cough and cold medicines have not proved to be any more helpful than a placebo (syrup with no medicine in it). In addition, these medicines can produce serious side effects, especially in infants under 2 years of age. Do not give over-the-counter cough and cold medicines to children under 6 years unless your healthcare provider has specifically advised you to do so. Also, don’t expose your child to cigarette smoke. It can make the cough worse.  · Nasal congestion: Suction the nose of infants with a bulb syringe. You may put 2 to 3 drops of saltwater (saline) nose drops in each nostril before suctioning. This helps thin and remove secretions. Saline nose drops are available without a prescription. You can also use ¼ teaspoon of table salt dissolved in 1 cup of water.  · Fever: Use children’s acetaminophen for fever, fussiness, or discomfort, unless another medicine was prescribed. In infants over 6 months of age, you may use children’s ibuprofen or acetaminophen. (Note: If your child has chronic liver or kidney disease or has ever had a stomach ulcer or gastrointestinal bleeding, talk with your healthcare provider before using these medicines.) Aspirin should never be given to anyone younger than 18 years of age who is ill with a viral infection or fever. It may cause severe liver or brain damage.  · Preventing spread: Washing your hands before and after touching your sick child will help prevent a new infection. It will also help prevent the spread of this viral illness to yourself and other children.  Follow-up care  Follow up with your healthcare provider, or as advised.  When to seek medical advice  For a usually healthy child, call your child's healthcare provider right away if any of these occur:  · A fever, as follows:  ¨ Your child is 3 months old or younger and has a fever of 100.4°F (38°C) or higher. Get  medical care right away. Fever in a young baby can be a sign of a dangerous infection.  ¨ Your child is of any age and has repeated fevers above 104°F (40°C).  ¨ Your child is younger than 2 years of age and a fever of 100.4°F (38°C) continues for more than 1 day.  ¨ Your child is 2 years old or older and a fever of 100.4°F (38°C) continues for more than 3 days.  · Earache, sinus pain, stiff or painful neck, headache, repeated diarrhea, or vomiting.  · Unusual fussiness.  · A new rash appears.  · Your child is dehydrated, with one or more of these symptoms:  ¨ No tears when crying.  ¨ “Sunken” eyes or a dry mouth.  ¨ No wet diapers for 8 hours in infants.  ¨ Reduced urine output in older children.  Call 911, or get immediate medical care  Contact emergency services if any of these occur:  · Increased wheezing or difficulty breathing  · Unusual drowsiness or confusion  · Fast breathing, as follows:  ¨ Birth to 6 weeks: over 60 breaths per minute.  ¨ 6 weeks to 2 years: over 45 breaths per minute.  ¨ 3 to 6 years: over 35 breaths per minute.  ¨ 7 to 10 years: over 30 breaths per minute.  ¨ Older than 10 years: over 25 breaths per minute.  © 0283-3566 [employer]. [address] [place] [address], [place] [address]. All rights reserved. This information is not intended as a substitute for professional medical care. Always follow your healthcare professional's instructions.

## 2019-02-01 NOTE — H&P ADULT - GASTROINTESTINAL
Called [facility] and gave her the info  Advised her note written and placed in  folder  negative Soft, non-tender, no hepatosplenomegaly, normal bowel sounds

## 2019-02-27 ENCOUNTER — APPOINTMENT (OUTPATIENT)
Age: 64
End: 2019-02-27
Payer: MEDICARE

## 2019-02-27 ENCOUNTER — RECORD ABSTRACTING (OUTPATIENT)
Age: 64
End: 2019-02-27

## 2019-02-27 DIAGNOSIS — Z78.9 OTHER SPECIFIED HEALTH STATUS: ICD-10-CM

## 2019-02-27 DIAGNOSIS — M20.11 HALLUX VALGUS (ACQUIRED), RIGHT FOOT: ICD-10-CM

## 2019-02-27 DIAGNOSIS — M19.011 PRIMARY OSTEOARTHRITIS, RIGHT SHOULDER: ICD-10-CM

## 2019-02-27 DIAGNOSIS — Z82.5 FAMILY HISTORY OF ASTHMA AND OTHER CHRONIC LOWER RESPIRATORY DISEASES: ICD-10-CM

## 2019-02-27 DIAGNOSIS — Z86.79 PERSONAL HISTORY OF OTHER DISEASES OF THE CIRCULATORY SYSTEM: ICD-10-CM

## 2019-02-27 DIAGNOSIS — S83.412A SPRAIN OF MEDIAL COLLATERAL LIGAMENT OF LEFT KNEE, INITIAL ENCOUNTER: ICD-10-CM

## 2019-02-27 DIAGNOSIS — M25.562 PAIN IN LEFT KNEE: ICD-10-CM

## 2019-02-27 DIAGNOSIS — M75.121 COMPLETE ROTATOR CUFF TEAR OR RUPTURE OF RIGHT SHOULDER, NOT SPECIFIED AS TRAUMATIC: ICD-10-CM

## 2019-02-27 DIAGNOSIS — Z82.49 FAMILY HISTORY OF ISCHEMIC HEART DISEASE AND OTHER DISEASES OF THE CIRCULATORY SYSTEM: ICD-10-CM

## 2019-02-27 DIAGNOSIS — Z87.09 PERSONAL HISTORY OF OTHER DISEASES OF THE RESPIRATORY SYSTEM: ICD-10-CM

## 2019-02-27 DIAGNOSIS — Z80.0 FAMILY HISTORY OF MALIGNANT NEOPLASM OF DIGESTIVE ORGANS: ICD-10-CM

## 2019-02-27 DIAGNOSIS — Z87.39 PERSONAL HISTORY OF OTHER DISEASES OF THE MUSCULOSKELETAL SYSTEM AND CONNECTIVE TISSUE: ICD-10-CM

## 2019-02-27 PROCEDURE — 99214 OFFICE O/P EST MOD 30 MIN: CPT

## 2019-02-27 PROCEDURE — 73630 X-RAY EXAM OF FOOT: CPT | Mod: RT
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
## 2019-02-27 NOTE — ADDENDUM
[FreeTextEntry1] : Documented by Blanche Price acting as a scribe for Dr. Scott on 02/27/2019 \par \par All medical record entries made by the Scribe were at my, Dr. Price, direction and\par personally dictated by me on 02/27/2019 . I have reviewed the chart and agree that the record\par accurately reflects my personal performance of the history, physical exam, procedure and imaging.

## 2019-02-27 NOTE — PHYSICAL EXAM
[de-identified] : General: Alert and oriented x3. In no acute distress. Pleasant in nature with a normal affect. No apparent respiratory distress.\par \par R Foot Exam\par Skin: Clean, dry and intact.\par Inspection: No obvious malalignment, no swelling, no effusion. No lymphadenopathy. R sided Hammertoe rigid at the PIP 2nd. Hallux valgus medial eminence. Unstable medial ray. Oncocytosis L great toe. \par Pulses: 2+ DP/PT pulses\par ROM: Foot:   Full ROM of digits. Ankle: neutral degrees dorsiflexion,  30  degrees of plantarflexion, 10   degrees of subtalar motion.\par Tenderness: No tenderness over the lateral malleolus, no CFL/ATFL/PTFL pain. No medial malleolus pain, no deltoid ligament pain.  No heel pain. No Achilles tenderness. No 5th metatarsal pain. No pain to the LisFranc joint. No ttp over the posterior tibial tendon. \par Painful ROM: None\par Stability: Negative anterior/posterior drawer. \par Strength: 5/5 ADD/ABD/TA/GS/EHL/FHL/EDL\par Neuro: Intact to light touch throughout\par Additional tests: Negative Roman's test, negative tarsal tunnel tinel's, negative single heel rise.\par  [de-identified] : 3V of R foot were ordered obtained and reviewed by me today revealed hallux valgus. IM angle greater than 20 degrees.

## 2019-05-03 NOTE — DISCHARGE NOTE ADULT - PATIENT PORTAL LINK FT
“You can access the FollowHealth Patient Portal, offered by [facility], by registering with the following website: [email or web address]” Low salt diet

## 2019-07-08 ENCOUNTER — OUTPATIENT (OUTPATIENT)
Dept: OUTPATIENT SERVICES | Facility: HOSPITAL | Age: 64
LOS: 1 days | End: 2019-07-08
Payer: COMMERCIAL

## 2019-07-08 DIAGNOSIS — M54.14 RADICULOPATHY, THORACIC REGION: ICD-10-CM

## 2019-07-08 DIAGNOSIS — Z98.890 OTHER SPECIFIED POSTPROCEDURAL STATES: Chronic | ICD-10-CM

## 2019-07-08 DIAGNOSIS — Z90.49 ACQUIRED ABSENCE OF OTHER SPECIFIED PARTS OF DIGESTIVE TRACT: Chronic | ICD-10-CM

## 2019-07-08 DIAGNOSIS — G56.03 CARPAL TUNNEL SYNDROME, BILATERAL UPPER LIMBS: Chronic | ICD-10-CM

## 2019-07-08 PROCEDURE — 64479 NJX AA&/STRD TFRM EPI C/T 1: CPT | Mod: LT

## 2020-10-01 NOTE — ED STATDOCS - CHIEF COMPLAINT
Visual Acuity Screening completed on pt. Right eye - 20/15; Left eye - 20/30;  Both eyes - 20/20     Justice Sanchez RN  09/30/20 [id number]
The patient is a 61y year old Female complaining of flank pain.

## 2020-10-10 ENCOUNTER — OUTPATIENT (OUTPATIENT)
Dept: OUTPATIENT SERVICES | Facility: HOSPITAL | Age: 65
LOS: 1 days | End: 2020-10-10
Payer: COMMERCIAL

## 2020-10-10 DIAGNOSIS — Z98.890 OTHER SPECIFIED POSTPROCEDURAL STATES: Chronic | ICD-10-CM

## 2020-10-10 DIAGNOSIS — Z11.59 ENCOUNTER FOR SCREENING FOR OTHER VIRAL DISEASES: ICD-10-CM

## 2020-10-10 DIAGNOSIS — Z90.49 ACQUIRED ABSENCE OF OTHER SPECIFIED PARTS OF DIGESTIVE TRACT: Chronic | ICD-10-CM

## 2020-10-10 DIAGNOSIS — G56.03 CARPAL TUNNEL SYNDROME, BILATERAL UPPER LIMBS: Chronic | ICD-10-CM

## 2020-10-10 LAB — SARS-COV-2 RNA SPEC QL NAA+PROBE: SIGNIFICANT CHANGE UP

## 2020-10-10 PROCEDURE — U0003: CPT

## 2020-10-12 ENCOUNTER — OUTPATIENT (OUTPATIENT)
Dept: OUTPATIENT SERVICES | Facility: HOSPITAL | Age: 65
LOS: 1 days | End: 2020-10-12
Payer: COMMERCIAL

## 2020-10-12 DIAGNOSIS — G56.03 CARPAL TUNNEL SYNDROME, BILATERAL UPPER LIMBS: Chronic | ICD-10-CM

## 2020-10-12 DIAGNOSIS — Z11.59 ENCOUNTER FOR SCREENING FOR OTHER VIRAL DISEASES: ICD-10-CM

## 2020-10-12 DIAGNOSIS — Z98.890 OTHER SPECIFIED POSTPROCEDURAL STATES: Chronic | ICD-10-CM

## 2020-10-12 DIAGNOSIS — M54.14 RADICULOPATHY, THORACIC REGION: ICD-10-CM

## 2020-10-12 DIAGNOSIS — Z90.49 ACQUIRED ABSENCE OF OTHER SPECIFIED PARTS OF DIGESTIVE TRACT: Chronic | ICD-10-CM

## 2020-10-12 PROCEDURE — 62321 NJX INTERLAMINAR CRV/THRC: CPT

## 2020-10-12 PROCEDURE — 77003 FLUOROGUIDE FOR SPINE INJECT: CPT

## 2021-01-12 NOTE — ED ADULT NURSE NOTE - HARM RISK FACTORS
Detail Level: Simple Instructions: This plan will send the code FBSD to the PM system.  DO NOT or CHANGE the price. Price (Do Not Change): 0.00 no

## 2021-02-24 ENCOUNTER — OUTPATIENT (OUTPATIENT)
Dept: OUTPATIENT SERVICES | Facility: HOSPITAL | Age: 66
LOS: 1 days | End: 2021-02-24
Payer: MEDICARE

## 2021-02-24 DIAGNOSIS — Z98.890 OTHER SPECIFIED POSTPROCEDURAL STATES: Chronic | ICD-10-CM

## 2021-02-24 DIAGNOSIS — G56.03 CARPAL TUNNEL SYNDROME, BILATERAL UPPER LIMBS: Chronic | ICD-10-CM

## 2021-02-24 DIAGNOSIS — Z90.49 ACQUIRED ABSENCE OF OTHER SPECIFIED PARTS OF DIGESTIVE TRACT: Chronic | ICD-10-CM

## 2021-02-24 DIAGNOSIS — Z20.828 CONTACT WITH AND (SUSPECTED) EXPOSURE TO OTHER VIRAL COMMUNICABLE DISEASES: ICD-10-CM

## 2021-02-24 LAB — SARS-COV-2 RNA SPEC QL NAA+PROBE: SIGNIFICANT CHANGE UP

## 2021-02-24 PROCEDURE — U0005: CPT

## 2021-02-24 PROCEDURE — C9803: CPT

## 2021-02-24 PROCEDURE — U0003: CPT

## 2021-02-25 DIAGNOSIS — Z20.828 CONTACT WITH AND (SUSPECTED) EXPOSURE TO OTHER VIRAL COMMUNICABLE DISEASES: ICD-10-CM

## 2021-04-01 ENCOUNTER — INPATIENT (INPATIENT)
Facility: HOSPITAL | Age: 66
LOS: 2 days | Discharge: HOME | DRG: 189 | End: 2021-04-04
Attending: INTERNAL MEDICINE | Admitting: HOSPITALIST
Payer: MEDICARE

## 2021-04-01 ENCOUNTER — EMERGENCY (EMERGENCY)
Facility: HOSPITAL | Age: 66
LOS: 0 days | Discharge: HOME | End: 2021-04-01
Attending: EMERGENCY MEDICINE
Payer: MEDICARE

## 2021-04-01 ENCOUNTER — OUTPATIENT (OUTPATIENT)
Dept: OUTPATIENT SERVICES | Facility: HOSPITAL | Age: 66
LOS: 1 days | End: 2021-04-01

## 2021-04-01 VITALS
HEIGHT: 62 IN | TEMPERATURE: 98 F | SYSTOLIC BLOOD PRESSURE: 206 MMHG | HEART RATE: 112 BPM | RESPIRATION RATE: 18 BRPM | OXYGEN SATURATION: 99 % | DIASTOLIC BLOOD PRESSURE: 115 MMHG | WEIGHT: 164.91 LBS

## 2021-04-01 VITALS
HEART RATE: 80 BPM | RESPIRATION RATE: 16 BRPM | DIASTOLIC BLOOD PRESSURE: 89 MMHG | OXYGEN SATURATION: 97 % | SYSTOLIC BLOOD PRESSURE: 161 MMHG | TEMPERATURE: 98 F

## 2021-04-01 VITALS — HEIGHT: 62 IN

## 2021-04-01 DIAGNOSIS — Z86.14 PERSONAL HISTORY OF METHICILLIN RESISTANT STAPHYLOCOCCUS AUREUS INFECTION: ICD-10-CM

## 2021-04-01 DIAGNOSIS — Z90.49 ACQUIRED ABSENCE OF OTHER SPECIFIED PARTS OF DIGESTIVE TRACT: ICD-10-CM

## 2021-04-01 DIAGNOSIS — G56.03 CARPAL TUNNEL SYNDROME, BILATERAL UPPER LIMBS: Chronic | ICD-10-CM

## 2021-04-01 DIAGNOSIS — M17.11 UNILATERAL PRIMARY OSTEOARTHRITIS, RIGHT KNEE: ICD-10-CM

## 2021-04-01 DIAGNOSIS — R06.02 SHORTNESS OF BREATH: ICD-10-CM

## 2021-04-01 DIAGNOSIS — M19.011 PRIMARY OSTEOARTHRITIS, RIGHT SHOULDER: ICD-10-CM

## 2021-04-01 DIAGNOSIS — I10 ESSENTIAL (PRIMARY) HYPERTENSION: ICD-10-CM

## 2021-04-01 DIAGNOSIS — Z90.49 ACQUIRED ABSENCE OF OTHER SPECIFIED PARTS OF DIGESTIVE TRACT: Chronic | ICD-10-CM

## 2021-04-01 DIAGNOSIS — Z98.890 OTHER SPECIFIED POSTPROCEDURAL STATES: Chronic | ICD-10-CM

## 2021-04-01 DIAGNOSIS — Z20.822 CONTACT WITH AND (SUSPECTED) EXPOSURE TO COVID-19: ICD-10-CM

## 2021-04-01 DIAGNOSIS — K21.9 GASTRO-ESOPHAGEAL REFLUX DISEASE WITHOUT ESOPHAGITIS: ICD-10-CM

## 2021-04-01 DIAGNOSIS — J20.9 ACUTE BRONCHITIS, UNSPECIFIED: ICD-10-CM

## 2021-04-01 DIAGNOSIS — K46.9 UNSPECIFIED ABDOMINAL HERNIA WITHOUT OBSTRUCTION OR GANGRENE: ICD-10-CM

## 2021-04-01 DIAGNOSIS — K57.90 DIVERTICULOSIS OF INTESTINE, PART UNSPECIFIED, WITHOUT PERFORATION OR ABSCESS WITHOUT BLEEDING: ICD-10-CM

## 2021-04-01 DIAGNOSIS — M41.9 SCOLIOSIS, UNSPECIFIED: ICD-10-CM

## 2021-04-01 DIAGNOSIS — Z88.1 ALLERGY STATUS TO OTHER ANTIBIOTIC AGENTS STATUS: ICD-10-CM

## 2021-04-01 DIAGNOSIS — Z20.828 CONTACT WITH AND (SUSPECTED) EXPOSURE TO OTHER VIRAL COMMUNICABLE DISEASES: ICD-10-CM

## 2021-04-01 DIAGNOSIS — Z79.82 LONG TERM (CURRENT) USE OF ASPIRIN: ICD-10-CM

## 2021-04-01 DIAGNOSIS — J30.2 OTHER SEASONAL ALLERGIC RHINITIS: ICD-10-CM

## 2021-04-01 LAB
APTT BLD: 37.6 SEC — HIGH (ref 27.5–35.5)
BASOPHILS # BLD AUTO: 0.12 K/UL — SIGNIFICANT CHANGE UP (ref 0–0.2)
BASOPHILS NFR BLD AUTO: 1 % — SIGNIFICANT CHANGE UP (ref 0–2)
EOSINOPHIL # BLD AUTO: 1.13 K/UL — HIGH (ref 0–0.5)
EOSINOPHIL NFR BLD AUTO: 9.6 % — HIGH (ref 0–6)
HCT VFR BLD CALC: 43.6 % — SIGNIFICANT CHANGE UP (ref 34.5–45)
HGB BLD-MCNC: 14.7 G/DL — SIGNIFICANT CHANGE UP (ref 11.5–15.5)
IMM GRANULOCYTES NFR BLD AUTO: 0.3 % — SIGNIFICANT CHANGE UP (ref 0–1.5)
INR BLD: 1.04 RATIO — SIGNIFICANT CHANGE UP (ref 0.88–1.16)
LYMPHOCYTES # BLD AUTO: 34.4 % — SIGNIFICANT CHANGE UP (ref 13–44)
LYMPHOCYTES # BLD AUTO: 4.03 K/UL — HIGH (ref 1–3.3)
MCHC RBC-ENTMCNC: 30.3 PG — SIGNIFICANT CHANGE UP (ref 27–34)
MCHC RBC-ENTMCNC: 33.7 GM/DL — SIGNIFICANT CHANGE UP (ref 32–36)
MCV RBC AUTO: 89.9 FL — SIGNIFICANT CHANGE UP (ref 80–100)
MONOCYTES # BLD AUTO: 1.18 K/UL — HIGH (ref 0–0.9)
MONOCYTES NFR BLD AUTO: 10.1 % — SIGNIFICANT CHANGE UP (ref 2–14)
NEUTROPHILS # BLD AUTO: 5.21 K/UL — SIGNIFICANT CHANGE UP (ref 1.8–7.4)
NEUTROPHILS NFR BLD AUTO: 44.6 % — SIGNIFICANT CHANGE UP (ref 43–77)
NT-PROBNP SERPL-SCNC: 24 PG/ML — SIGNIFICANT CHANGE UP (ref 0–125)
PLATELET # BLD AUTO: 330 K/UL — SIGNIFICANT CHANGE UP (ref 150–400)
PROTHROM AB SERPL-ACNC: 12.2 SEC — SIGNIFICANT CHANGE UP (ref 10.6–13.6)
RBC # BLD: 4.85 M/UL — SIGNIFICANT CHANGE UP (ref 3.8–5.2)
RBC # FLD: 12.4 % — SIGNIFICANT CHANGE UP (ref 10.3–14.5)
SARS-COV-2 RNA SPEC QL NAA+PROBE: SIGNIFICANT CHANGE UP
TROPONIN I SERPL-MCNC: <0.015 NG/ML — SIGNIFICANT CHANGE UP (ref 0.01–0.04)
WBC # BLD: 11.71 K/UL — HIGH (ref 3.8–10.5)
WBC # FLD AUTO: 11.71 K/UL — HIGH (ref 3.8–10.5)

## 2021-04-01 PROCEDURE — 85025 COMPLETE CBC W/AUTO DIFF WBC: CPT

## 2021-04-01 PROCEDURE — 83880 ASSAY OF NATRIURETIC PEPTIDE: CPT

## 2021-04-01 PROCEDURE — 99285 EMERGENCY DEPT VISIT HI MDM: CPT | Mod: CS

## 2021-04-01 PROCEDURE — 80048 BASIC METABOLIC PNL TOTAL CA: CPT

## 2021-04-01 PROCEDURE — U0005: CPT

## 2021-04-01 PROCEDURE — U0003: CPT

## 2021-04-01 PROCEDURE — 85610 PROTHROMBIN TIME: CPT

## 2021-04-01 PROCEDURE — 71045 X-RAY EXAM CHEST 1 VIEW: CPT

## 2021-04-01 PROCEDURE — 36415 COLL VENOUS BLD VENIPUNCTURE: CPT

## 2021-04-01 PROCEDURE — 93005 ELECTROCARDIOGRAM TRACING: CPT

## 2021-04-01 PROCEDURE — 99284 EMERGENCY DEPT VISIT MOD MDM: CPT

## 2021-04-01 PROCEDURE — 85730 THROMBOPLASTIN TIME PARTIAL: CPT

## 2021-04-01 PROCEDURE — 93010 ELECTROCARDIOGRAM REPORT: CPT

## 2021-04-01 PROCEDURE — 96374 THER/PROPH/DIAG INJ IV PUSH: CPT

## 2021-04-01 PROCEDURE — 84484 ASSAY OF TROPONIN QUANT: CPT

## 2021-04-01 PROCEDURE — 93010 ELECTROCARDIOGRAM REPORT: CPT | Mod: 76

## 2021-04-01 PROCEDURE — 85379 FIBRIN DEGRADATION QUANT: CPT

## 2021-04-01 PROCEDURE — 71045 X-RAY EXAM CHEST 1 VIEW: CPT | Mod: 26

## 2021-04-01 RX ORDER — ALBUTEROL 90 UG/1
2.5 AEROSOL, METERED ORAL ONCE
Refills: 0 | Status: DISCONTINUED | OUTPATIENT
Start: 2021-04-01 | End: 2021-04-01

## 2021-04-01 RX ORDER — MAGNESIUM SULFATE 500 MG/ML
2 VIAL (ML) INJECTION ONCE
Refills: 0 | Status: COMPLETED | OUTPATIENT
Start: 2021-04-01 | End: 2021-04-01

## 2021-04-01 RX ORDER — IPRATROPIUM/ALBUTEROL SULFATE 18-103MCG
3 AEROSOL WITH ADAPTER (GRAM) INHALATION ONCE
Refills: 0 | Status: DISCONTINUED | OUTPATIENT
Start: 2021-04-01 | End: 2021-04-01

## 2021-04-01 RX ADMIN — Medication 50 GRAM(S): at 17:41

## 2021-04-01 RX ADMIN — Medication 125 MILLIGRAM(S): at 17:41

## 2021-04-01 NOTE — ED PROVIDER NOTE - ENMT NEGATIVE STATEMENT, MLM
3rd attempt SCC/COPD apt request     [facility]  [address]  [phone]  [address]    Unable to contact patient after multiple attempts. No apt made. Closing encounter. Ears: no ear pain and no hearing problems. Nose: no nasal congestion and no nasal drainage. Mouth/Throat: no dysphagia, no hoarseness and no throat pain. Neck: no lumps, no pain, no stiffness and no swollen glands.

## 2021-04-01 NOTE — ED ADULT TRIAGE NOTE - CHIEF COMPLAINT QUOTE
Pt presents to the Ed with SOB. Pt was on the swab line and came   off the line to go to the bathroom and became SOB. Pt did not make it to the bathroom,  sat down half way and was SOB. Denies chest pain, home O2.

## 2021-04-01 NOTE — ED PROVIDER NOTE - OBJECTIVE STATEMENT
pt presents to ED with 3 days intermittent dyuspnea no h/o dvt or pe . was in covid tsting line and beacemn very sob per team originally seeing her. at my evaluation pt denies fever. denies HA or neck pain. no chest pain + sob. no abd pain. no n/v/d. no urinary f/u/d. no back pain. no motor or sensory deficits. denies illicit drug use. no recent travel. no rash. no other acute issues symptoms or concerns
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
## 2021-04-01 NOTE — ED PROVIDER NOTE - PATIENT PORTAL LINK FT
You can access the FollowMyHealth Patient Portal offered by [facility] by registering at the following website: [email or web address]. By joining [employer]’s FollowMyHealth portal, you will also be able to view your health information using other applications (apps) compatible with our system.

## 2021-04-01 NOTE — ED ADULT TRIAGE NOTE - CHIEF COMPLAINT QUOTE
Ambulatory via hospital wheelchair complaining of cough and SOB. Just seen here this morning for same complaint. Patient diaphoretic and carrying on in triage, asking to be "knocked out." 97% on RA. Complains of new onset of cough and that something is in her throat. Brought straight to bed 18 for EKG and assessment. uncooperative and rude in triage. COVID negative today, denies CP.

## 2021-04-01 NOTE — ED ADULT NURSE NOTE - OBJECTIVE STATEMENT
pt came in from swab line to go to the restroom when she became very SOB while walking. she sat down and still couldn't catch her breath. she also complains of "mild chest pressure"

## 2021-04-01 NOTE — ED PROVIDER NOTE - PROGRESS NOTE DETAILS
pt feeling much better after nebs ambulating in ed hr and bp imporeved in no resp distress return to ed for intractable chest pain sob or any overall worsaening.

## 2021-04-02 DIAGNOSIS — Z20.828 CONTACT WITH AND (SUSPECTED) EXPOSURE TO OTHER VIRAL COMMUNICABLE DISEASES: ICD-10-CM

## 2021-04-02 DIAGNOSIS — R06.03 ACUTE RESPIRATORY DISTRESS: ICD-10-CM

## 2021-04-02 LAB
ANION GAP SERPL CALC-SCNC: 8 MMOL/L — SIGNIFICANT CHANGE UP (ref 5–17)
APAP SERPL-MCNC: 2 UG/ML — LOW (ref 10–30)
BASOPHILS # BLD AUTO: 0 K/UL — SIGNIFICANT CHANGE UP (ref 0–0.2)
BASOPHILS NFR BLD AUTO: 0 % — SIGNIFICANT CHANGE UP (ref 0–2)
BUN SERPL-MCNC: 11 MG/DL — SIGNIFICANT CHANGE UP (ref 7–23)
CALCIUM SERPL-MCNC: 9.3 MG/DL — SIGNIFICANT CHANGE UP (ref 8.5–10.1)
CHLORIDE SERPL-SCNC: 103 MMOL/L — SIGNIFICANT CHANGE UP (ref 96–108)
CO2 SERPL-SCNC: 26 MMOL/L — SIGNIFICANT CHANGE UP (ref 22–31)
COVID-19 SPIKE DOMAIN AB INTERP: POSITIVE
COVID-19 SPIKE DOMAIN ANTIBODY RESULT: 20.6 U/ML — HIGH
CREAT SERPL-MCNC: 0.92 MG/DL — SIGNIFICANT CHANGE UP (ref 0.5–1.3)
EOSINOPHIL # BLD AUTO: 0 K/UL — SIGNIFICANT CHANGE UP (ref 0–0.5)
EOSINOPHIL NFR BLD AUTO: 0 % — SIGNIFICANT CHANGE UP (ref 0–6)
GLUCOSE SERPL-MCNC: 297 MG/DL — HIGH (ref 70–99)
HCT VFR BLD CALC: 43.4 % — SIGNIFICANT CHANGE UP (ref 34.5–45)
HCV AB S/CO SERPL IA: 0.12 S/CO — SIGNIFICANT CHANGE UP (ref 0–0.99)
HCV AB SERPL-IMP: SIGNIFICANT CHANGE UP
HGB BLD-MCNC: 14.4 G/DL — SIGNIFICANT CHANGE UP (ref 11.5–15.5)
LYMPHOCYTES # BLD AUTO: 1.21 K/UL — SIGNIFICANT CHANGE UP (ref 1–3.3)
LYMPHOCYTES # BLD AUTO: 12 % — LOW (ref 13–44)
MCHC RBC-ENTMCNC: 29.6 PG — SIGNIFICANT CHANGE UP (ref 27–34)
MCHC RBC-ENTMCNC: 33.2 GM/DL — SIGNIFICANT CHANGE UP (ref 32–36)
MCV RBC AUTO: 89.1 FL — SIGNIFICANT CHANGE UP (ref 80–100)
MONOCYTES # BLD AUTO: 0 K/UL — SIGNIFICANT CHANGE UP (ref 0–0.9)
MONOCYTES NFR BLD AUTO: 0 % — LOW (ref 2–14)
NEUTROPHILS # BLD AUTO: 8.88 K/UL — HIGH (ref 1.8–7.4)
NEUTROPHILS NFR BLD AUTO: 88 % — HIGH (ref 43–77)
NRBC # BLD: SIGNIFICANT CHANGE UP /100 WBCS (ref 0–0)
PLATELET # BLD AUTO: 333 K/UL — SIGNIFICANT CHANGE UP (ref 150–400)
POTASSIUM SERPL-MCNC: 3.6 MMOL/L — SIGNIFICANT CHANGE UP (ref 3.5–5.3)
POTASSIUM SERPL-SCNC: 3.6 MMOL/L — SIGNIFICANT CHANGE UP (ref 3.5–5.3)
RAPID RVP RESULT: SIGNIFICANT CHANGE UP
RBC # BLD: 4.87 M/UL — SIGNIFICANT CHANGE UP (ref 3.8–5.2)
RBC # FLD: 12.1 % — SIGNIFICANT CHANGE UP (ref 10.3–14.5)
SARS-COV-2 IGG+IGM SERPL QL IA: 20.6 U/ML — HIGH
SARS-COV-2 IGG+IGM SERPL QL IA: POSITIVE
SARS-COV-2 RNA SPEC QL NAA+PROBE: SIGNIFICANT CHANGE UP
SODIUM SERPL-SCNC: 137 MMOL/L — SIGNIFICANT CHANGE UP (ref 135–145)
TROPONIN I SERPL-MCNC: <0.015 NG/ML — SIGNIFICANT CHANGE UP (ref 0.01–0.04)
WBC # BLD: 10.09 K/UL — SIGNIFICANT CHANGE UP (ref 3.8–10.5)
WBC # FLD AUTO: 10.09 K/UL — SIGNIFICANT CHANGE UP (ref 3.8–10.5)

## 2021-04-02 PROCEDURE — 86803 HEPATITIS C AB TEST: CPT

## 2021-04-02 PROCEDURE — 0225U NFCT DS DNA&RNA 21 SARSCOV2: CPT

## 2021-04-02 PROCEDURE — 99222 1ST HOSP IP/OBS MODERATE 55: CPT

## 2021-04-02 PROCEDURE — 71275 CT ANGIOGRAPHY CHEST: CPT | Mod: 26

## 2021-04-02 PROCEDURE — 94640 AIRWAY INHALATION TREATMENT: CPT

## 2021-04-02 PROCEDURE — 80307 DRUG TEST PRSMV CHEM ANLYZR: CPT

## 2021-04-02 PROCEDURE — 86769 SARS-COV-2 COVID-19 ANTIBODY: CPT

## 2021-04-02 PROCEDURE — 36415 COLL VENOUS BLD VENIPUNCTURE: CPT

## 2021-04-02 RX ORDER — TIZANIDINE 4 MG/1
1 TABLET ORAL
Qty: 0 | Refills: 0 | DISCHARGE

## 2021-04-02 RX ORDER — ALBUTEROL 90 UG/1
2 AEROSOL, METERED ORAL EVERY 6 HOURS
Refills: 0 | Status: DISCONTINUED | OUTPATIENT
Start: 2021-04-02 | End: 2021-04-04

## 2021-04-02 RX ORDER — AZITHROMYCIN 500 MG/1
500 TABLET, FILM COATED ORAL DAILY
Refills: 0 | Status: DISCONTINUED | OUTPATIENT
Start: 2021-04-02 | End: 2021-04-04

## 2021-04-02 RX ORDER — OXYCODONE HYDROCHLORIDE 5 MG/1
1 TABLET ORAL
Qty: 0 | Refills: 0 | DISCHARGE

## 2021-04-02 RX ORDER — METOPROLOL TARTRATE 50 MG
50 TABLET ORAL DAILY
Refills: 0 | Status: DISCONTINUED | OUTPATIENT
Start: 2021-04-02 | End: 2021-04-04

## 2021-04-02 RX ORDER — ALBUTEROL 90 UG/1
2 AEROSOL, METERED ORAL
Qty: 0 | Refills: 0 | DISCHARGE

## 2021-04-02 RX ORDER — ATENOLOL 25 MG/1
1 TABLET ORAL
Qty: 0 | Refills: 0 | DISCHARGE

## 2021-04-02 RX ORDER — OXYCODONE HYDROCHLORIDE 5 MG/1
15 TABLET ORAL THREE TIMES A DAY
Refills: 0 | Status: DISCONTINUED | OUTPATIENT
Start: 2021-04-02 | End: 2021-04-04

## 2021-04-02 RX ORDER — ASPIRIN/CALCIUM CARB/MAGNESIUM 324 MG
1 TABLET ORAL
Qty: 0 | Refills: 0 | DISCHARGE

## 2021-04-02 RX ORDER — ACETAMINOPHEN 500 MG
650 TABLET ORAL EVERY 6 HOURS
Refills: 0 | Status: DISCONTINUED | OUTPATIENT
Start: 2021-04-02 | End: 2021-04-04

## 2021-04-02 RX ORDER — METOPROLOL TARTRATE 50 MG
1 TABLET ORAL
Qty: 0 | Refills: 0 | DISCHARGE

## 2021-04-02 RX ORDER — FLUTICASONE PROPIONATE 50 MCG
1 SPRAY, SUSPENSION NASAL
Refills: 0 | Status: DISCONTINUED | OUTPATIENT
Start: 2021-04-02 | End: 2021-04-04

## 2021-04-02 RX ORDER — ONDANSETRON 8 MG/1
4 TABLET, FILM COATED ORAL EVERY 6 HOURS
Refills: 0 | Status: DISCONTINUED | OUTPATIENT
Start: 2021-04-02 | End: 2021-04-04

## 2021-04-02 RX ORDER — AZITHROMYCIN 500 MG/1
500 TABLET, FILM COATED ORAL ONCE
Refills: 0 | Status: COMPLETED | OUTPATIENT
Start: 2021-04-02 | End: 2021-04-02

## 2021-04-02 RX ORDER — MORPHINE SULFATE 50 MG/1
30 CAPSULE, EXTENDED RELEASE ORAL EVERY 12 HOURS
Refills: 0 | Status: DISCONTINUED | OUTPATIENT
Start: 2021-04-02 | End: 2021-04-04

## 2021-04-02 RX ORDER — ALBUTEROL 90 UG/1
2 AEROSOL, METERED ORAL ONCE
Refills: 0 | Status: COMPLETED | OUTPATIENT
Start: 2021-04-02 | End: 2021-04-02

## 2021-04-02 RX ORDER — FLUTICASONE PROPIONATE 50 MCG
1 SPRAY, SUSPENSION NASAL
Qty: 0 | Refills: 0 | DISCHARGE

## 2021-04-02 RX ORDER — HEPARIN SODIUM 5000 [USP'U]/ML
5000 INJECTION INTRAVENOUS; SUBCUTANEOUS EVERY 8 HOURS
Refills: 0 | Status: DISCONTINUED | OUTPATIENT
Start: 2021-04-02 | End: 2021-04-04

## 2021-04-02 RX ADMIN — OXYCODONE HYDROCHLORIDE 15 MILLIGRAM(S): 5 TABLET ORAL at 13:39

## 2021-04-02 RX ADMIN — Medication 200 MILLIGRAM(S): at 00:30

## 2021-04-02 RX ADMIN — Medication 75 MILLIGRAM(S): at 21:35

## 2021-04-02 RX ADMIN — ALBUTEROL 2 PUFF(S): 90 AEROSOL, METERED ORAL at 00:29

## 2021-04-02 RX ADMIN — Medication 1 SPRAY(S): at 21:36

## 2021-04-02 RX ADMIN — MORPHINE SULFATE 30 MILLIGRAM(S): 50 CAPSULE, EXTENDED RELEASE ORAL at 22:55

## 2021-04-02 RX ADMIN — Medication 40 MILLIGRAM(S): at 21:36

## 2021-04-02 RX ADMIN — MORPHINE SULFATE 30 MILLIGRAM(S): 50 CAPSULE, EXTENDED RELEASE ORAL at 21:35

## 2021-04-02 RX ADMIN — Medication 40 MILLIGRAM(S): at 08:21

## 2021-04-02 RX ADMIN — HEPARIN SODIUM 5000 UNIT(S): 5000 INJECTION INTRAVENOUS; SUBCUTANEOUS at 06:06

## 2021-04-02 RX ADMIN — Medication 75 MILLIGRAM(S): at 06:06

## 2021-04-02 RX ADMIN — AZITHROMYCIN 255 MILLIGRAM(S): 500 TABLET, FILM COATED ORAL at 02:03

## 2021-04-02 RX ADMIN — MORPHINE SULFATE 30 MILLIGRAM(S): 50 CAPSULE, EXTENDED RELEASE ORAL at 09:52

## 2021-04-02 RX ADMIN — HEPARIN SODIUM 5000 UNIT(S): 5000 INJECTION INTRAVENOUS; SUBCUTANEOUS at 21:36

## 2021-04-02 RX ADMIN — Medication 1 SPRAY(S): at 09:52

## 2021-04-02 RX ADMIN — Medication 75 MILLIGRAM(S): at 13:42

## 2021-04-02 RX ADMIN — HEPARIN SODIUM 5000 UNIT(S): 5000 INJECTION INTRAVENOUS; SUBCUTANEOUS at 13:40

## 2021-04-02 RX ADMIN — AZITHROMYCIN 500 MILLIGRAM(S): 500 TABLET, FILM COATED ORAL at 09:52

## 2021-04-02 RX ADMIN — Medication 50 MILLIGRAM(S): at 08:26

## 2021-04-02 RX ADMIN — Medication 200 MILLIGRAM(S): at 21:36

## 2021-04-02 RX ADMIN — Medication 40 MILLIGRAM(S): at 13:40

## 2021-04-02 RX ADMIN — Medication 200 MILLIGRAM(S): at 09:53

## 2021-04-02 RX ADMIN — Medication 40 MILLIGRAM(S): at 02:02

## 2021-04-02 NOTE — H&P ADULT - HISTORY OF PRESENT ILLNESS
64 yo Female with a former history of smoking - quit 8 years ago; hx of multiple left knee surgeries, hx of right knee injury 2 weeks ago presents with wheezing, cough, and shortness of breath X 1.5 weeks.  Patient was in the ED yesterday with the same complaint.  She states she was treated outpatient for bronchitis in late February and cough improved but wheezing and shortness of breath worsening.  Patient feels better with oxygen in place.  States she is anxious at home, lives alone and feels like she can't breathe. Patient was discharged yesterday and picked up prescription for prednisone and took 20mg tab last night.  She also received Solu-medrol in ED. But her SOB got worse at home, so she came back. No fever at home. No sick contact. No Recent travel.

## 2021-04-02 NOTE — H&P ADULT - NSICDXPASTSURGICALHX_GEN_ALL_CORE_FT
PAST SURGICAL HISTORY:  Bilateral carpal tunnel syndrome 2006, 2007    History of colon resection Colon Resection - 2005    History of left knee surgery x 6 Left Knee surgery -2013    S/P rotator cuff repair right

## 2021-04-02 NOTE — ED PROVIDER NOTE - OBJECTIVE STATEMENT
Pt. is a 64 yo F with a former history of smoking - quit 8 years ago; hx of multiple left knee surgeries, hx of right knee injury 2 weeks ago presents with wheezing, cough, and shortness of breath X 1.5 weeks.  Patient was in the ED yesterday with the same complaint.  She states she was treated outpatient for bronchitis in late February and cough improved but wheezing and shortness of breath worsening.  Patient feels better with oxygen in place.  States she is anxious at home, lives alone and feels like she can't breathe. Patient was discharged yesterday and picked up prescription for prednisone and took 20mg tab last night.  She also received Solu-medrol in ED.

## 2021-04-02 NOTE — ED ADULT NURSE NOTE - OBJECTIVE STATEMENT
pt came in complaining of SOB she was here yesterday with same complaints and states she was "feeling a little better but then worse" and "didn't feel comfortable at home alone"

## 2021-04-02 NOTE — ED PROVIDER NOTE - ENMT, MLM
Airway patent, +slight hoarseness . Nasal mucosa clear. Mouth with normal moist mucosa. Throat has no vesicles, no oropharyngeal exudates and uvula is midline.

## 2021-04-02 NOTE — H&P ADULT - ASSESSMENT
A/P:    1.  Reactive Airway disease exacerbation  Hypoxia  Bronchiolitis  -started on IV Solumedrol  -give Albuterol inhaler as needed  -on NC oxygen supplement  -PO Zithromax  -follow clinically    2.  Chronic pain syndrome  -on PO Morphine, Oxycodone and Lyrica    3.  Heparin for DVT ppx    4.  Code status: She is in Full code status.

## 2021-04-02 NOTE — ED PROVIDER NOTE - CLINICAL SUMMARY MEDICAL DECISION MAKING FREE TEXT BOX
Likely Acute COPD exacerbation. Yesterday d-dimer slightly elevated.  EKG showing sinus tachycardia.  Will give albuterol and robitussin and will likely need to admit due to oxygen requirement.      CTA chest to be ordered to r/o PE.

## 2021-04-02 NOTE — ED PROVIDER NOTE - RESPIRATORY, MLM
Diffuse expiratory wheezing; restricted air movement bilaterally.  No retractions.  Able to speak full sentences with conversational dyspnea

## 2021-04-02 NOTE — H&P ADULT - NEUROLOGICAL DETAILS
alert and oriented x 3/responds to pain/responds to verbal commands/sensation intact/deep reflexes intact/cranial nerves intact/normal strength

## 2021-04-02 NOTE — H&P ADULT - NSICDXPASTMEDICALHX_GEN_ALL_CORE_FT
PAST MEDICAL HISTORY:  Diverticulosis of intestine without bleeding, unspecified intestinal tract location Colon resection    Essential hypertension     Gastroesophageal reflux disease, esophagitis presence not specified     Hernia of anterior abdominal wall     Kidney cysts benign    Kidney stone on right side     Lumbar herniated disc     MRSA (methicillin resistant staph aureus) culture positive 2011    Osteoarthritis of left knee, unspecified osteoarthritis type     Osteoarthritis of right shoulder, unspecified osteoarthritis type     Sciatica of right side     Scoliosis of thoracic spine, unspecified scoliosis type     Seasonal allergic rhinitis, unspecified allergic rhinitis trigger     Spinal stenosis, unspecified spinal region

## 2021-04-02 NOTE — H&P ADULT - NSICDXFAMILYHX_GEN_ALL_CORE_FT
FAMILY HISTORY:  Father  Still living? No  Family history of pancreatic cancer, Age at diagnosis: Age Unknown    Mother  Still living? No  Maternal family history of emphysema, Age at diagnosis: Age Unknown

## 2021-04-02 NOTE — H&P ADULT - NSHPPHYSICALEXAM_GEN_ALL_CORE
Vital Signs Last 24 Hrs  T(C): 36.9 (02 Apr 2021 00:29), Max: 37.1 (01 Apr 2021 23:46)  T(F): 98.5 (02 Apr 2021 00:29), Max: 98.7 (01 Apr 2021 23:46)  HR: 90 (02 Apr 2021 00:29) (80 - 112)  BP: 152/94 (02 Apr 2021 00:29) (152/94 - 206/115)  BP(mean): 130 (01 Apr 2021 23:46) (130 - 130)  RR: 19 (02 Apr 2021 00:29) (16 - 19)  SpO2: 100% (02 Apr 2021 00:29) (97% - 100%)

## 2021-04-02 NOTE — ED PROVIDER NOTE - CARE PLAN
Principal Discharge DX:	Acute respiratory distress  Secondary Diagnosis:	Hypoxia  Secondary Diagnosis:	Bronchiolitis

## 2021-04-02 NOTE — ED PROVIDER NOTE - CONSTITUTIONAL, MLM
Well appearing, awake, alert, oriented to person, place, time/situation; conversational dyspnea normal...

## 2021-04-02 NOTE — ED PROVIDER NOTE - PROGRESS NOTE DETAILS
Patient ambulated to the bathroom and upon return back in bed; pulse ox was 93-94 %.  Improved to 97% with nasal cannula JG:  Patient agitated and constantly trying to get out of the stretcher.  Patient is yelling.  Patient screaming that her right hand hurts after being adjusted in bed by staff.  Xray hand to be done in AM.  Patient screaming that she wants to go home now.  Plan for  from son in AM.

## 2021-04-02 NOTE — PATIENT PROFILE ADULT - PROVIDER NAME
Pharmacist Note - Vancomycin Dosing  Therapy day 26  Indication: necrotizing fasciitis   Current regimen: 1750 mg IV Q24H    A Trough Level resulted at 12.5 mcg/mL which was obtained ~24 hrs post-dose. I suspect trough level from 8/26 did not reflect a true trough. Goal trough: 15 - 20 mcg/mL     Plan: Change to 2000 mg IV Q24H . Pharmacy will continue to monitor this patient daily for changes in clinical status and renal function.     Jam Chirinos, PharmD Dr Vivian Rodarte

## 2021-04-03 PROCEDURE — 99233 SBSQ HOSP IP/OBS HIGH 50: CPT

## 2021-04-03 RX ORDER — ALPRAZOLAM 0.25 MG
0.5 TABLET ORAL THREE TIMES A DAY
Refills: 0 | Status: DISCONTINUED | OUTPATIENT
Start: 2021-04-03 | End: 2021-04-04

## 2021-04-03 RX ADMIN — OXYCODONE HYDROCHLORIDE 15 MILLIGRAM(S): 5 TABLET ORAL at 05:46

## 2021-04-03 RX ADMIN — HEPARIN SODIUM 5000 UNIT(S): 5000 INJECTION INTRAVENOUS; SUBCUTANEOUS at 05:47

## 2021-04-03 RX ADMIN — Medication 1 SPRAY(S): at 09:58

## 2021-04-03 RX ADMIN — Medication 0.5 MILLIGRAM(S): at 19:02

## 2021-04-03 RX ADMIN — Medication 75 MILLIGRAM(S): at 21:56

## 2021-04-03 RX ADMIN — Medication 40 MILLIGRAM(S): at 05:47

## 2021-04-03 RX ADMIN — Medication 75 MILLIGRAM(S): at 05:46

## 2021-04-03 RX ADMIN — MORPHINE SULFATE 30 MILLIGRAM(S): 50 CAPSULE, EXTENDED RELEASE ORAL at 21:56

## 2021-04-03 RX ADMIN — AZITHROMYCIN 500 MILLIGRAM(S): 500 TABLET, FILM COATED ORAL at 09:58

## 2021-04-03 RX ADMIN — Medication 50 MILLIGRAM(S): at 09:58

## 2021-04-03 RX ADMIN — MORPHINE SULFATE 30 MILLIGRAM(S): 50 CAPSULE, EXTENDED RELEASE ORAL at 09:58

## 2021-04-03 RX ADMIN — Medication 30 MILLIGRAM(S): at 21:56

## 2021-04-03 RX ADMIN — Medication 200 MILLIGRAM(S): at 21:57

## 2021-04-03 RX ADMIN — Medication 75 MILLIGRAM(S): at 14:10

## 2021-04-03 RX ADMIN — Medication 1 SPRAY(S): at 21:57

## 2021-04-03 RX ADMIN — HEPARIN SODIUM 5000 UNIT(S): 5000 INJECTION INTRAVENOUS; SUBCUTANEOUS at 14:10

## 2021-04-03 RX ADMIN — HEPARIN SODIUM 5000 UNIT(S): 5000 INJECTION INTRAVENOUS; SUBCUTANEOUS at 21:56

## 2021-04-03 RX ADMIN — OXYCODONE HYDROCHLORIDE 15 MILLIGRAM(S): 5 TABLET ORAL at 14:12

## 2021-04-03 RX ADMIN — Medication 200 MILLIGRAM(S): at 05:47

## 2021-04-03 RX ADMIN — OXYCODONE HYDROCHLORIDE 15 MILLIGRAM(S): 5 TABLET ORAL at 15:00

## 2021-04-03 NOTE — PROGRESS NOTE ADULT - SUBJECTIVE AND OBJECTIVE BOX
History of Present Illness:   66 yo Female with a former history of smoking - quit 8 years ago; hx of multiple left knee surgeries, hx of right knee injury 2 weeks ago presents with wheezing, cough, and shortness of breath X 1.5 weeks.  Patient was in the ED yesterday with the same complaint.  She states she was treated outpatient for bronchitis in late February and cough improved but wheezing and shortness of breath worsening.  Patient feels better with oxygen in place.  States she is anxious at home, lives alone and feels like she can't breathe. Patient was discharged yesterday and picked up prescription for prednisone and took 20mg tab last night.  She also received Solu-medrol in ED. But her SOB got worse at home, so she came back. No fever at home. No sick contact. No Recent travel. A/P    4.3: anxious, no other distress, no resp distress            REVIEW OF SYSTEMS:    CONSTITUTIONAL: No weakness, No fevers or chills  ENT: No ear ache, No sorethroat  NECK: No pain, No stiffness  RESPIRATORY: No cough, No wheezing, No hemoptysis; No dyspnea  CARDIOVASCULAR: No chest pain, No palpitations  GASTROINTESTINAL: No abd pain, No nausea, No vomiting, No hematemesis, No diarrhea or constipation. No melena, No hematochezia.  GENITOURINARY: No dysuria, No  hematuria  NEUROLOGICAL: No diplopia, No paresthesia, No motor dysfunction  MUSCULOSKELETAL: No arthralgia, No myalgia  SKIN: No rashes, or lesions   PSYCH: no anxiety, no suicidal ideation    All other review of systems is negative unless indicated above    Vital Signs Last 24 Hrs  T(C): 37.2 (03 Apr 2021 07:49), Max: 37.5 (02 Apr 2021 21:03)  T(F): 99 (03 Apr 2021 07:49), Max: 99.5 (02 Apr 2021 21:03)  HR: 96 (03 Apr 2021 07:49) (91 - 96)  BP: 150/87 (03 Apr 2021 07:49) (126/71 - 150/87)  BP(mean): --  RR: 18 (03 Apr 2021 07:49) (18 - 18)  SpO2: 96% (03 Apr 2021 07:49) (96% - 97%)    PHYSICAL EXAM:    GENERAL: NAD, Well nourished  HEENT:  NC/AT, EOMI, PERRLA, No scleral icterus, Moist mucous membranes  NECK: Supple, No JVD  CNS:  Alert & Oriented X3, Motor Strength 5/5 B/L upper and lower extremities; DTRs 2+ intact   LUNG: Normal Breath sounds, Clear to auscultation bilaterally, No rales, No rhonchi, No wheezing  HEART: RRR; No murmurs, No rubs  ABDOMEN: +BS, ST/ND/NT  GENITOURINARY: Voiding, Bladder not distended  EXTREMITIES:  2+ Peripheral Pulses, No clubbing, No cyanosis, No tibial edema  MUSCULOSKELTAL: Joints normal ROM, No TTP, No effusion  VAGINAL: deferred  SKIN: no rashes  RECTAL: deferred, not indicated  BREAST: deferred                          14.4   10.09 )-----------( 333      ( 02 Apr 2021 00:13 )             43.4     04-02    137  |  103  |  11  ----------------------------<  297<H>  3.6   |  26  |  0.92    Ca    9.3      02 Apr 2021 00:13      Vancomycin levels:   Cultures:     MEDICATIONS  (STANDING):  azithromycin   Tablet 500 milliGRAM(s) Oral daily  fluticasone propionate 50 MICROgram(s)/spray Nasal Spray 1 Spray(s) Both Nostrils two times a day  heparin   Injectable 5000 Unit(s) SubCutaneous every 8 hours  methylPREDNISolone sodium succinate Injectable 40 milliGRAM(s) IV Push every 8 hours  metoprolol succinate ER 50 milliGRAM(s) Oral daily  morphine ER Tablet 30 milliGRAM(s) Oral every 12 hours  pregabalin 75 milliGRAM(s) Oral three times a day    MEDICATIONS  (PRN):  acetaminophen   Tablet .. 650 milliGRAM(s) Oral every 6 hours PRN Mild Pain (1 - 3)  ALBUTerol    90 MICROgram(s) HFA Inhaler 2 Puff(s) Inhalation every 6 hours PRN Shortness of Breath and/or Wheezing  guaiFENesin   Syrup  (Sugar-Free) 200 milliGRAM(s) Oral every 6 hours PRN Cough  ondansetron Injectable 4 milliGRAM(s) IV Push every 6 hours PRN Nausea and/or Vomiting  oxyCODONE    IR 15 milliGRAM(s) Oral three times a day PRN Severe Pain (7 - 10)      all labs reviewed  all imaging reviewed        1. Reactive Airway disease exacerbation  Hypoxia  Bronchiolitis  -decrease IV Solumedrol  -give Albuterol inhaler as needed  -on NC oxygen supplement  -PO Zithromax  -follow clinically    2.Chronic pain syndrome  -on PO Morphine, Oxycodone and Lyrica    3.Heparin for DVT ppx    4. Anxiety:  decrease steroids     5. Code status: She is in Full code status.

## 2021-04-04 ENCOUNTER — TRANSCRIPTION ENCOUNTER (OUTPATIENT)
Age: 66
End: 2021-04-04

## 2021-04-04 VITALS
RESPIRATION RATE: 18 BRPM | HEART RATE: 88 BPM | OXYGEN SATURATION: 95 % | DIASTOLIC BLOOD PRESSURE: 91 MMHG | SYSTOLIC BLOOD PRESSURE: 156 MMHG | TEMPERATURE: 99 F

## 2021-04-04 PROCEDURE — 99239 HOSP IP/OBS DSCHRG MGMT >30: CPT

## 2021-04-04 RX ADMIN — Medication 30 MILLIGRAM(S): at 09:46

## 2021-04-04 RX ADMIN — AZITHROMYCIN 500 MILLIGRAM(S): 500 TABLET, FILM COATED ORAL at 09:46

## 2021-04-04 RX ADMIN — HEPARIN SODIUM 5000 UNIT(S): 5000 INJECTION INTRAVENOUS; SUBCUTANEOUS at 06:22

## 2021-04-04 RX ADMIN — OXYCODONE HYDROCHLORIDE 15 MILLIGRAM(S): 5 TABLET ORAL at 14:06

## 2021-04-04 RX ADMIN — OXYCODONE HYDROCHLORIDE 15 MILLIGRAM(S): 5 TABLET ORAL at 14:51

## 2021-04-04 RX ADMIN — Medication 75 MILLIGRAM(S): at 06:23

## 2021-04-04 RX ADMIN — MORPHINE SULFATE 30 MILLIGRAM(S): 50 CAPSULE, EXTENDED RELEASE ORAL at 09:46

## 2021-04-04 RX ADMIN — OXYCODONE HYDROCHLORIDE 15 MILLIGRAM(S): 5 TABLET ORAL at 06:22

## 2021-04-04 RX ADMIN — Medication 50 MILLIGRAM(S): at 09:45

## 2021-04-04 RX ADMIN — Medication 1 SPRAY(S): at 09:46

## 2021-04-04 NOTE — DISCHARGE NOTE PROVIDER - HOSPITAL COURSE
History of Present Illness:   64 yo Female with a former history of smoking - quit 8 years ago; hx of multiple left knee surgeries, hx of right knee injury 2 weeks ago presents with wheezing, cough, and shortness of breath X 1.5 weeks.  Patient was in the ED yesterday with the same complaint.  She states she was treated outpatient for bronchitis in late February and cough improved but wheezing and shortness of breath worsening.  Patient feels better with oxygen in place.  States she is anxious at home, lives alone and feels like she can't breathe. Patient was discharged yesterday and picked up prescription for prednisone and took 20mg tab last night.  She also received Solu-medrol in ED. But her SOB got worse at home, so she came back. No fever at home. No sick contact. No Recent travel. A/P    4.3: anxious, no other distress, no resp distress  4.4: less anxious, no distress           REVIEW OF SYSTEMS:    CONSTITUTIONAL: No weakness, No fevers or chills  ENT: No ear ache, No sorethroat  NECK: No pain, No stiffness  RESPIRATORY: No cough, No wheezing, No hemoptysis; No dyspnea  CARDIOVASCULAR: No chest pain, No palpitations  GASTROINTESTINAL: No abd pain, No nausea, No vomiting, No hematemesis, No diarrhea or constipation. No melena, No hematochezia.  GENITOURINARY: No dysuria, No  hematuria  NEUROLOGICAL: No diplopia, No paresthesia, No motor dysfunction  MUSCULOSKELETAL: No arthralgia, No myalgia  SKIN: No rashes, or lesions   PSYCH: no anxiety, no suicidal ideation    All other review of systems is negative unless indicated above    Vital Signs Last 24 Hrs  T(C): 37.2 (03 Apr 2021 07:49), Max: 37.5 (02 Apr 2021 21:03)  T(F): 99 (03 Apr 2021 07:49), Max: 99.5 (02 Apr 2021 21:03)  HR: 96 (03 Apr 2021 07:49) (91 - 96)  BP: 150/87 (03 Apr 2021 07:49) (126/71 - 150/87)  BP(mean): --  RR: 18 (03 Apr 2021 07:49) (18 - 18)  SpO2: 96% (03 Apr 2021 07:49) (96% - 97%)    PHYSICAL EXAM:    GENERAL: NAD, Well nourished  HEENT:  NC/AT, EOMI, PERRLA, No scleral icterus, Moist mucous membranes  NECK: Supple, No JVD  CNS:  Alert & Oriented X3, Motor Strength 5/5 B/L upper and lower extremities; DTRs 2+ intact   LUNG: Normal Breath sounds, Clear to auscultation bilaterally, No rales, No rhonchi, No wheezing  HEART: RRR; No murmurs, No rubs  ABDOMEN: +BS, ST/ND/NT  GENITOURINARY: Voiding, Bladder not distended  EXTREMITIES:  2+ Peripheral Pulses, No clubbing, No cyanosis, No tibial edema  MUSCULOSKELTAL: Joints normal ROM, No TTP, No effusion  VAGINAL: deferred  SKIN: no rashes  RECTAL: deferred, not indicated  BREAST: deferred    a/p:    1. Acute hypoxic resp failure due to  Reactive Airway disease exacerbation  Asthmatiform Bronchitis   -decreased IV Solumedrol; will dc on Prednisone taper  -Albuterol inhaler as needed  -OSat normal   -PO Zithromax      2.Chronic pain syndrome  -on PO Morphine, Oxycodone and Lyrica    dc planning , time 37m

## 2021-04-04 NOTE — DISCHARGE NOTE NURSING/CASE MANAGEMENT/SOCIAL WORK - PATIENT PORTAL LINK FT
You can access the FollowMyHealth Patient Portal offered by [facility] by registering at the following website: [email or web address]. By joining [employer]’s FollowMyHealth portal, you will also be able to view your health information using other applications (apps) compatible with our system.

## 2021-04-04 NOTE — DISCHARGE NOTE PROVIDER - NSDCCPCAREPLAN_GEN_ALL_CORE_FT
PRINCIPAL DISCHARGE DIAGNOSIS  Diagnosis: Acute respiratory distress  Assessment and Plan of Treatment:       SECONDARY DISCHARGE DIAGNOSES  Diagnosis: Hypoxia  Assessment and Plan of Treatment:     Diagnosis: Bronchiolitis  Assessment and Plan of Treatment:

## 2021-04-04 NOTE — DISCHARGE NOTE PROVIDER - NSDCMRMEDTOKEN_GEN_ALL_CORE_FT
fluticasone 50 mcg/inh nasal spray: 1 spray(s) nasal once a day  metoprolol succinate 50 mg oral tablet, extended release: 1 tab(s) orally once a day  morphine 30 mg/12 hr oral tablet, extended release: 1 tab(s) orally every 12 hours  oxyCODONE 15 mg oral tablet: 1 tab(s) orally every 8 hours, As Needed  predniSONE 10 mg oral tablet: 4 tab(s) orally once a day x 2d  3 tb po daily x 2d  2tb po daily x 2d  1tb po daily x 2d  pregabalin 75 mg oral capsule: 1 cap(s) orally 3 times a day  tiZANidine 4 mg oral tablet: 1 tab(s) orally every 8 hours, As Needed  Ventolin HFA 90 mcg/inh inhalation aerosol: 2 puff(s) inhaled every 6 hours

## 2021-04-09 DIAGNOSIS — J45.901 UNSPECIFIED ASTHMA WITH (ACUTE) EXACERBATION: ICD-10-CM

## 2021-04-09 DIAGNOSIS — Z87.891 PERSONAL HISTORY OF NICOTINE DEPENDENCE: ICD-10-CM

## 2021-04-09 DIAGNOSIS — F41.9 ANXIETY DISORDER, UNSPECIFIED: ICD-10-CM

## 2021-04-09 DIAGNOSIS — M19.011 PRIMARY OSTEOARTHRITIS, RIGHT SHOULDER: ICD-10-CM

## 2021-04-09 DIAGNOSIS — Z90.49 ACQUIRED ABSENCE OF OTHER SPECIFIED PARTS OF DIGESTIVE TRACT: ICD-10-CM

## 2021-04-09 DIAGNOSIS — G89.4 CHRONIC PAIN SYNDROME: ICD-10-CM

## 2021-04-09 DIAGNOSIS — Z79.82 LONG TERM (CURRENT) USE OF ASPIRIN: ICD-10-CM

## 2021-04-09 DIAGNOSIS — M51.26 OTHER INTERVERTEBRAL DISC DISPLACEMENT, LUMBAR REGION: ICD-10-CM

## 2021-04-09 DIAGNOSIS — K21.9 GASTRO-ESOPHAGEAL REFLUX DISEASE WITHOUT ESOPHAGITIS: ICD-10-CM

## 2021-04-09 DIAGNOSIS — J96.01 ACUTE RESPIRATORY FAILURE WITH HYPOXIA: ICD-10-CM

## 2021-04-09 DIAGNOSIS — J21.9 ACUTE BRONCHIOLITIS, UNSPECIFIED: ICD-10-CM

## 2021-04-09 DIAGNOSIS — I10 ESSENTIAL (PRIMARY) HYPERTENSION: ICD-10-CM

## 2021-04-22 ENCOUNTER — APPOINTMENT (OUTPATIENT)
Dept: ORTHOPEDIC SURGERY | Facility: CLINIC | Age: 66
End: 2021-04-22
Payer: MEDICARE

## 2021-04-22 VITALS
HEIGHT: 62 IN | HEART RATE: 94 BPM | WEIGHT: 165 LBS | SYSTOLIC BLOOD PRESSURE: 194 MMHG | DIASTOLIC BLOOD PRESSURE: 98 MMHG | BODY MASS INDEX: 30.36 KG/M2

## 2021-04-22 DIAGNOSIS — M17.11 UNILATERAL PRIMARY OSTEOARTHRITIS, RIGHT KNEE: ICD-10-CM

## 2021-04-22 PROCEDURE — 73560 X-RAY EXAM OF KNEE 1 OR 2: CPT | Mod: RT

## 2021-04-22 PROCEDURE — 99213 OFFICE O/P EST LOW 20 MIN: CPT

## 2021-04-27 NOTE — HISTORY OF PRESENT ILLNESS
[de-identified] : The patient comes in today for her right knee.  She states her left knee is feeling great, but she is having persistent and progressive complaints to her right knee.\par

## 2021-04-27 NOTE — PHYSICAL EXAM
[de-identified] : Right Knee: Range of Motion in Degrees	\par 	                  Claimant:	Normal:	\par Flexion Active	  120	                135-degrees	\par Flexion Passive	  120	                135-degrees	\par Extension Active	  10	                0-5-degrees	\par Extension Passive	  10	                0-5-degrees	\par \par No weakness to flexion/extension.  No evidence of instability in the AP plane or varus or valgus stress.  Negative  Lachman.  Negative pivot shift.  Negative anterior drawer test.  Negative posterior drawer test.  Negative [place].  Negative Apley grind.  No medial or lateral joint line tenderness.  Positive tenderness over the medial and lateral facet of the patella.  Positive patellofemoral crepitations.  No lateral tilting patella.  No patella apprehension.  Positive crepitation in the medial and lateral femoral condyle.  No proximal or distal swelling, edema or tenderness.  No gross motor or sensory deficits.  Mild intra-articular swelling.  2+ DP and PT pulses.  No varus or valgus malalignment.  Skin is intact.  No rashes, scars or lesions.  \par   [de-identified] : Gait and Station:  Ambulating with a slightly antalgic to antalgic gait.  Normal Station.  [de-identified] : Appearance:  Well developed, well-nourished female in no acute distress.\par   [de-identified] : Radiographs, one to two views of the right knee, show severe arthritic changes and a loose body in the suprapatellar pouch.\par

## 2021-04-27 NOTE — DISCUSSION/SUMMARY
[de-identified] : At this time, due to end-stage osteoarthritis with bone-on-bone articulations, I recommended a right total knee arthroplasty.  All the risks and benefits of the surgery, including, but not limited to, anesthesia, infection, nerve and/or vascular injury, need for further surgery, DVT, PE and perioperative death, were all discussed with the patient at length.  In light of these, the patient wishes to proceed.  The patient will be scheduled at this time. \par \par I reviewed the plan of care, as well as a model of a total knee implant equivalent to the one that will be used for their total knee joint replacement.\par \par The patient agreed to the plan of care, as well as the use of implants for their knee total joint replacement.\par \par \par \par \par

## 2021-04-27 NOTE — ADDENDUM
[FreeTextEntry1] : This note was written by Gregg Morejon on 04/27/2021, acting as a scribe for Bj Bowen III, MD

## 2021-05-18 ENCOUNTER — OUTPATIENT (OUTPATIENT)
Dept: OUTPATIENT SERVICES | Facility: HOSPITAL | Age: 66
LOS: 1 days | End: 2021-05-18
Payer: COMMERCIAL

## 2021-05-18 DIAGNOSIS — Z98.890 OTHER SPECIFIED POSTPROCEDURAL STATES: Chronic | ICD-10-CM

## 2021-05-18 DIAGNOSIS — Z90.49 ACQUIRED ABSENCE OF OTHER SPECIFIED PARTS OF DIGESTIVE TRACT: Chronic | ICD-10-CM

## 2021-05-18 DIAGNOSIS — G56.03 CARPAL TUNNEL SYNDROME, BILATERAL UPPER LIMBS: Chronic | ICD-10-CM

## 2021-05-18 DIAGNOSIS — Z20.828 CONTACT WITH AND (SUSPECTED) EXPOSURE TO OTHER VIRAL COMMUNICABLE DISEASES: ICD-10-CM

## 2021-05-18 LAB — SARS-COV-2 RNA SPEC QL NAA+PROBE: SIGNIFICANT CHANGE UP

## 2021-05-18 PROCEDURE — 87635 SARS-COV-2 COVID-19 AMP PRB: CPT

## 2021-05-20 ENCOUNTER — OUTPATIENT (OUTPATIENT)
Dept: OUTPATIENT SERVICES | Facility: HOSPITAL | Age: 66
LOS: 1 days | End: 2021-05-20
Payer: COMMERCIAL

## 2021-05-20 DIAGNOSIS — M54.14 RADICULOPATHY, THORACIC REGION: ICD-10-CM

## 2021-05-20 DIAGNOSIS — Z98.890 OTHER SPECIFIED POSTPROCEDURAL STATES: Chronic | ICD-10-CM

## 2021-05-20 DIAGNOSIS — G56.03 CARPAL TUNNEL SYNDROME, BILATERAL UPPER LIMBS: Chronic | ICD-10-CM

## 2021-05-20 DIAGNOSIS — Z90.49 ACQUIRED ABSENCE OF OTHER SPECIFIED PARTS OF DIGESTIVE TRACT: Chronic | ICD-10-CM

## 2021-05-21 PROCEDURE — 62321 NJX INTERLAMINAR CRV/THRC: CPT

## 2021-12-29 NOTE — PATIENT PROFILE ADULT. - ASSIST WITH
Please give Decadron and albuterol neb. I will go back in after that.     Please bring in TWO spacers.    Then, make next 2 appointments (see yellow sheet) and Give COVID vaccine.   
walking/standing

## 2022-08-12 ENCOUNTER — OUTPATIENT (OUTPATIENT)
Dept: OUTPATIENT SERVICES | Facility: HOSPITAL | Age: 67
LOS: 1 days | End: 2022-08-12
Payer: COMMERCIAL

## 2022-08-12 DIAGNOSIS — G56.03 CARPAL TUNNEL SYNDROME, BILATERAL UPPER LIMBS: Chronic | ICD-10-CM

## 2022-08-12 DIAGNOSIS — Z98.890 OTHER SPECIFIED POSTPROCEDURAL STATES: Chronic | ICD-10-CM

## 2022-08-12 DIAGNOSIS — Z90.49 ACQUIRED ABSENCE OF OTHER SPECIFIED PARTS OF DIGESTIVE TRACT: Chronic | ICD-10-CM

## 2022-08-12 DIAGNOSIS — Z20.828 CONTACT WITH AND (SUSPECTED) EXPOSURE TO OTHER VIRAL COMMUNICABLE DISEASES: ICD-10-CM

## 2022-08-12 LAB — SARS-COV-2 RNA SPEC QL NAA+PROBE: SIGNIFICANT CHANGE UP

## 2022-08-12 PROCEDURE — U0003: CPT

## 2022-08-12 PROCEDURE — U0005: CPT

## 2022-08-15 ENCOUNTER — OUTPATIENT (OUTPATIENT)
Dept: OUTPATIENT SERVICES | Facility: HOSPITAL | Age: 67
LOS: 1 days | End: 2022-08-15
Payer: COMMERCIAL

## 2022-08-15 DIAGNOSIS — M54.16 RADICULOPATHY, LUMBAR REGION: ICD-10-CM

## 2022-08-15 DIAGNOSIS — G56.03 CARPAL TUNNEL SYNDROME, BILATERAL UPPER LIMBS: Chronic | ICD-10-CM

## 2022-08-15 DIAGNOSIS — Z98.890 OTHER SPECIFIED POSTPROCEDURAL STATES: Chronic | ICD-10-CM

## 2022-08-15 DIAGNOSIS — Z90.49 ACQUIRED ABSENCE OF OTHER SPECIFIED PARTS OF DIGESTIVE TRACT: Chronic | ICD-10-CM

## 2022-08-15 PROCEDURE — 62323 NJX INTERLAMINAR LMBR/SAC: CPT

## 2022-12-07 VITALS — HEIGHT: 62 IN | WEIGHT: 160 LBS | BODY MASS INDEX: 29.44 KG/M2

## 2022-12-22 ENCOUNTER — NON-APPOINTMENT (OUTPATIENT)
Age: 67
End: 2022-12-22

## 2022-12-22 DIAGNOSIS — Z87.39 PERSONAL HISTORY OF OTHER DISEASES OF THE MUSCULOSKELETAL SYSTEM AND CONNECTIVE TISSUE: ICD-10-CM

## 2022-12-22 DIAGNOSIS — G89.29 OTHER CHRONIC PAIN: ICD-10-CM

## 2022-12-22 DIAGNOSIS — Z79.891 LONG TERM (CURRENT) USE OF OPIATE ANALGESIC: ICD-10-CM

## 2022-12-22 DIAGNOSIS — M79.659 PAIN IN UNSPECIFIED THIGH: ICD-10-CM

## 2022-12-22 DIAGNOSIS — G89.4 CHRONIC PAIN SYNDROME: ICD-10-CM

## 2022-12-22 DIAGNOSIS — L73.9 FOLLICULAR DISORDER, UNSPECIFIED: ICD-10-CM

## 2022-12-22 DIAGNOSIS — Z87.19 PERSONAL HISTORY OF OTHER DISEASES OF THE DIGESTIVE SYSTEM: ICD-10-CM

## 2022-12-22 DIAGNOSIS — M25.552 PAIN IN LEFT HIP: ICD-10-CM

## 2022-12-22 RX ORDER — VITAMIN B COMPLEX
CAPSULE ORAL
Refills: 0 | Status: ACTIVE | COMMUNITY

## 2022-12-22 RX ORDER — ASPIRIN 81 MG/1
81 TABLET, CHEWABLE ORAL
Refills: 0 | Status: ACTIVE | COMMUNITY

## 2022-12-22 RX ORDER — METOPROLOL SUCCINATE 50 MG/1
50 TABLET, EXTENDED RELEASE ORAL
Refills: 0 | Status: ACTIVE | COMMUNITY

## 2023-01-04 ENCOUNTER — APPOINTMENT (OUTPATIENT)
Dept: PAIN MANAGEMENT | Facility: CLINIC | Age: 68
End: 2023-01-04
Payer: OTHER MISCELLANEOUS

## 2023-01-04 VITALS — HEIGHT: 62 IN | BODY MASS INDEX: 29.44 KG/M2 | WEIGHT: 160 LBS

## 2023-01-04 PROCEDURE — 99072 ADDL SUPL MATRL&STAF TM PHE: CPT

## 2023-01-04 PROCEDURE — 99213 OFFICE O/P EST LOW 20 MIN: CPT

## 2023-01-04 NOTE — HISTORY OF PRESENT ILLNESS
[Lower back] : lower back [Work related] : work related [6] : 6 [1] : 2 [Burning] : burning [Diffuse] : diffuse [Dull/Aching] : dull/aching [Radiating] : radiating [Sharp] : sharp [Shooting] : shooting [Stabbing] : stabbing [] : yes [Constant] : constant [Household chores] : household chores [Leisure] : leisure [Sleep] : sleep [Meds] : meds [Sitting] : sitting [Standing] : standing [Walking] : walking [Not working due to injury] : Work status: not working due to injury [FreeTextEntry3] : 2/13/13 [FreeTextEntry7] : b/l legs

## 2023-01-04 NOTE — ASSESSMENT
[FreeTextEntry1] : Interim history\par The patient is tolerating their medications without problems. There has no new pains, injuries, or complaints and no new issues. The use of medications appears appropriate and there are no aberrant behaviors noted. Side effects to current medications are denied. Average pain score for the month is 7 out of ten. The patient's current medications are documented to the best of their ability. THe  was obtained and reviewed prior to the visit, and any discrepancies were discussed with the patient.\par Objective information\par Since the last visit there are no additional radiologic studies, labs, or pain complaints. There are no changes in the patient's physical status.\par Plan\par The patient was given refill of their medication at their current level and will return to the office as needed for follow-up. The patient is showing no aberrant behavior or evidence of diversion. Opioid contract and opioid risk assessment on chart.  reviewed and any discrepancy discussed with patent. Applicable urine toxicology reviewed and recorded in the patient's electronic record. Urine toxicology is ordered for patient per office protocol or patient's risk assessment.\par

## 2023-02-07 ENCOUNTER — APPOINTMENT (OUTPATIENT)
Dept: PAIN MANAGEMENT | Facility: CLINIC | Age: 68
End: 2023-02-07
Payer: OTHER MISCELLANEOUS

## 2023-02-07 VITALS — BODY MASS INDEX: 30.36 KG/M2 | WEIGHT: 165 LBS | HEIGHT: 62 IN

## 2023-02-07 PROCEDURE — 99213 OFFICE O/P EST LOW 20 MIN: CPT

## 2023-02-07 PROCEDURE — 99072 ADDL SUPL MATRL&STAF TM PHE: CPT

## 2023-02-07 NOTE — HISTORY OF PRESENT ILLNESS
[Lower back] : lower back [Work related] : work related [7] : 7 [Dull/Aching] : dull/aching [Constant] : constant [Household chores] : household chores [Sleep] : sleep [Rest] : rest [Heat] : heat [Disabled] : Work status: disabled [] : Post Surgical Visit: no [FreeTextEntry3] : 02/13/2013 [FreeTextEntry5] : SLIP ON BLACK ICE TRIP AND FALL  [FreeTextEntry6] : AT TIME: SHARP  [FreeTextEntry7] : AT TIME DOWN BT LEG  [de-identified] : ALL MOBILITY ACTIVITY

## 2023-03-08 ENCOUNTER — APPOINTMENT (OUTPATIENT)
Dept: PAIN MANAGEMENT | Facility: CLINIC | Age: 68
End: 2023-03-08
Payer: OTHER MISCELLANEOUS

## 2023-03-08 VITALS — BODY MASS INDEX: 30.36 KG/M2 | HEIGHT: 62 IN | WEIGHT: 165 LBS

## 2023-03-08 PROCEDURE — 99213 OFFICE O/P EST LOW 20 MIN: CPT

## 2023-03-08 NOTE — HISTORY OF PRESENT ILLNESS
[Lower back] : lower back [Work related] : work related [7] : 7 [Dull/Aching] : dull/aching [Constant] : constant [Household chores] : household chores [Sleep] : sleep [Rest] : rest [Heat] : heat [Disabled] : Work status: disabled [] : Post Surgical Visit: no [FreeTextEntry3] : 02/13/2013 [FreeTextEntry5] : SLIP ON BLACK ICE TRIP AND FALL  [FreeTextEntry6] : AT TIME: SHARP  [FreeTextEntry7] : AT TIME DOWN BT LEG  [de-identified] : ALL MOBILITY ACTIVITY

## 2023-03-19 ENCOUNTER — RX RENEWAL (OUTPATIENT)
Age: 68
End: 2023-03-19

## 2023-04-05 ENCOUNTER — APPOINTMENT (OUTPATIENT)
Dept: PAIN MANAGEMENT | Facility: CLINIC | Age: 68
End: 2023-04-05
Payer: OTHER MISCELLANEOUS

## 2023-04-05 PROCEDURE — 99213 OFFICE O/P EST LOW 20 MIN: CPT

## 2023-04-05 NOTE — HISTORY OF PRESENT ILLNESS
[Lower back] : lower back [Work related] : work related [7] : 7 [Dull/Aching] : dull/aching [Constant] : constant [Household chores] : household chores [Sleep] : sleep [Rest] : rest [Heat] : heat [Disabled] : Work status: disabled [de-identified] : 04/05/2023- PATIENT STATES HAS DONE PHYSICAL THERAPY 5YRS AGO FOR 3[facility] AS PER PT 5X A WEEK  AND REPORT'S WORSEN  IMPROVEMENT WITH PAIN.\par PT STATES CURRNETLY DO HOME STRETCHING PROGRAM AT HOME 5X A WEEK  AND REPORT'S MILD IMPROVEMENT WITH PAIN.  \par  [] : Post Surgical Visit: no [FreeTextEntry3] : 02/13/2013 [FreeTextEntry5] : SLIP ON BLACK ICE TRIP AND FALL  [FreeTextEntry6] : AT TIME: SHARP  [FreeTextEntry7] : AT TIME DOWN BT LEG  [de-identified] : ALL MOBILITY ACTIVITY  [de-identified] : +5

## 2023-05-03 ENCOUNTER — APPOINTMENT (OUTPATIENT)
Dept: PAIN MANAGEMENT | Facility: CLINIC | Age: 68
End: 2023-05-03
Payer: OTHER MISCELLANEOUS

## 2023-05-03 VITALS — WEIGHT: 165 LBS | BODY MASS INDEX: 30.36 KG/M2 | HEIGHT: 62 IN

## 2023-05-03 PROCEDURE — 99213 OFFICE O/P EST LOW 20 MIN: CPT

## 2023-05-03 NOTE — REASON FOR VISIT
78
[FreeTextEntry2] : SG CASE DOA 02/13/2013 IS BEING SEEN FOR A FOLLOW-UP IN OFFICE  PAIN MANAGEMENT VISIT FOR MED REFFIL LS
no dysuria, no frequency, and no hematuria.

## 2023-05-03 NOTE — HISTORY OF PRESENT ILLNESS
[Lower back] : lower back [Work related] : work related [7] : 7 [Dull/Aching] : dull/aching [Constant] : constant [Household chores] : household chores [Sleep] : sleep [Rest] : rest [Heat] : heat [Disabled] : Work status: disabled [Sharp] : sharp [de-identified] : 04/05/2023- PATIENT STATES HAS DONE PHYSICAL THERAPY 5YRS AGO FOR [facility] AS PER PT 5X A WEEK  AND REPORT'S WORSEN  IMPROVEMENT WITH PAIN.\par PT STATES CURRENTLY DO HOME STRETCHING PROGRAM AT HOME 5X A WEEK  AND REPORT'S MILD IMPROVEMENT WITH PAIN.  \par  [] : Post Surgical Visit: no [FreeTextEntry3] : 02/13/2013 [FreeTextEntry5] : SLIP ON BLACK ICE TRIP AND FALL  [FreeTextEntry7] : AT TIME DOWN BT LEG  [de-identified] : ALL MOBILITY ACTIVITY  [de-identified] : +5

## 2023-06-02 ENCOUNTER — APPOINTMENT (OUTPATIENT)
Dept: PAIN MANAGEMENT | Facility: CLINIC | Age: 68
End: 2023-06-02
Payer: OTHER MISCELLANEOUS

## 2023-06-02 VITALS — WEIGHT: 165 LBS | HEIGHT: 62 IN | BODY MASS INDEX: 30.36 KG/M2

## 2023-06-02 PROCEDURE — 99213 OFFICE O/P EST LOW 20 MIN: CPT

## 2023-06-02 NOTE — HISTORY OF PRESENT ILLNESS
[Lower back] : lower back [Work related] : work related [7] : 7 [Dull/Aching] : dull/aching [Sharp] : sharp [Constant] : constant [Household chores] : household chores [Sleep] : sleep [Rest] : rest [Heat] : heat [Disabled] : Work status: disabled [] : Post Surgical Visit: no [FreeTextEntry3] : 02/13/2013 [FreeTextEntry5] : SLIP ON BLACK ICE TRIP AND FALL  [FreeTextEntry7] : AT TIME DOWN BT LEG  [de-identified] : ALL MOBILITY ACTIVITY  [de-identified] : +5 [de-identified] : 04/05/2023- PATIENT STATES HAS DONE PHYSICAL THERAPY 5YRS AGO FOR [facility] AS PER PT 5X A WEEK  AND REPORT'S WORSEN  IMPROVEMENT WITH PAIN.\par PT STATES CURRENTLY DO HOME STRETCHING PROGRAM AT HOME 5X A WEEK  AND REPORT'S MILD IMPROVEMENT WITH PAIN.  \par

## 2023-07-03 ENCOUNTER — APPOINTMENT (OUTPATIENT)
Dept: PAIN MANAGEMENT | Facility: CLINIC | Age: 68
End: 2023-07-03
Payer: OTHER MISCELLANEOUS

## 2023-07-03 ENCOUNTER — RX RENEWAL (OUTPATIENT)
Age: 68
End: 2023-07-03

## 2023-07-03 VITALS — HEIGHT: 62 IN | WEIGHT: 165 LBS | BODY MASS INDEX: 30.36 KG/M2

## 2023-07-03 PROCEDURE — 99213 OFFICE O/P EST LOW 20 MIN: CPT

## 2023-07-03 NOTE — HISTORY OF PRESENT ILLNESS
[Lower back] : lower back [Work related] : work related [7] : 7 [Dull/Aching] : dull/aching [Sharp] : sharp [Constant] : constant [Household chores] : household chores [Sleep] : sleep [Rest] : rest [Heat] : heat [Disabled] : Work status: disabled [] : Post Surgical Visit: no [FreeTextEntry3] : 02/13/2013 [FreeTextEntry5] : SLIP ON BLACK ICE TRIP AND FALL  [FreeTextEntry7] : AT TIME DOWN BT LEG  [de-identified] : ALL MOBILITY ACTIVITY  [de-identified] : +5

## 2023-08-11 ENCOUNTER — APPOINTMENT (OUTPATIENT)
Dept: PAIN MANAGEMENT | Facility: CLINIC | Age: 68
End: 2023-08-11
Payer: OTHER MISCELLANEOUS

## 2023-08-11 VITALS — WEIGHT: 165 LBS | BODY MASS INDEX: 30.36 KG/M2 | HEIGHT: 62 IN

## 2023-08-11 PROCEDURE — 99213 OFFICE O/P EST LOW 20 MIN: CPT

## 2023-08-11 RX ORDER — NALOXONE HYDROCHLORIDE 4 MG/.1ML
4 SPRAY NASAL
Qty: 1 | Refills: 0 | Status: ACTIVE | COMMUNITY
Start: 2023-08-11 | End: 1900-01-01

## 2023-08-11 NOTE — ASSESSMENT
[FreeTextEntry1] : Interim history The patient is tolerating their medications without problems. There has no new pains, injuries, or complaints and no new issues. The use of medications appears appropriate and there are no aberrant behaviors noted. Side effects to current medications are denied. Average pain score for the month is 7 out of ten. The patient's current medications are documented to the best of their ability. THe  was obtained and reviewed prior to the visit, and any discrepancies were discussed with the patient. Objective information Since the last visit there are no additional radiologic studies, labs, or pain complaints. There are no changes in the patient's physical status. Plan The patient was given refill of their medication at their current level and will return to the office as needed for follow-up. The patient is showing no aberrant behavior or evidence of diversion. Opioid contract and opioid risk assessment on chart.  reviewed and any discrepancy discussed with patent. Applicable urine toxicology reviewed and recorded in the patient's electronic record. Urine toxicology is ordered for patient per office protocol or patient's risk assessment.  Patient will follow-up in 1 month unless new issues arise the patient has returned earlier.

## 2023-08-11 NOTE — HISTORY OF PRESENT ILLNESS
[Lower back] : lower back [Work related] : work related [7] : 7 [Dull/Aching] : dull/aching [Sharp] : sharp [Constant] : constant [Household chores] : household chores [Sleep] : sleep [Rest] : rest [Heat] : heat [Disabled] : Work status: disabled [] : Post Surgical Visit: no [FreeTextEntry3] : 02/13/2013 [FreeTextEntry7] : AT TIME DOWN BT LEG  [FreeTextEntry5] : SLIP ON BLACK ICE TRIP AND FALL  [de-identified] : ALL MOBILITY ACTIVITY  [de-identified] : +5

## 2023-09-08 ENCOUNTER — APPOINTMENT (OUTPATIENT)
Dept: PAIN MANAGEMENT | Facility: CLINIC | Age: 68
End: 2023-09-08
Payer: OTHER MISCELLANEOUS

## 2023-09-08 PROCEDURE — 99213 OFFICE O/P EST LOW 20 MIN: CPT

## 2023-09-08 RX ORDER — TIZANIDINE 4 MG/1
4 TABLET ORAL
Qty: 90 | Refills: 5 | Status: ACTIVE | COMMUNITY
Start: 1900-01-01 | End: 1900-01-01

## 2023-09-08 NOTE — HISTORY OF PRESENT ILLNESS
[Lower back] : lower back [Work related] : work related [7] : 7 [Dull/Aching] : dull/aching [Sharp] : sharp [Constant] : constant [Household chores] : household chores [Sleep] : sleep [Rest] : rest [Heat] : heat [Disabled] : Work status: disabled [] : Post Surgical Visit: no [FreeTextEntry3] : 02/13/2013 [FreeTextEntry5] : SLIP ON BLACK ICE TRIP AND FALL  [FreeTextEntry7] : AT TIME DOWN BT LEG  [de-identified] : ALL MOBILITY ACTIVITY  [de-identified] : +5

## 2023-09-19 NOTE — PATIENT PROFILE ADULT. - NS TRANSFER PATIENT BELONGINGS
Received to room 3-1113 per wheelchair from L/D, instructed to call for assistance out of bed for her safety   Clothing/Cell Phone/PDA (specify)

## 2023-10-05 ENCOUNTER — RX RENEWAL (OUTPATIENT)
Age: 68
End: 2023-10-05

## 2023-10-05 RX ORDER — AMITRIPTYLINE HYDROCHLORIDE 100 MG/1
100 TABLET, FILM COATED ORAL
Qty: 90 | Refills: 1 | Status: ACTIVE | COMMUNITY
Start: 2023-07-03 | End: 1900-01-01

## 2023-10-06 ENCOUNTER — APPOINTMENT (OUTPATIENT)
Dept: PAIN MANAGEMENT | Facility: CLINIC | Age: 68
End: 2023-10-06
Payer: OTHER MISCELLANEOUS

## 2023-10-06 PROCEDURE — 99213 OFFICE O/P EST LOW 20 MIN: CPT

## 2023-11-03 ENCOUNTER — APPOINTMENT (OUTPATIENT)
Dept: PAIN MANAGEMENT | Facility: CLINIC | Age: 68
End: 2023-11-03
Payer: OTHER MISCELLANEOUS

## 2023-11-03 VITALS — WEIGHT: 165 LBS | HEIGHT: 62 IN | BODY MASS INDEX: 30.36 KG/M2

## 2023-11-03 PROCEDURE — 99213 OFFICE O/P EST LOW 20 MIN: CPT

## 2023-12-04 ENCOUNTER — APPOINTMENT (OUTPATIENT)
Dept: PAIN MANAGEMENT | Facility: CLINIC | Age: 68
End: 2023-12-04
Payer: OTHER MISCELLANEOUS

## 2023-12-04 DIAGNOSIS — M54.16 RADICULOPATHY, LUMBAR REGION: ICD-10-CM

## 2023-12-04 PROCEDURE — 99212 OFFICE O/P EST SF 10 MIN: CPT | Mod: 95

## 2023-12-04 PROCEDURE — 99213 OFFICE O/P EST LOW 20 MIN: CPT | Mod: 95

## 2023-12-04 RX ORDER — OXYCODONE HYDROCHLORIDE 15 MG/1
15 TABLET ORAL
Qty: 60 | Refills: 0 | Status: ACTIVE | COMMUNITY
Start: 1900-01-01 | End: 1900-01-01

## 2023-12-04 RX ORDER — PREGABALIN 75 MG/1
75 CAPSULE ORAL
Qty: 90 | Refills: 0 | Status: ACTIVE | COMMUNITY
Start: 1900-01-01 | End: 1900-01-01

## 2023-12-04 RX ORDER — MORPHINE SULFATE 30 MG/1
30 TABLET, FILM COATED, EXTENDED RELEASE ORAL
Qty: 60 | Refills: 0 | Status: ACTIVE | COMMUNITY
Start: 1900-01-01 | End: 1900-01-01

## 2024-01-03 ENCOUNTER — APPOINTMENT (OUTPATIENT)
Dept: PAIN MANAGEMENT | Facility: CLINIC | Age: 69
End: 2024-01-03

## 2024-04-18 NOTE — ED ADULT NURSE NOTE - NS ED NURSE IV DC DT
Dr. Enrique kamara with extending intermittent leave through 10/2024.  Sent request for updated forms to medical records.   01-Apr-2021 20:15

## 2024-10-07 NOTE — PATIENT PROFILE ADULT. - FALL HARM RISK TYPE OF ASSESSMENT
CT ED Program Initial Telephone Call Attempt    Discharge Date: 10/4/24  Discharge Location: [facility]: [facility]    Call Attempt Date: 10/7/2024  Call Attempt: [facility] Kiarra Sxeton RN  Care Transitions ED Program  [phone]   Working remote, M-F 8-4:30     Admission

## 2024-10-16 NOTE — ED STATDOCS - CHIEF COMPLAINT
What Is The Reason For Today's Visit?: Full Body Skin Examination
What Is The Reason For Today's Visit? (Being Monitored For X): the development of new lesions
The patient is a 65y year old Female complaining of shortness of breath.

## 2025-05-21 NOTE — ED PROVIDER NOTE - CONSTITUTIONAL [+], MLM
Large Joint Aspiration/Injection: L knee    Date/Time: 5/21/2025 7:00 PM    Performed by: Yodit June PA-C  Authorized by: Yodit June PA-C    Indications:  Arthritis and pain  Prep: patient was prepped and draped in usual sterile fashion      Local anesthesia used?: Yes    Local anesthetic:  Topical anesthetic    Details:  Needle Size:  22 G  Ultrasonic Guidance for needle placement?: No    Approach:  Lateral  Location:  Knee  Site:  L knee  Medications:  20 mg sodium hyaluronate (EUFLEXXA) 10 mg/mL(mw 2.4 -3.6 million)  Patient tolerance:  Patient tolerated the procedure well with no immediate complications  
myalgias/FEVER

## (undated) DEVICE — CATH IV SAFE 24GX3/4

## (undated) DEVICE — TRAY EPIDURAL SINGLE DOSE

## (undated) DEVICE — CATH IV SAFE BC BLU 22GAX1.0"

## (undated) DEVICE — NDL SPNL WHIT 22GX3.5IN

## (undated) DEVICE — GLV 7.5 ULTRAFREE MAX

## (undated) DEVICE — SOL INJ LR 500ML

## (undated) DEVICE — PACK IV START WITH CHG